# Patient Record
Sex: MALE | URBAN - METROPOLITAN AREA
[De-identification: names, ages, dates, MRNs, and addresses within clinical notes are randomized per-mention and may not be internally consistent; named-entity substitution may affect disease eponyms.]

---

## 2020-01-01 ENCOUNTER — CONFERENCE (OUTPATIENT)
Dept: TRANSPLANT | Facility: CLINIC | Age: 70
End: 2020-01-01

## 2020-01-01 ENCOUNTER — DOCUMENTATION ONLY (OUTPATIENT)
Dept: PHARMACY | Facility: HOSPITAL | Age: 70
End: 2020-01-01

## 2020-01-01 ENCOUNTER — HOSPITAL ENCOUNTER (INPATIENT)
Facility: HOSPITAL | Age: 70
LOS: 4 days | DRG: 871 | End: 2020-03-02
Attending: HOSPITALIST | Admitting: HOSPITALIST
Payer: MEDICARE

## 2020-01-01 ENCOUNTER — TELEPHONE (OUTPATIENT)
Dept: TRANSPLANT | Facility: CLINIC | Age: 70
End: 2020-01-01

## 2020-01-01 VITALS
DIASTOLIC BLOOD PRESSURE: 14 MMHG | WEIGHT: 201.81 LBS | OXYGEN SATURATION: 70 % | HEART RATE: 35 BPM | BODY MASS INDEX: 29.89 KG/M2 | RESPIRATION RATE: 7 BRPM | TEMPERATURE: 100 F | HEIGHT: 69 IN | SYSTOLIC BLOOD PRESSURE: 39 MMHG

## 2020-01-01 DIAGNOSIS — J96.01 ACUTE RESPIRATORY FAILURE WITH HYPOXIA: ICD-10-CM

## 2020-01-01 DIAGNOSIS — K74.60 CIRRHOSIS: ICD-10-CM

## 2020-01-01 DIAGNOSIS — K74.69 DECOMPENSATED HCV CIRRHOSIS: ICD-10-CM

## 2020-01-01 DIAGNOSIS — R00.1 BRADYCARDIA: ICD-10-CM

## 2020-01-01 DIAGNOSIS — B19.20 DECOMPENSATED HCV CIRRHOSIS: ICD-10-CM

## 2020-01-01 DIAGNOSIS — N17.9 ACUTE RENAL FAILURE: ICD-10-CM

## 2020-01-01 LAB
ABO + RH BLD: NORMAL
ADENOVIRUS: NOT DETECTED
AFP SERPL-MCNC: 0.8 NG/ML (ref 0–8.4)
ALBUMIN SERPL BCP-MCNC: 2.8 G/DL (ref 3.5–5.2)
ALBUMIN SERPL BCP-MCNC: 3.1 G/DL (ref 3.5–5.2)
ALBUMIN SERPL BCP-MCNC: 3.1 G/DL (ref 3.5–5.2)
ALBUMIN SERPL BCP-MCNC: 3.2 G/DL (ref 3.5–5.2)
ALBUMIN SERPL BCP-MCNC: 3.3 G/DL (ref 3.5–5.2)
ALBUMIN SERPL BCP-MCNC: 3.3 G/DL (ref 3.5–5.2)
ALBUMIN SERPL BCP-MCNC: 3.5 G/DL (ref 3.5–5.2)
ALBUMIN SERPL BCP-MCNC: 3.8 G/DL (ref 3.5–5.2)
ALBUMIN SERPL BCP-MCNC: 3.8 G/DL (ref 3.5–5.2)
ALBUMIN SERPL BCP-MCNC: 4 G/DL (ref 3.5–5.2)
ALBUMIN SERPL BCP-MCNC: 4.1 G/DL (ref 3.5–5.2)
ALBUMIN SERPL BCP-MCNC: 4.1 G/DL (ref 3.5–5.2)
ALLENS TEST: ABNORMAL
ALP SERPL-CCNC: 202 U/L (ref 55–135)
ALP SERPL-CCNC: 205 U/L (ref 55–135)
ALP SERPL-CCNC: 231 U/L (ref 55–135)
ALP SERPL-CCNC: 242 U/L (ref 55–135)
ALP SERPL-CCNC: 266 U/L (ref 55–135)
ALP SERPL-CCNC: 276 U/L (ref 55–135)
ALT SERPL W/O P-5'-P-CCNC: 114 U/L (ref 10–44)
ALT SERPL W/O P-5'-P-CCNC: 227 U/L (ref 10–44)
ALT SERPL W/O P-5'-P-CCNC: 26 U/L (ref 10–44)
ALT SERPL W/O P-5'-P-CCNC: 269 U/L (ref 10–44)
ALT SERPL W/O P-5'-P-CCNC: 28 U/L (ref 10–44)
ALT SERPL W/O P-5'-P-CCNC: 329 U/L (ref 10–44)
AMMONIA PLAS-SCNC: 38 UMOL/L (ref 10–50)
AMMONIA PLAS-SCNC: 57 UMOL/L (ref 10–50)
ANION GAP SERPL CALC-SCNC: 14 MMOL/L (ref 8–16)
ANION GAP SERPL CALC-SCNC: 14 MMOL/L (ref 8–16)
ANION GAP SERPL CALC-SCNC: 15 MMOL/L (ref 8–16)
ANION GAP SERPL CALC-SCNC: 16 MMOL/L (ref 8–16)
ANION GAP SERPL CALC-SCNC: 16 MMOL/L (ref 8–16)
ANION GAP SERPL CALC-SCNC: 17 MMOL/L (ref 8–16)
ANION GAP SERPL CALC-SCNC: 18 MMOL/L (ref 8–16)
ANISOCYTOSIS BLD QL SMEAR: ABNORMAL
ANISOCYTOSIS BLD QL SMEAR: ABNORMAL
ANISOCYTOSIS BLD QL SMEAR: SLIGHT
ANISOCYTOSIS BLD QL SMEAR: SLIGHT
ASCENDING AORTA: 3.13 CM
AST SERPL-CCNC: 417 U/L (ref 10–40)
AST SERPL-CCNC: 66 U/L (ref 10–40)
AST SERPL-CCNC: 73 U/L (ref 10–40)
AST SERPL-CCNC: 774 U/L (ref 10–40)
AST SERPL-CCNC: 786 U/L (ref 10–40)
AST SERPL-CCNC: 970 U/L (ref 10–40)
AV INDEX (PROSTH): 0.93
AV MEAN GRADIENT: 8 MMHG
AV PEAK GRADIENT: 17 MMHG
AV VALVE AREA: 3.27 CM2
AV VELOCITY RATIO: 0.84
BASO STIPL BLD QL SMEAR: ABNORMAL
BASOPHILS # BLD AUTO: 0.01 K/UL (ref 0–0.2)
BASOPHILS # BLD AUTO: 0.02 K/UL (ref 0–0.2)
BASOPHILS NFR BLD: 0.1 % (ref 0–1.9)
BILIRUB SERPL-MCNC: 13.5 MG/DL (ref 0.1–1)
BILIRUB SERPL-MCNC: 14.2 MG/DL (ref 0.1–1)
BILIRUB SERPL-MCNC: 17.9 MG/DL (ref 0.1–1)
BILIRUB SERPL-MCNC: 18.4 MG/DL (ref 0.1–1)
BILIRUB SERPL-MCNC: 20.4 MG/DL (ref 0.1–1)
BILIRUB SERPL-MCNC: 22.9 MG/DL (ref 0.1–1)
BLD GP AB SCN CELLS X3 SERPL QL: NORMAL
BNP SERPL-MCNC: 190 PG/ML (ref 0–99)
BORDETELLA PARAPERTUSSIS (IS1001): NOT DETECTED
BORDETELLA PERTUSSIS (PTXP): NOT DETECTED
BSA FOR ECHO PROCEDURE: 2.11 M2
BUN SERPL-MCNC: 162 MG/DL (ref 8–23)
BUN SERPL-MCNC: 17 MG/DL (ref 8–23)
BUN SERPL-MCNC: 172 MG/DL (ref 8–23)
BUN SERPL-MCNC: 173 MG/DL (ref 8–23)
BUN SERPL-MCNC: 21 MG/DL (ref 8–23)
BUN SERPL-MCNC: 21 MG/DL (ref 8–23)
BUN SERPL-MCNC: 4 MG/DL (ref 8–23)
BUN SERPL-MCNC: 5 MG/DL (ref 8–23)
BUN SERPL-MCNC: 6 MG/DL (ref 8–23)
BUN SERPL-MCNC: 77 MG/DL (ref 8–23)
BUN SERPL-MCNC: 8 MG/DL (ref 8–23)
BUN SERPL-MCNC: 89 MG/DL (ref 8–23)
BUN SERPL-MCNC: 89 MG/DL (ref 8–23)
BURR CELLS BLD QL SMEAR: ABNORMAL
BURR CELLS BLD QL SMEAR: ABNORMAL
CALCIUM SERPL-MCNC: 10 MG/DL (ref 8.7–10.5)
CALCIUM SERPL-MCNC: 10.6 MG/DL (ref 8.7–10.5)
CALCIUM SERPL-MCNC: 8.4 MG/DL (ref 8.7–10.5)
CALCIUM SERPL-MCNC: 8.5 MG/DL (ref 8.7–10.5)
CALCIUM SERPL-MCNC: 8.5 MG/DL (ref 8.7–10.5)
CALCIUM SERPL-MCNC: 8.6 MG/DL (ref 8.7–10.5)
CALCIUM SERPL-MCNC: 8.7 MG/DL (ref 8.7–10.5)
CALCIUM SERPL-MCNC: 9.1 MG/DL (ref 8.7–10.5)
CALCIUM SERPL-MCNC: 9.5 MG/DL (ref 8.7–10.5)
CALCIUM SERPL-MCNC: 9.5 MG/DL (ref 8.7–10.5)
CALCIUM SERPL-MCNC: 9.8 MG/DL (ref 8.7–10.5)
CHLAMYDIA PNEUMONIAE: NOT DETECTED
CHLORIDE SERPL-SCNC: 100 MMOL/L (ref 95–110)
CHLORIDE SERPL-SCNC: 100 MMOL/L (ref 95–110)
CHLORIDE SERPL-SCNC: 101 MMOL/L (ref 95–110)
CHLORIDE SERPL-SCNC: 104 MMOL/L (ref 95–110)
CHLORIDE SERPL-SCNC: 105 MMOL/L (ref 95–110)
CHLORIDE SERPL-SCNC: 105 MMOL/L (ref 95–110)
CHLORIDE SERPL-SCNC: 106 MMOL/L (ref 95–110)
CHLORIDE SERPL-SCNC: 107 MMOL/L (ref 95–110)
CHLORIDE SERPL-SCNC: 98 MMOL/L (ref 95–110)
CHLORIDE SERPL-SCNC: 98 MMOL/L (ref 95–110)
CHLORIDE SERPL-SCNC: 99 MMOL/L (ref 95–110)
CMV IGG SERPL QL IA: REACTIVE
CO2 SERPL-SCNC: 16 MMOL/L (ref 23–29)
CO2 SERPL-SCNC: 16 MMOL/L (ref 23–29)
CO2 SERPL-SCNC: 17 MMOL/L (ref 23–29)
CO2 SERPL-SCNC: 17 MMOL/L (ref 23–29)
CO2 SERPL-SCNC: 18 MMOL/L (ref 23–29)
CO2 SERPL-SCNC: 19 MMOL/L (ref 23–29)
CO2 SERPL-SCNC: 20 MMOL/L (ref 23–29)
CO2 SERPL-SCNC: 20 MMOL/L (ref 23–29)
CO2 SERPL-SCNC: 21 MMOL/L (ref 23–29)
CO2 SERPL-SCNC: 22 MMOL/L (ref 23–29)
COMPLEXED PSA SERPL-MCNC: 0.15 NG/ML (ref 0–4)
CORONAVIRUS 229E, COMMON COLD VIRUS: NOT DETECTED
CORONAVIRUS HKU1, COMMON COLD VIRUS: NOT DETECTED
CORONAVIRUS NL63, COMMON COLD VIRUS: NOT DETECTED
CORONAVIRUS OC43, COMMON COLD VIRUS: NOT DETECTED
CREAT SERPL-MCNC: 0.5 MG/DL (ref 0.5–1.4)
CREAT SERPL-MCNC: 0.5 MG/DL (ref 0.5–1.4)
CREAT SERPL-MCNC: 0.6 MG/DL (ref 0.5–1.4)
CREAT SERPL-MCNC: 0.7 MG/DL (ref 0.5–1.4)
CREAT SERPL-MCNC: 0.8 MG/DL (ref 0.5–1.4)
CREAT SERPL-MCNC: 0.8 MG/DL (ref 0.5–1.4)
CREAT SERPL-MCNC: 1.4 MG/DL (ref 0.5–1.4)
CREAT SERPL-MCNC: 1.5 MG/DL (ref 0.5–1.4)
CREAT SERPL-MCNC: 1.5 MG/DL (ref 0.5–1.4)
CREAT SERPL-MCNC: 3.3 MG/DL (ref 0.5–1.4)
CREAT SERPL-MCNC: 3.3 MG/DL (ref 0.5–1.4)
CREAT SERPL-MCNC: 3.5 MG/DL (ref 0.5–1.4)
CREAT SERPL-MCNC: 5.4 MG/DL (ref 0.5–1.4)
CREAT SERPL-MCNC: 5.7 MG/DL (ref 0.5–1.4)
CREAT SERPL-MCNC: 5.7 MG/DL (ref 0.5–1.4)
CV ECHO LV RWT: 0.36 CM
DELSYS: ABNORMAL
DIFFERENTIAL METHOD: ABNORMAL
DOP CALC AO PEAK VEL: 2.04 M/S
DOP CALC AO VTI: 44.06 CM
DOP CALC LVOT AREA: 3.5 CM2
DOP CALC LVOT DIAMETER: 2.12 CM
DOP CALC LVOT PEAK VEL: 1.71 M/S
DOP CALC LVOT STROKE VOLUME: 144.12 CM3
DOP CALCLVOT PEAK VEL VTI: 40.85 CM
E WAVE DECELERATION TIME: 207.1 MSEC
E/A RATIO: 1.38
E/E' RATIO: 13.83 M/S
EBV VCA IGG SER QL IA: POSITIVE
ECHO LV POSTERIOR WALL: 0.8 CM (ref 0.6–1.1)
EOSINOPHIL # BLD AUTO: 0.1 K/UL (ref 0–0.5)
EOSINOPHIL # BLD AUTO: 0.4 K/UL (ref 0–0.5)
EOSINOPHIL NFR BLD: 0.3 % (ref 0–8)
EOSINOPHIL NFR BLD: 0.3 % (ref 0–8)
EOSINOPHIL NFR BLD: 0.4 % (ref 0–8)
EOSINOPHIL NFR BLD: 0.5 % (ref 0–8)
EOSINOPHIL NFR BLD: 0.8 % (ref 0–8)
EOSINOPHIL NFR BLD: 1 % (ref 0–8)
EOSINOPHIL NFR BLD: 2.7 % (ref 0–8)
ERYTHROCYTE [DISTWIDTH] IN BLOOD BY AUTOMATED COUNT: 22.8 % (ref 11.5–14.5)
ERYTHROCYTE [DISTWIDTH] IN BLOOD BY AUTOMATED COUNT: 23.1 % (ref 11.5–14.5)
ERYTHROCYTE [DISTWIDTH] IN BLOOD BY AUTOMATED COUNT: 23.7 % (ref 11.5–14.5)
ERYTHROCYTE [DISTWIDTH] IN BLOOD BY AUTOMATED COUNT: 23.8 % (ref 11.5–14.5)
ERYTHROCYTE [DISTWIDTH] IN BLOOD BY AUTOMATED COUNT: 24.9 % (ref 11.5–14.5)
ERYTHROCYTE [DISTWIDTH] IN BLOOD BY AUTOMATED COUNT: 25.7 % (ref 11.5–14.5)
ERYTHROCYTE [DISTWIDTH] IN BLOOD BY AUTOMATED COUNT: 26.7 % (ref 11.5–14.5)
ERYTHROCYTE [SEDIMENTATION RATE] IN BLOOD BY WESTERGREN METHOD: 16 MM/H
EST. GFR  (AFRICAN AMERICAN): 10.9 ML/MIN/1.73 M^2
EST. GFR  (AFRICAN AMERICAN): 10.9 ML/MIN/1.73 M^2
EST. GFR  (AFRICAN AMERICAN): 11.6 ML/MIN/1.73 M^2
EST. GFR  (AFRICAN AMERICAN): 19.6 ML/MIN/1.73 M^2
EST. GFR  (AFRICAN AMERICAN): 21 ML/MIN/1.73 M^2
EST. GFR  (AFRICAN AMERICAN): 21 ML/MIN/1.73 M^2
EST. GFR  (AFRICAN AMERICAN): 54.5 ML/MIN/1.73 M^2
EST. GFR  (AFRICAN AMERICAN): 54.5 ML/MIN/1.73 M^2
EST. GFR  (AFRICAN AMERICAN): 59.3 ML/MIN/1.73 M^2
EST. GFR  (AFRICAN AMERICAN): >60 ML/MIN/1.73 M^2
EST. GFR  (NON AFRICAN AMERICAN): 10 ML/MIN/1.73 M^2
EST. GFR  (NON AFRICAN AMERICAN): 16.9 ML/MIN/1.73 M^2
EST. GFR  (NON AFRICAN AMERICAN): 18.2 ML/MIN/1.73 M^2
EST. GFR  (NON AFRICAN AMERICAN): 18.2 ML/MIN/1.73 M^2
EST. GFR  (NON AFRICAN AMERICAN): 47.2 ML/MIN/1.73 M^2
EST. GFR  (NON AFRICAN AMERICAN): 47.2 ML/MIN/1.73 M^2
EST. GFR  (NON AFRICAN AMERICAN): 51.3 ML/MIN/1.73 M^2
EST. GFR  (NON AFRICAN AMERICAN): 9.4 ML/MIN/1.73 M^2
EST. GFR  (NON AFRICAN AMERICAN): 9.4 ML/MIN/1.73 M^2
EST. GFR  (NON AFRICAN AMERICAN): >60 ML/MIN/1.73 M^2
ESTIMATED AVG GLUCOSE: 123 MG/DL (ref 68–131)
FERRITIN SERPL-MCNC: 4865 NG/ML (ref 20–300)
FIBRINOGEN PPP-MCNC: 230 MG/DL (ref 182–366)
FIO2: 98
FLOW: 12
FLOW: 15
FLOW: 4
FLUBV RNA NPH QL NAA+NON-PROBE: NOT DETECTED
FRACTIONAL SHORTENING: 38 % (ref 28–44)
GAMMA INTERFERON BACKGROUND BLD IA-ACNC: 0.01 IU/ML
GIANT PLATELETS BLD QL SMEAR: PRESENT
GLUCOSE SERPL-MCNC: 100 MG/DL (ref 70–110)
GLUCOSE SERPL-MCNC: 107 MG/DL (ref 70–110)
GLUCOSE SERPL-MCNC: 107 MG/DL (ref 70–110)
GLUCOSE SERPL-MCNC: 109 MG/DL (ref 70–110)
GLUCOSE SERPL-MCNC: 118 MG/DL (ref 70–110)
GLUCOSE SERPL-MCNC: 121 MG/DL (ref 70–110)
GLUCOSE SERPL-MCNC: 49 MG/DL (ref 70–110)
GLUCOSE SERPL-MCNC: 49 MG/DL (ref 70–110)
GLUCOSE SERPL-MCNC: 72 MG/DL (ref 70–110)
GLUCOSE SERPL-MCNC: 76 MG/DL (ref 70–110)
GLUCOSE SERPL-MCNC: 76 MG/DL (ref 70–110)
GLUCOSE SERPL-MCNC: 82 MG/DL (ref 70–110)
GLUCOSE SERPL-MCNC: 89 MG/DL (ref 70–110)
GLUCOSE SERPL-MCNC: 94 MG/DL (ref 70–110)
GLUCOSE SERPL-MCNC: 95 MG/DL (ref 70–110)
HBA1C MFR BLD HPLC: 5.9 % (ref 4–5.6)
HBV CORE AB SERPL QL IA: NEGATIVE
HBV SURFACE AB SER-ACNC: NEGATIVE M[IU]/ML
HBV SURFACE AG SERPL QL IA: NEGATIVE
HCO3 UR-SCNC: 19.5 MMOL/L (ref 24–28)
HCO3 UR-SCNC: 20.2 MMOL/L (ref 24–28)
HCO3 UR-SCNC: 21.4 MMOL/L (ref 24–28)
HCO3 UR-SCNC: 21.4 MMOL/L (ref 24–28)
HCO3 UR-SCNC: 23.3 MMOL/L (ref 24–28)
HCT VFR BLD AUTO: 25.2 % (ref 40–54)
HCT VFR BLD AUTO: 25.4 % (ref 40–54)
HCT VFR BLD AUTO: 25.8 % (ref 40–54)
HCT VFR BLD AUTO: 26.6 % (ref 40–54)
HCT VFR BLD AUTO: 26.7 % (ref 40–54)
HCT VFR BLD AUTO: 26.9 % (ref 40–54)
HCT VFR BLD AUTO: 28.2 % (ref 40–54)
HCT VFR BLD CALC: 26 %PCV (ref 36–54)
HCV AB SERPL QL IA: NEGATIVE
HEPATITIS A ANTIBODY, IGG: POSITIVE
HGB BLD-MCNC: 7.8 G/DL (ref 14–18)
HGB BLD-MCNC: 7.8 G/DL (ref 14–18)
HGB BLD-MCNC: 8 G/DL (ref 14–18)
HGB BLD-MCNC: 8.1 G/DL (ref 14–18)
HGB BLD-MCNC: 8.8 G/DL (ref 14–18)
HIV 1+2 AB+HIV1 P24 AG SERPL QL IA: NEGATIVE
HPIV1 RNA NPH QL NAA+NON-PROBE: NOT DETECTED
HPIV2 RNA NPH QL NAA+NON-PROBE: NOT DETECTED
HPIV3 RNA NPH QL NAA+NON-PROBE: NOT DETECTED
HPIV4 RNA NPH QL NAA+NON-PROBE: NOT DETECTED
HUMAN METAPNEUMOVIRUS: NOT DETECTED
HYPOCHROMIA BLD QL SMEAR: ABNORMAL
IMM GRANULOCYTES # BLD AUTO: 0.07 K/UL (ref 0–0.04)
IMM GRANULOCYTES # BLD AUTO: 0.09 K/UL (ref 0–0.04)
IMM GRANULOCYTES # BLD AUTO: 0.11 K/UL (ref 0–0.04)
IMM GRANULOCYTES # BLD AUTO: 0.13 K/UL (ref 0–0.04)
IMM GRANULOCYTES # BLD AUTO: 0.13 K/UL (ref 0–0.04)
IMM GRANULOCYTES # BLD AUTO: 0.16 K/UL (ref 0–0.04)
IMM GRANULOCYTES # BLD AUTO: 0.21 K/UL (ref 0–0.04)
IMM GRANULOCYTES NFR BLD AUTO: 0.5 % (ref 0–0.5)
IMM GRANULOCYTES NFR BLD AUTO: 0.6 % (ref 0–0.5)
IMM GRANULOCYTES NFR BLD AUTO: 0.7 % (ref 0–0.5)
IMM GRANULOCYTES NFR BLD AUTO: 0.7 % (ref 0–0.5)
IMM GRANULOCYTES NFR BLD AUTO: 0.8 % (ref 0–0.5)
IMM GRANULOCYTES NFR BLD AUTO: 1 % (ref 0–0.5)
IMM GRANULOCYTES NFR BLD AUTO: 1.4 % (ref 0–0.5)
INFLUENZA A (SUBTYPES H1,H1-2009,H3): NOT DETECTED
INFLUENZA A, MOLECULAR: NEGATIVE
INFLUENZA B, MOLECULAR: NEGATIVE
INR PPP: 1.8 (ref 0.8–1.2)
INR PPP: 1.9 (ref 0.8–1.2)
INR PPP: 2.3 (ref 0.8–1.2)
INR PPP: 2.9 (ref 0.8–1.2)
INR PPP: 3.8 (ref 0.8–1.2)
INR PPP: 4.7 (ref 0.8–1.2)
INTERVENTRICULAR SEPTUM: 0.8 CM (ref 0.6–1.1)
IRON SERPL-MCNC: 77 UG/DL (ref 45–160)
LA MAJOR: 5.67 CM
LA MINOR: 5.6 CM
LA WIDTH: 3.65 CM
LACTATE SERPL-SCNC: 2.5 MMOL/L (ref 0.5–2.2)
LACTATE SERPL-SCNC: 3.5 MMOL/L (ref 0.5–2.2)
LACTATE SERPL-SCNC: 3.6 MMOL/L (ref 0.5–2.2)
LACTATE SERPL-SCNC: 3.6 MMOL/L (ref 0.5–2.2)
LACTATE SERPL-SCNC: 4.7 MMOL/L (ref 0.5–2.2)
LACTATE SERPL-SCNC: 5.3 MMOL/L (ref 0.5–2.2)
LACTATE SERPL-SCNC: 6.2 MMOL/L (ref 0.5–2.2)
LACTATE SERPL-SCNC: 8.2 MMOL/L (ref 0.5–2.2)
LACTATE SERPL-SCNC: 8.5 MMOL/L (ref 0.5–2.2)
LDH SERPL L TO P-CCNC: 727 U/L (ref 110–260)
LDH SERPL L TO P-CCNC: 801 U/L (ref 110–260)
LDH SERPL L TO P-CCNC: 986 U/L (ref 110–260)
LEFT ATRIUM SIZE: 4.1 CM
LEFT ATRIUM VOLUME INDEX: 34.6 ML/M2
LEFT ATRIUM VOLUME: 71.68 CM3
LEFT INTERNAL DIMENSION IN SYSTOLE: 2.77 CM (ref 2.1–4)
LEFT VENTRICLE DIASTOLIC VOLUME INDEX: 38.03 ML/M2
LEFT VENTRICLE DIASTOLIC VOLUME: 78.88 ML
LEFT VENTRICLE MASS INDEX: 55 G/M2
LEFT VENTRICLE SYSTOLIC VOLUME INDEX: 13.8 ML/M2
LEFT VENTRICLE SYSTOLIC VOLUME: 28.69 ML
LEFT VENTRICULAR INTERNAL DIMENSION IN DIASTOLE: 4.5 CM (ref 3.5–6)
LEFT VENTRICULAR MASS: 113.63 G
LV LATERAL E/E' RATIO: 13.83 M/S
LV SEPTAL E/E' RATIO: 13.83 M/S
LYMPHOCYTES # BLD AUTO: 1 K/UL (ref 1–4.8)
LYMPHOCYTES # BLD AUTO: 1.1 K/UL (ref 1–4.8)
LYMPHOCYTES # BLD AUTO: 1.2 K/UL (ref 1–4.8)
LYMPHOCYTES # BLD AUTO: 1.3 K/UL (ref 1–4.8)
LYMPHOCYTES # BLD AUTO: 1.4 K/UL (ref 1–4.8)
LYMPHOCYTES # BLD AUTO: 1.6 K/UL (ref 1–4.8)
LYMPHOCYTES # BLD AUTO: 2.3 K/UL (ref 1–4.8)
LYMPHOCYTES NFR BLD: 10.2 % (ref 18–48)
LYMPHOCYTES NFR BLD: 15.3 % (ref 18–48)
LYMPHOCYTES NFR BLD: 6.3 % (ref 18–48)
LYMPHOCYTES NFR BLD: 6.8 % (ref 18–48)
LYMPHOCYTES NFR BLD: 8 % (ref 18–48)
LYMPHOCYTES NFR BLD: 9.2 % (ref 18–48)
LYMPHOCYTES NFR BLD: 9.3 % (ref 18–48)
M TB IFN-G CD4+ BCKGRND COR BLD-ACNC: 0 IU/ML
MAGNESIUM SERPL-MCNC: 1.9 MG/DL (ref 1.6–2.6)
MAGNESIUM SERPL-MCNC: 1.9 MG/DL (ref 1.6–2.6)
MAGNESIUM SERPL-MCNC: 2 MG/DL (ref 1.6–2.6)
MAGNESIUM SERPL-MCNC: 2.1 MG/DL (ref 1.6–2.6)
MAGNESIUM SERPL-MCNC: 2.3 MG/DL (ref 1.6–2.6)
MAGNESIUM SERPL-MCNC: 2.6 MG/DL (ref 1.6–2.6)
MCH RBC QN AUTO: 31.9 PG (ref 27–31)
MCH RBC QN AUTO: 32.2 PG (ref 27–31)
MCH RBC QN AUTO: 32.3 PG (ref 27–31)
MCH RBC QN AUTO: 32.4 PG (ref 27–31)
MCH RBC QN AUTO: 32.6 PG (ref 27–31)
MCH RBC QN AUTO: 32.7 PG (ref 27–31)
MCH RBC QN AUTO: 33.1 PG (ref 27–31)
MCHC RBC AUTO-ENTMCNC: 29.2 G/DL (ref 32–36)
MCHC RBC AUTO-ENTMCNC: 30.1 G/DL (ref 32–36)
MCHC RBC AUTO-ENTMCNC: 30.1 G/DL (ref 32–36)
MCHC RBC AUTO-ENTMCNC: 30.2 G/DL (ref 32–36)
MCHC RBC AUTO-ENTMCNC: 31.2 G/DL (ref 32–36)
MCHC RBC AUTO-ENTMCNC: 31.5 G/DL (ref 32–36)
MCHC RBC AUTO-ENTMCNC: 31.7 G/DL (ref 32–36)
MCV RBC AUTO: 102 FL (ref 82–98)
MCV RBC AUTO: 104 FL (ref 82–98)
MCV RBC AUTO: 104 FL (ref 82–98)
MCV RBC AUTO: 106 FL (ref 82–98)
MCV RBC AUTO: 107 FL (ref 82–98)
MCV RBC AUTO: 110 FL (ref 82–98)
MCV RBC AUTO: 110 FL (ref 82–98)
MITOGEN IGNF BCKGRD COR BLD-ACNC: >10 IU/ML
MODE: ABNORMAL
MONOCYTES # BLD AUTO: 1 K/UL (ref 0.3–1)
MONOCYTES # BLD AUTO: 1.1 K/UL (ref 0.3–1)
MONOCYTES # BLD AUTO: 1.1 K/UL (ref 0.3–1)
MONOCYTES # BLD AUTO: 1.5 K/UL (ref 0.3–1)
MONOCYTES # BLD AUTO: 1.7 K/UL (ref 0.3–1)
MONOCYTES # BLD AUTO: 2 K/UL (ref 0.3–1)
MONOCYTES # BLD AUTO: 2 K/UL (ref 0.3–1)
MONOCYTES NFR BLD: 12.8 % (ref 4–15)
MONOCYTES NFR BLD: 13.4 % (ref 4–15)
MONOCYTES NFR BLD: 6.7 % (ref 4–15)
MONOCYTES NFR BLD: 8 % (ref 4–15)
MONOCYTES NFR BLD: 8 % (ref 4–15)
MONOCYTES NFR BLD: 8.8 % (ref 4–15)
MONOCYTES NFR BLD: 9.5 % (ref 4–15)
MV PEAK A VEL: 0.6 M/S
MV PEAK E VEL: 0.83 M/S
MYCOPLASMA PNEUMONIAE: NOT DETECTED
NEUTROPHILS # BLD AUTO: 10.1 K/UL (ref 1.8–7.7)
NEUTROPHILS # BLD AUTO: 10.3 K/UL (ref 1.8–7.7)
NEUTROPHILS # BLD AUTO: 11.6 K/UL (ref 1.8–7.7)
NEUTROPHILS # BLD AUTO: 11.7 K/UL (ref 1.8–7.7)
NEUTROPHILS # BLD AUTO: 13.6 K/UL (ref 1.8–7.7)
NEUTROPHILS # BLD AUTO: 14.2 K/UL (ref 1.8–7.7)
NEUTROPHILS # BLD AUTO: 15 K/UL (ref 1.8–7.7)
NEUTROPHILS NFR BLD: 67.1 % (ref 38–73)
NEUTROPHILS NFR BLD: 76 % (ref 38–73)
NEUTROPHILS NFR BLD: 80.2 % (ref 38–73)
NEUTROPHILS NFR BLD: 80.8 % (ref 38–73)
NEUTROPHILS NFR BLD: 82.6 % (ref 38–73)
NEUTROPHILS NFR BLD: 82.8 % (ref 38–73)
NEUTROPHILS NFR BLD: 85.8 % (ref 38–73)
NRBC BLD-RTO: 0 /100 WBC
NRBC BLD-RTO: 1 /100 WBC
NRBC BLD-RTO: 2 /100 WBC
OVALOCYTES BLD QL SMEAR: ABNORMAL
PCO2 BLDA: 28.3 MMHG (ref 35–45)
PCO2 BLDA: 31.2 MMHG (ref 35–45)
PCO2 BLDA: 40.2 MMHG (ref 35–45)
PCO2 BLDA: 42.3 MMHG (ref 35–45)
PCO2 BLDA: 54.8 MMHG (ref 35–45)
PH SMN: 7.24 [PH] (ref 7.35–7.45)
PH SMN: 7.31 [PH] (ref 7.35–7.45)
PH SMN: 7.33 [PH] (ref 7.35–7.45)
PH SMN: 7.42 [PH] (ref 7.35–7.45)
PH SMN: 7.45 [PH] (ref 7.35–7.45)
PHOSPHATE SERPL-MCNC: 1.8 MG/DL (ref 2.7–4.5)
PHOSPHATE SERPL-MCNC: 1.9 MG/DL (ref 2.7–4.5)
PHOSPHATE SERPL-MCNC: 1.9 MG/DL (ref 2.7–4.5)
PHOSPHATE SERPL-MCNC: 2 MG/DL (ref 2.7–4.5)
PHOSPHATE SERPL-MCNC: 2.2 MG/DL (ref 2.7–4.5)
PHOSPHATE SERPL-MCNC: 2.5 MG/DL (ref 2.7–4.5)
PHOSPHATE SERPL-MCNC: 3.1 MG/DL (ref 2.7–4.5)
PHOSPHATE SERPL-MCNC: 4.2 MG/DL (ref 2.7–4.5)
PHOSPHATE SERPL-MCNC: 4.2 MG/DL (ref 2.7–4.5)
PHOSPHATE SERPL-MCNC: 4.3 MG/DL (ref 2.7–4.5)
PHOSPHATE SERPL-MCNC: 4.3 MG/DL (ref 2.7–4.5)
PHOSPHATE SERPL-MCNC: 5 MG/DL (ref 2.7–4.5)
PHOSPHATE SERPL-MCNC: 8 MG/DL (ref 2.7–4.5)
PHOSPHATIDYLETHANOL (PETH): NEGATIVE NG/ML
PISA TR MAX VEL: 2.45 M/S
PLATELET # BLD AUTO: 30 K/UL (ref 150–350)
PLATELET # BLD AUTO: 31 K/UL (ref 150–350)
PLATELET # BLD AUTO: 39 K/UL (ref 150–350)
PLATELET # BLD AUTO: 68 K/UL (ref 150–350)
PLATELET # BLD AUTO: 71 K/UL (ref 150–350)
PLATELET # BLD AUTO: 73 K/UL (ref 150–350)
PLATELET # BLD AUTO: 74 K/UL (ref 150–350)
PLATELET BLD QL SMEAR: ABNORMAL
PMV BLD AUTO: 11.8 FL (ref 9.2–12.9)
PMV BLD AUTO: 11.9 FL (ref 9.2–12.9)
PMV BLD AUTO: 12.3 FL (ref 9.2–12.9)
PMV BLD AUTO: 12.5 FL (ref 9.2–12.9)
PMV BLD AUTO: 12.6 FL (ref 9.2–12.9)
PMV BLD AUTO: 12.6 FL (ref 9.2–12.9)
PMV BLD AUTO: 13.8 FL (ref 9.2–12.9)
PO2 BLDA: 34 MMHG (ref 40–60)
PO2 BLDA: 47 MMHG (ref 40–60)
PO2 BLDA: 59 MMHG (ref 80–100)
PO2 BLDA: 64 MMHG (ref 80–100)
PO2 BLDA: 66 MMHG (ref 80–100)
POC BE: -4 MMOL/L
POC BE: -5 MMOL/L
POC BE: -5 MMOL/L
POC IONIZED CALCIUM: 1.26 MMOL/L (ref 1.06–1.42)
POC SATURATED O2: 69 % (ref 95–100)
POC SATURATED O2: 74 % (ref 95–100)
POC SATURATED O2: 88 % (ref 95–100)
POC SATURATED O2: 91 % (ref 95–100)
POC SATURATED O2: 93 % (ref 95–100)
POC TCO2: 20 MMOL/L (ref 24–29)
POC TCO2: 21 MMOL/L (ref 23–27)
POC TCO2: 23 MMOL/L (ref 23–27)
POC TCO2: 23 MMOL/L (ref 23–27)
POC TCO2: 25 MMOL/L (ref 24–29)
POCT GLUCOSE: 104 MG/DL (ref 70–110)
POCT GLUCOSE: 106 MG/DL (ref 70–110)
POCT GLUCOSE: 107 MG/DL (ref 70–110)
POCT GLUCOSE: 108 MG/DL (ref 70–110)
POCT GLUCOSE: 114 MG/DL (ref 70–110)
POCT GLUCOSE: 115 MG/DL (ref 70–110)
POCT GLUCOSE: 117 MG/DL (ref 70–110)
POCT GLUCOSE: 131 MG/DL (ref 70–110)
POCT GLUCOSE: 149 MG/DL (ref 70–110)
POCT GLUCOSE: 41 MG/DL (ref 70–110)
POCT GLUCOSE: 52 MG/DL (ref 70–110)
POCT GLUCOSE: 89 MG/DL (ref 70–110)
POCT GLUCOSE: 91 MG/DL (ref 70–110)
POCT GLUCOSE: 95 MG/DL (ref 70–110)
POCT GLUCOSE: 96 MG/DL (ref 70–110)
POIKILOCYTOSIS BLD QL SMEAR: SLIGHT
POLYCHROMASIA BLD QL SMEAR: ABNORMAL
POTASSIUM BLD-SCNC: 4.6 MMOL/L (ref 3.5–5.1)
POTASSIUM SERPL-SCNC: 4.4 MMOL/L (ref 3.5–5.1)
POTASSIUM SERPL-SCNC: 4.4 MMOL/L (ref 3.5–5.1)
POTASSIUM SERPL-SCNC: 4.5 MMOL/L (ref 3.5–5.1)
POTASSIUM SERPL-SCNC: 4.5 MMOL/L (ref 3.5–5.1)
POTASSIUM SERPL-SCNC: 4.6 MMOL/L (ref 3.5–5.1)
POTASSIUM SERPL-SCNC: 4.7 MMOL/L (ref 3.5–5.1)
POTASSIUM SERPL-SCNC: 4.8 MMOL/L (ref 3.5–5.1)
POTASSIUM SERPL-SCNC: 4.8 MMOL/L (ref 3.5–5.1)
POTASSIUM SERPL-SCNC: 4.9 MMOL/L (ref 3.5–5.1)
POTASSIUM SERPL-SCNC: 5 MMOL/L (ref 3.5–5.1)
PREALB SERPL-MCNC: 7 MG/DL (ref 20–43)
PROT SERPL-MCNC: 5.3 G/DL (ref 6–8.4)
PROT SERPL-MCNC: 5.5 G/DL (ref 6–8.4)
PROT SERPL-MCNC: 5.8 G/DL (ref 6–8.4)
PROT SERPL-MCNC: 6.2 G/DL (ref 6–8.4)
PROT SERPL-MCNC: 6.2 G/DL (ref 6–8.4)
PROT SERPL-MCNC: 6.8 G/DL (ref 6–8.4)
PROTHROMBIN TIME: 17 SEC (ref 9–12.5)
PROTHROMBIN TIME: 18 SEC (ref 9–12.5)
PROTHROMBIN TIME: 21.9 SEC (ref 9–12.5)
PROTHROMBIN TIME: 27.6 SEC (ref 9–12.5)
PROTHROMBIN TIME: 35.5 SEC (ref 9–12.5)
PROTHROMBIN TIME: 45.1 SEC (ref 9–12.5)
PROVIDER CREDENTIALS: ABNORMAL
PROVIDER NOTIFIED: ABNORMAL
PULM VEIN S/D RATIO: 1.27
PV PEAK D VEL: 0.63 M/S
PV PEAK S VEL: 0.8 M/S
RA MAJOR: 5.85 CM
RA WIDTH: 3.71 CM
RBC # BLD AUTO: 2.41 M/UL (ref 4.6–6.2)
RBC # BLD AUTO: 2.42 M/UL (ref 4.6–6.2)
RBC # BLD AUTO: 2.45 M/UL (ref 4.6–6.2)
RBC # BLD AUTO: 2.45 M/UL (ref 4.6–6.2)
RBC # BLD AUTO: 2.48 M/UL (ref 4.6–6.2)
RBC # BLD AUTO: 2.51 M/UL (ref 4.6–6.2)
RBC # BLD AUTO: 2.7 M/UL (ref 4.6–6.2)
RESPIRATORY INFECTION PANEL SOURCE: NORMAL
RIGHT VENTRICULAR END-DIASTOLIC DIMENSION: 4.04 CM
RPR SER QL: NORMAL
RSV RNA NPH QL NAA+NON-PROBE: NOT DETECTED
RV+EV RNA NPH QL NAA+NON-PROBE: NOT DETECTED
SAMPLE: ABNORMAL
SATURATED IRON: 52 % (ref 20–50)
SCHISTOCYTES BLD QL SMEAR: ABNORMAL
SINUS: 3.57 CM
SITE: ABNORMAL
SODIUM BLD-SCNC: 135 MMOL/L (ref 136–145)
SODIUM SERPL-SCNC: 135 MMOL/L (ref 136–145)
SODIUM SERPL-SCNC: 135 MMOL/L (ref 136–145)
SODIUM SERPL-SCNC: 136 MMOL/L (ref 136–145)
SODIUM SERPL-SCNC: 137 MMOL/L (ref 136–145)
SODIUM SERPL-SCNC: 138 MMOL/L (ref 136–145)
SODIUM SERPL-SCNC: 139 MMOL/L (ref 136–145)
SODIUM SERPL-SCNC: 140 MMOL/L (ref 136–145)
SP02: 95
SP02: 96
SPECIMEN SOURCE: NORMAL
STJ: 2.75 CM
STRONGYLOIDES ANTIBODY IGG: NEGATIVE
T4 FREE SERPL-MCNC: 0.95 NG/DL (ref 0.71–1.51)
TARGETS BLD QL SMEAR: ABNORMAL
TARGETS BLD QL SMEAR: ABNORMAL
TB GOLD PLUS: NEGATIVE
TB2 - NIL: 0 IU/ML
TDI LATERAL: 0.06 M/S
TDI SEPTAL: 0.06 M/S
TDI: 0.06 M/S
TIME NOTIFIED: 905
TOTAL IRON BINDING CAPACITY: 148 UG/DL (ref 250–450)
TOXIC GRANULES BLD QL SMEAR: PRESENT
TR MAX PG: 24 MMHG
TRANSFERRIN SERPL-MCNC: 100 MG/DL (ref 200–375)
TRICUSPID ANNULAR PLANE SYSTOLIC EXCURSION: 1.12 CM
TROPONIN I SERPL DL<=0.01 NG/ML-MCNC: 0.25 NG/ML (ref 0–0.03)
TROPONIN I SERPL DL<=0.01 NG/ML-MCNC: 0.27 NG/ML (ref 0–0.03)
TROPONIN I SERPL DL<=0.01 NG/ML-MCNC: 0.31 NG/ML (ref 0–0.03)
TSH SERPL DL<=0.005 MIU/L-ACNC: 0.03 UIU/ML (ref 0.4–4)
VANCOMYCIN SERPL-MCNC: 12.5 UG/ML
VANCOMYCIN SERPL-MCNC: 15.7 UG/ML
VANCOMYCIN SERPL-MCNC: 19.2 UG/ML
VARICELLA INTERPRETATION: POSITIVE
VARICELLA ZOSTER IGG: 3.43 ISR (ref 0–0.9)
VERBAL RESULT READBACK PERFORMED: YES
WBC # BLD AUTO: 12.8 K/UL (ref 3.9–12.7)
WBC # BLD AUTO: 13.99 K/UL (ref 3.9–12.7)
WBC # BLD AUTO: 15.13 K/UL (ref 3.9–12.7)
WBC # BLD AUTO: 15.43 K/UL (ref 3.9–12.7)
WBC # BLD AUTO: 16.5 K/UL (ref 3.9–12.7)
WBC # BLD AUTO: 16.86 K/UL (ref 3.9–12.7)
WBC # BLD AUTO: 18.2 K/UL (ref 3.9–12.7)

## 2020-01-01 PROCEDURE — 99900035 HC TECH TIME PER 15 MIN (STAT)

## 2020-01-01 PROCEDURE — 85610 PROTHROMBIN TIME: CPT

## 2020-01-01 PROCEDURE — 63600175 PHARM REV CODE 636 W HCPCS: Performed by: STUDENT IN AN ORGANIZED HEALTH CARE EDUCATION/TRAINING PROGRAM

## 2020-01-01 PROCEDURE — 99291 PR CRITICAL CARE, E/M 30-74 MINUTES: ICD-10-PCS | Mod: GC,,, | Performed by: INTERNAL MEDICINE

## 2020-01-01 PROCEDURE — 99223 1ST HOSP IP/OBS HIGH 75: CPT | Mod: GC,,, | Performed by: INTERNAL MEDICINE

## 2020-01-01 PROCEDURE — 87040 BLOOD CULTURE FOR BACTERIA: CPT | Mod: 59

## 2020-01-01 PROCEDURE — 25000003 PHARM REV CODE 250: Performed by: STUDENT IN AN ORGANIZED HEALTH CARE EDUCATION/TRAINING PROGRAM

## 2020-01-01 PROCEDURE — 27000221 HC OXYGEN, UP TO 24 HOURS

## 2020-01-01 PROCEDURE — 83036 HEMOGLOBIN GLYCOSYLATED A1C: CPT

## 2020-01-01 PROCEDURE — 25000242 PHARM REV CODE 250 ALT 637 W/ HCPCS: Performed by: HOSPITALIST

## 2020-01-01 PROCEDURE — 82140 ASSAY OF AMMONIA: CPT

## 2020-01-01 PROCEDURE — 84484 ASSAY OF TROPONIN QUANT: CPT | Mod: 91

## 2020-01-01 PROCEDURE — 25000003 PHARM REV CODE 250: Performed by: HOSPITALIST

## 2020-01-01 PROCEDURE — C1751 CATH, INF, PER/CENT/MIDLINE: HCPCS

## 2020-01-01 PROCEDURE — 83615 LACTATE (LD) (LDH) ENZYME: CPT

## 2020-01-01 PROCEDURE — 51701 INSERT BLADDER CATHETER: CPT

## 2020-01-01 PROCEDURE — C9113 INJ PANTOPRAZOLE SODIUM, VIA: HCPCS | Performed by: STUDENT IN AN ORGANIZED HEALTH CARE EDUCATION/TRAINING PROGRAM

## 2020-01-01 PROCEDURE — 99232 PR SUBSEQUENT HOSPITAL CARE,LEVL II: ICD-10-PCS | Mod: GC,,, | Performed by: INTERNAL MEDICINE

## 2020-01-01 PROCEDURE — 86682 HELMINTH ANTIBODY: CPT

## 2020-01-01 PROCEDURE — 83735 ASSAY OF MAGNESIUM: CPT | Mod: 91

## 2020-01-01 PROCEDURE — 80202 ASSAY OF VANCOMYCIN: CPT

## 2020-01-01 PROCEDURE — 51702 INSERT TEMP BLADDER CATH: CPT

## 2020-01-01 PROCEDURE — 85384 FIBRINOGEN ACTIVITY: CPT

## 2020-01-01 PROCEDURE — 82803 BLOOD GASES ANY COMBINATION: CPT

## 2020-01-01 PROCEDURE — 25000003 PHARM REV CODE 250: Performed by: GENERAL PRACTICE

## 2020-01-01 PROCEDURE — 25000003 PHARM REV CODE 250

## 2020-01-01 PROCEDURE — 25000242 PHARM REV CODE 250 ALT 637 W/ HCPCS: Performed by: STUDENT IN AN ORGANIZED HEALTH CARE EDUCATION/TRAINING PROGRAM

## 2020-01-01 PROCEDURE — 27100171 HC OXYGEN HIGH FLOW UP TO 24 HOURS

## 2020-01-01 PROCEDURE — 94761 N-INVAS EAR/PLS OXIMETRY MLT: CPT

## 2020-01-01 PROCEDURE — 94640 AIRWAY INHALATION TREATMENT: CPT

## 2020-01-01 PROCEDURE — 85025 COMPLETE CBC W/AUTO DIFF WBC: CPT

## 2020-01-01 PROCEDURE — 95720 EEG PHY/QHP EA INCR W/VEEG: CPT | Mod: ,,, | Performed by: PSYCHIATRY & NEUROLOGY

## 2020-01-01 PROCEDURE — 95714 VEEG EA 12-26 HR UNMNTR: CPT

## 2020-01-01 PROCEDURE — 86665 EPSTEIN-BARR CAPSID VCA: CPT

## 2020-01-01 PROCEDURE — 99223 PR INITIAL HOSPITAL CARE,LEVL III: ICD-10-PCS | Mod: GC,,, | Performed by: INTERNAL MEDICINE

## 2020-01-01 PROCEDURE — 99291 PR CRITICAL CARE, E/M 30-74 MINUTES: ICD-10-PCS | Mod: ,,, | Performed by: INTERNAL MEDICINE

## 2020-01-01 PROCEDURE — 63600175 PHARM REV CODE 636 W HCPCS: Performed by: INTERNAL MEDICINE

## 2020-01-01 PROCEDURE — 83605 ASSAY OF LACTIC ACID: CPT | Mod: 91

## 2020-01-01 PROCEDURE — 97163 PT EVAL HIGH COMPLEX 45 MIN: CPT

## 2020-01-01 PROCEDURE — 86704 HEP B CORE ANTIBODY TOTAL: CPT

## 2020-01-01 PROCEDURE — 80321 ALCOHOLS BIOMARKERS 1OR 2: CPT

## 2020-01-01 PROCEDURE — 97165 OT EVAL LOW COMPLEX 30 MIN: CPT

## 2020-01-01 PROCEDURE — 36556 INSERT NON-TUNNEL CV CATH: CPT

## 2020-01-01 PROCEDURE — 84439 ASSAY OF FREE THYROXINE: CPT

## 2020-01-01 PROCEDURE — 80069 RENAL FUNCTION PANEL: CPT

## 2020-01-01 PROCEDURE — 83605 ASSAY OF LACTIC ACID: CPT

## 2020-01-01 PROCEDURE — 99233 PR SUBSEQUENT HOSPITAL CARE,LEVL III: ICD-10-PCS | Mod: GC,,, | Performed by: INTERNAL MEDICINE

## 2020-01-01 PROCEDURE — 90945 DIALYSIS ONE EVALUATION: CPT

## 2020-01-01 PROCEDURE — 86787 VARICELLA-ZOSTER ANTIBODY: CPT

## 2020-01-01 PROCEDURE — 20000000 HC ICU ROOM

## 2020-01-01 PROCEDURE — 80053 COMPREHEN METABOLIC PANEL: CPT

## 2020-01-01 PROCEDURE — 93010 EKG 12-LEAD: ICD-10-PCS | Mod: ,,, | Performed by: INTERNAL MEDICINE

## 2020-01-01 PROCEDURE — 86706 HEP B SURFACE ANTIBODY: CPT

## 2020-01-01 PROCEDURE — 85025 COMPLETE CBC W/AUTO DIFF WBC: CPT | Mod: 91

## 2020-01-01 PROCEDURE — 84100 ASSAY OF PHOSPHORUS: CPT

## 2020-01-01 PROCEDURE — 99291 CRITICAL CARE FIRST HOUR: CPT | Mod: GC,,, | Performed by: INTERNAL MEDICINE

## 2020-01-01 PROCEDURE — 95700 EEG CONT REC W/VID EEG TECH: CPT

## 2020-01-01 PROCEDURE — 84443 ASSAY THYROID STIM HORMONE: CPT

## 2020-01-01 PROCEDURE — 27100092 HC HIGH FLOW DELIVERY CANNULA

## 2020-01-01 PROCEDURE — 87798 DETECT AGENT NOS DNA AMP: CPT

## 2020-01-01 PROCEDURE — 94660 CPAP INITIATION&MGMT: CPT

## 2020-01-01 PROCEDURE — 82105 ALPHA-FETOPROTEIN SERUM: CPT

## 2020-01-01 PROCEDURE — 36600 WITHDRAWAL OF ARTERIAL BLOOD: CPT

## 2020-01-01 PROCEDURE — 83735 ASSAY OF MAGNESIUM: CPT

## 2020-01-01 PROCEDURE — 84484 ASSAY OF TROPONIN QUANT: CPT

## 2020-01-01 PROCEDURE — 85014 HEMATOCRIT: CPT

## 2020-01-01 PROCEDURE — 99232 SBSQ HOSP IP/OBS MODERATE 35: CPT | Mod: GC,,, | Performed by: INTERNAL MEDICINE

## 2020-01-01 PROCEDURE — 36415 COLL VENOUS BLD VENIPUNCTURE: CPT

## 2020-01-01 PROCEDURE — 84132 ASSAY OF SERUM POTASSIUM: CPT

## 2020-01-01 PROCEDURE — 93010 ELECTROCARDIOGRAM REPORT: CPT | Mod: ,,, | Performed by: INTERNAL MEDICINE

## 2020-01-01 PROCEDURE — 99291 CRITICAL CARE FIRST HOUR: CPT | Mod: ,,, | Performed by: INTERNAL MEDICINE

## 2020-01-01 PROCEDURE — 86803 HEPATITIS C AB TEST: CPT

## 2020-01-01 PROCEDURE — 63600175 PHARM REV CODE 636 W HCPCS: Performed by: HOSPITALIST

## 2020-01-01 PROCEDURE — 80053 COMPREHEN METABOLIC PANEL: CPT | Mod: 91

## 2020-01-01 PROCEDURE — 84134 ASSAY OF PREALBUMIN: CPT

## 2020-01-01 PROCEDURE — 86592 SYPHILIS TEST NON-TREP QUAL: CPT

## 2020-01-01 PROCEDURE — 82330 ASSAY OF CALCIUM: CPT

## 2020-01-01 PROCEDURE — 80069 RENAL FUNCTION PANEL: CPT | Mod: 91

## 2020-01-01 PROCEDURE — 63600175 PHARM REV CODE 636 W HCPCS

## 2020-01-01 PROCEDURE — 86644 CMV ANTIBODY: CPT

## 2020-01-01 PROCEDURE — 86850 RBC ANTIBODY SCREEN: CPT

## 2020-01-01 PROCEDURE — 80100008 HC CRRT DAILY MAINTENANCE

## 2020-01-01 PROCEDURE — 82728 ASSAY OF FERRITIN: CPT

## 2020-01-01 PROCEDURE — 86790 VIRUS ANTIBODY NOS: CPT

## 2020-01-01 PROCEDURE — 87340 HEPATITIS B SURFACE AG IA: CPT

## 2020-01-01 PROCEDURE — 85610 PROTHROMBIN TIME: CPT | Mod: 91

## 2020-01-01 PROCEDURE — 25000003 PHARM REV CODE 250: Performed by: INTERNAL MEDICINE

## 2020-01-01 PROCEDURE — 83540 ASSAY OF IRON: CPT

## 2020-01-01 PROCEDURE — 95720 PR EEG, W/VIDEO, CONT RECORD, I&R, >12<26 HRS: ICD-10-PCS | Mod: ,,, | Performed by: PSYCHIATRY & NEUROLOGY

## 2020-01-01 PROCEDURE — C1752 CATH,HEMODIALYSIS,SHORT-TERM: HCPCS

## 2020-01-01 PROCEDURE — 84295 ASSAY OF SERUM SODIUM: CPT

## 2020-01-01 PROCEDURE — 97530 THERAPEUTIC ACTIVITIES: CPT

## 2020-01-01 PROCEDURE — 83880 ASSAY OF NATRIURETIC PEPTIDE: CPT

## 2020-01-01 PROCEDURE — 93005 ELECTROCARDIOGRAM TRACING: CPT

## 2020-01-01 PROCEDURE — 84153 ASSAY OF PSA TOTAL: CPT

## 2020-01-01 PROCEDURE — 99233 SBSQ HOSP IP/OBS HIGH 50: CPT | Mod: GC,,, | Performed by: INTERNAL MEDICINE

## 2020-01-01 PROCEDURE — 86703 HIV-1/HIV-2 1 RESULT ANTBDY: CPT

## 2020-01-01 PROCEDURE — 87502 INFLUENZA DNA AMP PROBE: CPT

## 2020-01-01 PROCEDURE — 86480 TB TEST CELL IMMUN MEASURE: CPT

## 2020-01-01 RX ORDER — IPRATROPIUM BROMIDE AND ALBUTEROL SULFATE 2.5; .5 MG/3ML; MG/3ML
3 SOLUTION RESPIRATORY (INHALATION) EVERY 6 HOURS PRN
Status: DISCONTINUED | OUTPATIENT
Start: 2020-01-01 | End: 2020-01-01 | Stop reason: HOSPADM

## 2020-01-01 RX ORDER — PANTOPRAZOLE SODIUM 40 MG/10ML
40 INJECTION, POWDER, LYOPHILIZED, FOR SOLUTION INTRAVENOUS DAILY
Status: DISCONTINUED | OUTPATIENT
Start: 2020-01-01 | End: 2020-01-01 | Stop reason: HOSPADM

## 2020-01-01 RX ORDER — NALOXONE HCL 0.4 MG/ML
0.4 VIAL (ML) INJECTION ONCE
Status: COMPLETED | OUTPATIENT
Start: 2020-01-01 | End: 2020-01-01

## 2020-01-01 RX ORDER — HYDROMORPHONE HYDROCHLORIDE 1 MG/ML
0.5 INJECTION, SOLUTION INTRAMUSCULAR; INTRAVENOUS; SUBCUTANEOUS EVERY 6 HOURS PRN
Status: DISCONTINUED | OUTPATIENT
Start: 2020-01-01 | End: 2020-01-01

## 2020-01-01 RX ORDER — IBUPROFEN 200 MG
24 TABLET ORAL
Status: DISCONTINUED | OUTPATIENT
Start: 2020-01-01 | End: 2020-01-01 | Stop reason: HOSPADM

## 2020-01-01 RX ORDER — LACTULOSE 10 G/15ML
200 SOLUTION ORAL; RECTAL 3 TIMES DAILY
Status: DISCONTINUED | OUTPATIENT
Start: 2020-01-01 | End: 2020-01-01 | Stop reason: HOSPADM

## 2020-01-01 RX ORDER — HYDROCODONE BITARTRATE AND ACETAMINOPHEN 500; 5 MG/1; MG/1
TABLET ORAL CONTINUOUS
Status: ACTIVE | OUTPATIENT
Start: 2020-01-01 | End: 2020-01-01

## 2020-01-01 RX ORDER — OCTREOTIDE ACETATE 100 UG/ML
100 INJECTION, SOLUTION INTRAVENOUS; SUBCUTANEOUS EVERY 8 HOURS
Status: DISCONTINUED | OUTPATIENT
Start: 2020-01-01 | End: 2020-01-01

## 2020-01-01 RX ORDER — HYDROMORPHONE HYDROCHLORIDE 1 MG/ML
0.5 INJECTION, SOLUTION INTRAMUSCULAR; INTRAVENOUS; SUBCUTANEOUS ONCE
Status: DISCONTINUED | OUTPATIENT
Start: 2020-01-01 | End: 2020-01-01

## 2020-01-01 RX ORDER — METOPROLOL TARTRATE 1 MG/ML
5 INJECTION, SOLUTION INTRAVENOUS EVERY 5 MIN PRN
Status: COMPLETED | OUTPATIENT
Start: 2020-01-01 | End: 2020-01-01

## 2020-01-01 RX ORDER — PHENYLEPHRINE HYDROCHLORIDE 10 MG/ML
INJECTION INTRAVENOUS
Status: COMPLETED
Start: 2020-01-01 | End: 2020-01-01

## 2020-01-01 RX ORDER — SODIUM CHLORIDE 0.9 % (FLUSH) 0.9 %
10 SYRINGE (ML) INJECTION
Status: DISCONTINUED | OUTPATIENT
Start: 2020-01-01 | End: 2020-01-01 | Stop reason: HOSPADM

## 2020-01-01 RX ORDER — NALOXONE HCL 0.4 MG/ML
VIAL (ML) INJECTION
Status: COMPLETED
Start: 2020-01-01 | End: 2020-01-01

## 2020-01-01 RX ORDER — ATROPINE SULFATE 0.1 MG/ML
INJECTION INTRAVENOUS
Status: DISPENSED
Start: 2020-01-01 | End: 2020-01-01

## 2020-01-01 RX ORDER — ATROPINE SULFATE 0.1 MG/ML
INJECTION INTRAVENOUS
Status: COMPLETED
Start: 2020-01-01 | End: 2020-01-01

## 2020-01-01 RX ORDER — DILTIAZEM HCL 1 MG/ML
5 INJECTION, SOLUTION INTRAVENOUS CONTINUOUS
Status: DISCONTINUED | OUTPATIENT
Start: 2020-01-01 | End: 2020-01-01

## 2020-01-01 RX ORDER — HYDROCODONE BITARTRATE AND ACETAMINOPHEN 500; 5 MG/1; MG/1
TABLET ORAL CONTINUOUS
Status: DISCONTINUED | OUTPATIENT
Start: 2020-01-01 | End: 2020-01-01

## 2020-01-01 RX ORDER — IBUPROFEN 200 MG
16 TABLET ORAL
Status: DISCONTINUED | OUTPATIENT
Start: 2020-01-01 | End: 2020-01-01 | Stop reason: HOSPADM

## 2020-01-01 RX ORDER — MAGNESIUM SULFATE HEPTAHYDRATE 40 MG/ML
2 INJECTION, SOLUTION INTRAVENOUS
Status: DISCONTINUED | OUTPATIENT
Start: 2020-01-01 | End: 2020-01-01

## 2020-01-01 RX ORDER — HYDROMORPHONE HYDROCHLORIDE 1 MG/ML
0.5 INJECTION, SOLUTION INTRAMUSCULAR; INTRAVENOUS; SUBCUTANEOUS ONCE
Status: CANCELLED | OUTPATIENT
Start: 2020-01-01

## 2020-01-01 RX ORDER — NOREPINEPHRINE BITARTRATE/D5W 4MG/250ML
0.02 PLASTIC BAG, INJECTION (ML) INTRAVENOUS CONTINUOUS
Status: DISCONTINUED | OUTPATIENT
Start: 2020-01-01 | End: 2020-01-01

## 2020-01-01 RX ORDER — HEPARIN SODIUM 5000 [USP'U]/ML
5000 INJECTION, SOLUTION INTRAVENOUS; SUBCUTANEOUS EVERY 8 HOURS
Status: DISCONTINUED | OUTPATIENT
Start: 2020-01-01 | End: 2020-01-01

## 2020-01-01 RX ORDER — IPRATROPIUM BROMIDE AND ALBUTEROL SULFATE 2.5; .5 MG/3ML; MG/3ML
3 SOLUTION RESPIRATORY (INHALATION)
Status: DISCONTINUED | OUTPATIENT
Start: 2020-01-01 | End: 2020-01-01

## 2020-01-01 RX ORDER — ACETAMINOPHEN 325 MG/1
650 TABLET ORAL EVERY 4 HOURS PRN
Status: DISCONTINUED | OUTPATIENT
Start: 2020-01-01 | End: 2020-01-01

## 2020-01-01 RX ORDER — ACETAMINOPHEN 325 MG/1
650 TABLET ORAL EVERY 6 HOURS PRN
Status: DISCONTINUED | OUTPATIENT
Start: 2020-01-01 | End: 2020-01-01 | Stop reason: HOSPADM

## 2020-01-01 RX ORDER — ONDANSETRON 2 MG/ML
4 INJECTION INTRAMUSCULAR; INTRAVENOUS EVERY 8 HOURS PRN
Status: DISCONTINUED | OUTPATIENT
Start: 2020-01-01 | End: 2020-01-01 | Stop reason: HOSPADM

## 2020-01-01 RX ORDER — NOREPINEPHRINE BITARTRATE/D5W 4MG/250ML
PLASTIC BAG, INJECTION (ML) INTRAVENOUS
Status: COMPLETED
Start: 2020-01-01 | End: 2020-01-01

## 2020-01-01 RX ORDER — HYOSCYAMINE SULFATE 0.12 MG/1
0.12 TABLET SUBLINGUAL 3 TIMES DAILY
Status: DISCONTINUED | OUTPATIENT
Start: 2020-01-01 | End: 2020-01-01

## 2020-01-01 RX ORDER — HYDROMORPHONE HYDROCHLORIDE 2 MG/ML
INJECTION, SOLUTION INTRAMUSCULAR; INTRAVENOUS; SUBCUTANEOUS
Status: COMPLETED
Start: 2020-01-01 | End: 2020-01-01

## 2020-01-01 RX ORDER — HYDROMORPHONE HYDROCHLORIDE 1 MG/ML
0.2 INJECTION, SOLUTION INTRAMUSCULAR; INTRAVENOUS; SUBCUTANEOUS EVERY 4 HOURS PRN
Status: DISCONTINUED | OUTPATIENT
Start: 2020-01-01 | End: 2020-01-01

## 2020-01-01 RX ORDER — IPRATROPIUM BROMIDE AND ALBUTEROL SULFATE 2.5; .5 MG/3ML; MG/3ML
3 SOLUTION RESPIRATORY (INHALATION)
Status: DISCONTINUED | OUTPATIENT
Start: 2020-01-01 | End: 2020-01-01 | Stop reason: HOSPADM

## 2020-01-01 RX ORDER — HYDROMORPHONE HYDROCHLORIDE 1 MG/ML
0.5 INJECTION, SOLUTION INTRAMUSCULAR; INTRAVENOUS; SUBCUTANEOUS ONCE
Status: COMPLETED | OUTPATIENT
Start: 2020-01-01 | End: 2020-01-01

## 2020-01-01 RX ORDER — FUROSEMIDE 10 MG/ML
120 INJECTION INTRAMUSCULAR; INTRAVENOUS ONCE
Status: DISCONTINUED | OUTPATIENT
Start: 2020-01-01 | End: 2020-01-01

## 2020-01-01 RX ORDER — MIDODRINE HYDROCHLORIDE 5 MG/1
10 TABLET ORAL 3 TIMES DAILY
Status: DISCONTINUED | OUTPATIENT
Start: 2020-01-01 | End: 2020-01-01

## 2020-01-01 RX ORDER — GLUCAGON 1 MG
1 KIT INJECTION
Status: DISCONTINUED | OUTPATIENT
Start: 2020-01-01 | End: 2020-01-01 | Stop reason: HOSPADM

## 2020-01-01 RX ORDER — LACTULOSE 10 G/15ML
30 SOLUTION ORAL 3 TIMES DAILY
Status: DISCONTINUED | OUTPATIENT
Start: 2020-01-01 | End: 2020-01-01 | Stop reason: HOSPADM

## 2020-01-01 RX ORDER — DEXMEDETOMIDINE HYDROCHLORIDE 4 UG/ML
0.2 INJECTION, SOLUTION INTRAVENOUS CONTINUOUS
Status: DISCONTINUED | OUTPATIENT
Start: 2020-01-01 | End: 2020-01-01 | Stop reason: HOSPADM

## 2020-01-01 RX ORDER — LIDOCAINE HYDROCHLORIDE 20 MG/ML
JELLY TOPICAL 2 TIMES DAILY
Status: DISCONTINUED | OUTPATIENT
Start: 2020-01-01 | End: 2020-01-01 | Stop reason: HOSPADM

## 2020-01-01 RX ORDER — TRAMADOL HYDROCHLORIDE 50 MG/1
50 TABLET ORAL EVERY 6 HOURS PRN
Status: DISCONTINUED | OUTPATIENT
Start: 2020-01-01 | End: 2020-01-01

## 2020-01-01 RX ORDER — PHENYLEPHRINE HCL IN 0.9% NACL 20MG/250ML
0.5 PLASTIC BAG, INJECTION (ML) INTRAVENOUS CONTINUOUS
Status: DISCONTINUED | OUTPATIENT
Start: 2020-01-01 | End: 2020-01-01 | Stop reason: HOSPADM

## 2020-01-01 RX ORDER — MIDODRINE HYDROCHLORIDE 5 MG/1
15 TABLET ORAL 3 TIMES DAILY
Status: DISCONTINUED | OUTPATIENT
Start: 2020-01-01 | End: 2020-01-01 | Stop reason: HOSPADM

## 2020-01-01 RX ORDER — LACTULOSE 10 G/15ML
30 SOLUTION ORAL 3 TIMES DAILY
Status: DISCONTINUED | OUTPATIENT
Start: 2020-01-01 | End: 2020-01-01

## 2020-01-01 RX ORDER — HYOSCYAMINE SULFATE 0.12 MG/ML
0.12 LIQUID ORAL 3 TIMES DAILY
Status: DISCONTINUED | OUTPATIENT
Start: 2020-01-01 | End: 2020-01-01 | Stop reason: HOSPADM

## 2020-01-01 RX ORDER — HYDROMORPHONE HYDROCHLORIDE 1 MG/ML
0.5 INJECTION, SOLUTION INTRAMUSCULAR; INTRAVENOUS; SUBCUTANEOUS
Status: DISCONTINUED | OUTPATIENT
Start: 2020-01-01 | End: 2020-01-01 | Stop reason: HOSPADM

## 2020-01-01 RX ORDER — BALSAM PERU/CASTOR OIL
OINTMENT (GRAM) TOPICAL 2 TIMES DAILY
Status: DISCONTINUED | OUTPATIENT
Start: 2020-01-01 | End: 2020-01-01 | Stop reason: HOSPADM

## 2020-01-01 RX ORDER — ATROPINE SULFATE 0.4 MG/ML
0.5 INJECTION, SOLUTION ENDOTRACHEAL; INTRAMEDULLARY; INTRAMUSCULAR; INTRAVENOUS; SUBCUTANEOUS
Status: DISCONTINUED | OUTPATIENT
Start: 2020-01-01 | End: 2020-01-01 | Stop reason: HOSPADM

## 2020-01-01 RX ORDER — METOPROLOL TARTRATE 1 MG/ML
INJECTION, SOLUTION INTRAVENOUS
Status: COMPLETED
Start: 2020-01-01 | End: 2020-01-01

## 2020-01-01 RX ORDER — ZINC SULFATE 50(220)MG
220 CAPSULE ORAL DAILY
Status: DISCONTINUED | OUTPATIENT
Start: 2020-01-01 | End: 2020-01-01 | Stop reason: HOSPADM

## 2020-01-01 RX ORDER — MAGNESIUM SULFATE HEPTAHYDRATE 40 MG/ML
2 INJECTION, SOLUTION INTRAVENOUS
Status: ACTIVE | OUTPATIENT
Start: 2020-01-01 | End: 2020-01-01

## 2020-01-01 RX ADMIN — IPRATROPIUM BROMIDE AND ALBUTEROL SULFATE 3 ML: .5; 3 SOLUTION RESPIRATORY (INHALATION) at 07:02

## 2020-01-01 RX ADMIN — NOREPINEPHRINE BITARTRATE 0.3 MCG/KG/MIN: 1 INJECTION, SOLUTION, CONCENTRATE INTRAVENOUS at 01:03

## 2020-01-01 RX ADMIN — LIDOCAINE HYDROCHLORIDE: 20 JELLY TOPICAL at 08:02

## 2020-01-01 RX ADMIN — RIFAXIMIN 550 MG: 550 TABLET ORAL at 09:02

## 2020-01-01 RX ADMIN — OCTREOTIDE ACETATE 100 MCG: 100 INJECTION, SOLUTION INTRAVENOUS; SUBCUTANEOUS at 09:02

## 2020-01-01 RX ADMIN — IPRATROPIUM BROMIDE AND ALBUTEROL SULFATE 3 ML: .5; 3 SOLUTION RESPIRATORY (INHALATION) at 08:03

## 2020-01-01 RX ADMIN — VASOPRESSIN 0.04 UNITS/MIN: 20 INJECTION INTRAVENOUS at 09:03

## 2020-01-01 RX ADMIN — RIFAXIMIN 550 MG: 550 TABLET ORAL at 08:03

## 2020-01-01 RX ADMIN — VASOPRESSIN 0.04 UNITS/MIN: 20 INJECTION INTRAVENOUS at 12:03

## 2020-01-01 RX ADMIN — VASOPRESSIN 0.04 UNITS/MIN: 20 INJECTION INTRAVENOUS at 02:03

## 2020-01-01 RX ADMIN — LIDOCAINE HYDROCHLORIDE: 20 JELLY TOPICAL at 08:03

## 2020-01-01 RX ADMIN — SODIUM CHLORIDE: 0.9 INJECTION, SOLUTION INTRAVENOUS at 11:02

## 2020-01-01 RX ADMIN — Medication 0.4 MG: at 02:02

## 2020-01-01 RX ADMIN — NOREPINEPHRINE BITARTRATE 0.3 MCG/KG/MIN: 1 INJECTION, SOLUTION, CONCENTRATE INTRAVENOUS at 04:03

## 2020-01-01 RX ADMIN — MIDODRINE HYDROCHLORIDE 15 MG: 5 TABLET ORAL at 08:03

## 2020-01-01 RX ADMIN — ZINC SULFATE 220 MG (50 MG) CAPSULE 220 MG: CAPSULE at 08:02

## 2020-01-01 RX ADMIN — MIDODRINE HYDROCHLORIDE 15 MG: 5 TABLET ORAL at 09:02

## 2020-01-01 RX ADMIN — MIDODRINE HYDROCHLORIDE 15 MG: 5 TABLET ORAL at 08:02

## 2020-01-01 RX ADMIN — HYDROMORPHONE HYDROCHLORIDE 0.5 MG: 1 INJECTION, SOLUTION INTRAMUSCULAR; INTRAVENOUS; SUBCUTANEOUS at 03:02

## 2020-01-01 RX ADMIN — HYDROMORPHONE HYDROCHLORIDE 0.5 MG: 1 INJECTION, SOLUTION INTRAMUSCULAR; INTRAVENOUS; SUBCUTANEOUS at 11:03

## 2020-01-01 RX ADMIN — HEPARIN SODIUM 5000 UNITS: 5000 INJECTION, SOLUTION INTRAVENOUS; SUBCUTANEOUS at 01:02

## 2020-01-01 RX ADMIN — DEXMEDETOMIDINE HYDROCHLORIDE 1 MCG/KG/HR: 100 INJECTION, SOLUTION, CONCENTRATE INTRAVENOUS at 09:03

## 2020-01-01 RX ADMIN — RIFAXIMIN 550 MG: 550 TABLET ORAL at 08:02

## 2020-01-01 RX ADMIN — LACTULOSE 200 G: 10 SOLUTION ORAL at 02:02

## 2020-01-01 RX ADMIN — Medication 0.14 MCG/KG/MIN: at 02:02

## 2020-01-01 RX ADMIN — SODIUM CHLORIDE: 0.9 INJECTION, SOLUTION INTRAVENOUS at 08:02

## 2020-01-01 RX ADMIN — OCTREOTIDE ACETATE 100 MCG: 100 INJECTION, SOLUTION INTRAVENOUS; SUBCUTANEOUS at 01:02

## 2020-01-01 RX ADMIN — HEPARIN SODIUM 5000 UNITS: 5000 INJECTION, SOLUTION INTRAVENOUS; SUBCUTANEOUS at 06:02

## 2020-01-01 RX ADMIN — HYOSCYAMINE SULFATE 0.12 MG: 0.12 SOLUTION/ DROPS ORAL at 08:03

## 2020-01-01 RX ADMIN — OCTREOTIDE ACETATE 100 MCG: 100 INJECTION, SOLUTION INTRAVENOUS; SUBCUTANEOUS at 06:02

## 2020-01-01 RX ADMIN — MIDODRINE HYDROCHLORIDE 15 MG: 5 TABLET ORAL at 02:02

## 2020-01-01 RX ADMIN — CEFTRIAXONE 2 G: 2 INJECTION, SOLUTION INTRAVENOUS at 09:02

## 2020-01-01 RX ADMIN — PIPERACILLIN AND TAZOBACTAM 4.5 G: 4; .5 INJECTION, POWDER, LYOPHILIZED, FOR SOLUTION INTRAVENOUS; PARENTERAL at 09:02

## 2020-01-01 RX ADMIN — PHENYLEPHRINE HYDROCHLORIDE 5 MCG/KG/MIN: 10 INJECTION INTRAVENOUS at 02:03

## 2020-01-01 RX ADMIN — AMIODARONE HYDROCHLORIDE 1 MG/MIN: 1.8 INJECTION, SOLUTION INTRAVENOUS at 08:03

## 2020-01-01 RX ADMIN — Medication 0.14 MCG/KG/MIN: at 09:02

## 2020-01-01 RX ADMIN — DEXMEDETOMIDINE HYDROCHLORIDE 0.3 MCG/KG/HR: 100 INJECTION, SOLUTION, CONCENTRATE INTRAVENOUS at 05:02

## 2020-01-01 RX ADMIN — PIPERACILLIN AND TAZOBACTAM 4.5 G: 4; .5 INJECTION, POWDER, LYOPHILIZED, FOR SOLUTION INTRAVENOUS; PARENTERAL at 12:03

## 2020-01-01 RX ADMIN — CASTOR OIL AND BALSAM, PERU: 788; 87 OINTMENT TOPICAL at 08:02

## 2020-01-01 RX ADMIN — HYOSCYAMINE SULFATE 0.12 MG: 0.12 TABLET ORAL; SUBLINGUAL at 02:02

## 2020-01-01 RX ADMIN — HYOSCYAMINE SULFATE 0.12 MG: 0.12 SOLUTION/ DROPS ORAL at 02:02

## 2020-01-01 RX ADMIN — CASTOR OIL AND BALSAM, PERU: 788; 87 OINTMENT TOPICAL at 08:03

## 2020-01-01 RX ADMIN — NOREPINEPHRINE BITARTRATE 0.2 MCG/KG/MIN: 1 INJECTION, SOLUTION, CONCENTRATE INTRAVENOUS at 12:03

## 2020-01-01 RX ADMIN — DEXMEDETOMIDINE HYDROCHLORIDE 1 MCG/KG/HR: 100 INJECTION, SOLUTION, CONCENTRATE INTRAVENOUS at 04:03

## 2020-01-01 RX ADMIN — HYOSCYAMINE SULFATE 0.12 MG: 0.12 SOLUTION/ DROPS ORAL at 09:02

## 2020-01-01 RX ADMIN — LACTULOSE 200 G: 10 SOLUTION ORAL at 08:03

## 2020-01-01 RX ADMIN — IPRATROPIUM BROMIDE AND ALBUTEROL SULFATE 3 ML: .5; 3 SOLUTION RESPIRATORY (INHALATION) at 09:02

## 2020-01-01 RX ADMIN — AMIODARONE HYDROCHLORIDE 1 MG/MIN: 1.8 INJECTION, SOLUTION INTRAVENOUS at 03:03

## 2020-01-01 RX ADMIN — CASTOR OIL AND BALSAM, PERU: 788; 87 OINTMENT TOPICAL at 09:03

## 2020-01-01 RX ADMIN — IPRATROPIUM BROMIDE AND ALBUTEROL SULFATE 3 ML: .5; 3 SOLUTION RESPIRATORY (INHALATION) at 05:02

## 2020-01-01 RX ADMIN — HYDROMORPHONE HYDROCHLORIDE 0.5 MG: 1 INJECTION, SOLUTION INTRAMUSCULAR; INTRAVENOUS; SUBCUTANEOUS at 09:02

## 2020-01-01 RX ADMIN — LACTULOSE 30 G: 20 SOLUTION ORAL at 09:02

## 2020-01-01 RX ADMIN — IPRATROPIUM BROMIDE AND ALBUTEROL SULFATE 3 ML: .5; 3 SOLUTION RESPIRATORY (INHALATION) at 12:02

## 2020-01-01 RX ADMIN — VANCOMYCIN HYDROCHLORIDE 1500 MG: 1.5 INJECTION, POWDER, LYOPHILIZED, FOR SOLUTION INTRAVENOUS at 08:02

## 2020-01-01 RX ADMIN — VANCOMYCIN HYDROCHLORIDE 1500 MG: 1.5 INJECTION, POWDER, LYOPHILIZED, FOR SOLUTION INTRAVENOUS at 09:02

## 2020-01-01 RX ADMIN — AMIODARONE HYDROCHLORIDE 1 MG/MIN: 1.8 INJECTION, SOLUTION INTRAVENOUS at 07:03

## 2020-01-01 RX ADMIN — DEXMEDETOMIDINE HYDROCHLORIDE 1 MCG/KG/HR: 100 INJECTION, SOLUTION, CONCENTRATE INTRAVENOUS at 12:03

## 2020-01-01 RX ADMIN — VASOPRESSIN 0.04 UNITS/MIN: 20 INJECTION INTRAVENOUS at 05:03

## 2020-01-01 RX ADMIN — PHENYLEPHRINE HYDROCHLORIDE 0.7 MCG/KG/MIN: 10 INJECTION INTRAVENOUS at 07:03

## 2020-01-01 RX ADMIN — DEXMEDETOMIDINE HYDROCHLORIDE 1 MCG/KG/HR: 100 INJECTION, SOLUTION, CONCENTRATE INTRAVENOUS at 11:02

## 2020-01-01 RX ADMIN — NALOXONE HYDROCHLORIDE 0.4 MG: 0.4 INJECTION, SOLUTION INTRAMUSCULAR; INTRAVENOUS; SUBCUTANEOUS at 02:02

## 2020-01-01 RX ADMIN — PIPERACILLIN AND TAZOBACTAM 4.5 G: 4; .5 INJECTION, POWDER, LYOPHILIZED, FOR SOLUTION INTRAVENOUS; PARENTERAL at 08:03

## 2020-01-01 RX ADMIN — PHENYLEPHRINE HYDROCHLORIDE 5 MCG/KG/MIN: 10 INJECTION INTRAVENOUS at 03:03

## 2020-01-01 RX ADMIN — LACTULOSE 30 G: 20 SOLUTION ORAL at 03:03

## 2020-01-01 RX ADMIN — HYOSCYAMINE SULFATE 0.12 MG: 0.12 SOLUTION/ DROPS ORAL at 08:02

## 2020-01-01 RX ADMIN — AMIODARONE HYDROCHLORIDE 1 MG/MIN: 1.8 INJECTION, SOLUTION INTRAVENOUS at 12:03

## 2020-01-01 RX ADMIN — MIDODRINE HYDROCHLORIDE 15 MG: 5 TABLET ORAL at 03:03

## 2020-01-01 RX ADMIN — PHENYLEPHRINE HYDROCHLORIDE 0.5 MCG/KG/MIN: 10 INJECTION INTRAVENOUS at 06:03

## 2020-01-01 RX ADMIN — PHENYLEPHRINE HYDROCHLORIDE 2 MCG/KG/MIN: 10 INJECTION INTRAVENOUS at 10:03

## 2020-01-01 RX ADMIN — VASOPRESSIN 0.04 UNITS/MIN: 20 INJECTION INTRAVENOUS at 07:02

## 2020-01-01 RX ADMIN — PHENYLEPHRINE HYDROCHLORIDE 5 MCG/KG/MIN: 10 INJECTION INTRAVENOUS at 01:03

## 2020-01-01 RX ADMIN — IPRATROPIUM BROMIDE AND ALBUTEROL SULFATE 3 ML: .5; 3 SOLUTION RESPIRATORY (INHALATION) at 03:02

## 2020-01-01 RX ADMIN — LACTULOSE 200 G: 10 SOLUTION ORAL at 09:02

## 2020-01-01 RX ADMIN — HYDROMORPHONE HYDROCHLORIDE 0.5 MG: 2 INJECTION INTRAMUSCULAR; INTRAVENOUS; SUBCUTANEOUS at 12:02

## 2020-01-01 RX ADMIN — ZINC SULFATE 220 MG (50 MG) CAPSULE 220 MG: CAPSULE at 10:02

## 2020-01-01 RX ADMIN — METOPROLOL TARTRATE 5 MG: 5 INJECTION INTRAVENOUS at 01:03

## 2020-01-01 RX ADMIN — PHENYLEPHRINE HYDROCHLORIDE 5 MCG/KG/MIN: 10 INJECTION INTRAVENOUS at 10:03

## 2020-01-01 RX ADMIN — PHYTONADIONE 10 MG: 10 INJECTION, EMULSION INTRAMUSCULAR; INTRAVENOUS; SUBCUTANEOUS at 08:03

## 2020-01-01 RX ADMIN — SODIUM CHLORIDE: 0.9 INJECTION, SOLUTION INTRAVENOUS at 08:03

## 2020-01-01 RX ADMIN — LIDOCAINE HYDROCHLORIDE 10 ML: 20 JELLY TOPICAL at 09:02

## 2020-01-01 RX ADMIN — DEXTROSE 125 ML: 10 SOLUTION INTRAVENOUS at 06:02

## 2020-01-01 RX ADMIN — PIPERACILLIN AND TAZOBACTAM 4.5 G: 4; .5 INJECTION, POWDER, LYOPHILIZED, FOR SOLUTION INTRAVENOUS; PARENTERAL at 01:02

## 2020-01-01 RX ADMIN — PANTOPRAZOLE SODIUM 40 MG: 40 INJECTION, POWDER, FOR SOLUTION INTRAVENOUS at 08:03

## 2020-01-01 RX ADMIN — DEXMEDETOMIDINE HYDROCHLORIDE 1 MCG/KG/HR: 100 INJECTION, SOLUTION, CONCENTRATE INTRAVENOUS at 08:03

## 2020-01-01 RX ADMIN — NOREPINEPHRINE BITARTRATE 3 MCG/KG/MIN: 1 INJECTION, SOLUTION, CONCENTRATE INTRAVENOUS at 12:03

## 2020-01-01 RX ADMIN — AZITHROMYCIN MONOHYDRATE 500 MG: 500 INJECTION, POWDER, LYOPHILIZED, FOR SOLUTION INTRAVENOUS at 08:02

## 2020-01-01 RX ADMIN — LACTULOSE 200 G: 10 SOLUTION ORAL at 03:03

## 2020-01-01 RX ADMIN — HYOSCYAMINE SULFATE 0.12 MG: 0.12 SOLUTION/ DROPS ORAL at 03:03

## 2020-01-01 RX ADMIN — DEXMEDETOMIDINE HYDROCHLORIDE 0.7 MCG/KG/HR: 100 INJECTION, SOLUTION, CONCENTRATE INTRAVENOUS at 10:02

## 2020-01-01 RX ADMIN — VASOPRESSIN 0.04 UNITS/MIN: 20 INJECTION INTRAVENOUS at 04:03

## 2020-01-01 RX ADMIN — DEXMEDETOMIDINE HYDROCHLORIDE 0.2 MCG/KG/HR: 100 INJECTION, SOLUTION, CONCENTRATE INTRAVENOUS at 04:02

## 2020-01-01 RX ADMIN — PHENYLEPHRINE HYDROCHLORIDE: 10 INJECTION INTRAVENOUS at 03:03

## 2020-01-01 RX ADMIN — PANTOPRAZOLE SODIUM 40 MG: 40 INJECTION, POWDER, FOR SOLUTION INTRAVENOUS at 08:02

## 2020-01-01 RX ADMIN — HYDROMORPHONE HYDROCHLORIDE 0.5 MG: 1 INJECTION, SOLUTION INTRAMUSCULAR; INTRAVENOUS; SUBCUTANEOUS at 03:03

## 2020-01-01 RX ADMIN — AMIODARONE HYDROCHLORIDE 150 MG: 1.5 INJECTION, SOLUTION INTRAVENOUS at 03:03

## 2020-01-01 RX ADMIN — DEXMEDETOMIDINE HYDROCHLORIDE 1 MCG/KG/HR: 100 INJECTION, SOLUTION, CONCENTRATE INTRAVENOUS at 10:03

## 2020-01-01 RX ADMIN — LACTULOSE 30 G: 20 SOLUTION ORAL at 08:02

## 2020-01-01 RX ADMIN — HYDROMORPHONE HYDROCHLORIDE 0.2 MG: 1 INJECTION, SOLUTION INTRAMUSCULAR; INTRAVENOUS; SUBCUTANEOUS at 02:02

## 2020-01-01 RX ADMIN — METOPROLOL TARTRATE 5 MG: 5 INJECTION INTRAVENOUS at 02:03

## 2020-01-01 RX ADMIN — PHENYLEPHRINE HYDROCHLORIDE 5 MCG/KG/MIN: 10 INJECTION INTRAVENOUS at 12:03

## 2020-01-01 RX ADMIN — LACTULOSE 30 G: 20 SOLUTION ORAL at 08:03

## 2020-01-01 RX ADMIN — PIPERACILLIN AND TAZOBACTAM 4.5 G: 4; .5 INJECTION, POWDER, LYOPHILIZED, FOR SOLUTION INTRAVENOUS; PARENTERAL at 05:03

## 2020-01-01 RX ADMIN — MIDODRINE HYDROCHLORIDE 10 MG: 5 TABLET ORAL at 09:02

## 2020-01-01 RX ADMIN — VANCOMYCIN HYDROCHLORIDE 1500 MG: 1.5 INJECTION, POWDER, LYOPHILIZED, FOR SOLUTION INTRAVENOUS at 08:03

## 2020-01-01 RX ADMIN — IPRATROPIUM BROMIDE AND ALBUTEROL SULFATE 3 ML: .5; 3 SOLUTION RESPIRATORY (INHALATION) at 02:02

## 2020-01-01 RX ADMIN — IPRATROPIUM BROMIDE AND ALBUTEROL SULFATE 3 ML: .5; 3 SOLUTION RESPIRATORY (INHALATION) at 09:03

## 2020-01-01 RX ADMIN — SODIUM CHLORIDE: 0.9 INJECTION, SOLUTION INTRAVENOUS at 12:02

## 2020-01-01 RX ADMIN — HEPARIN SODIUM 5000 UNITS: 5000 INJECTION, SOLUTION INTRAVENOUS; SUBCUTANEOUS at 09:02

## 2020-01-01 RX ADMIN — DEXMEDETOMIDINE HYDROCHLORIDE 1 MCG/KG/HR: 100 INJECTION, SOLUTION, CONCENTRATE INTRAVENOUS at 06:03

## 2020-01-01 RX ADMIN — Medication 0.02 MCG/KG/MIN: at 05:02

## 2020-01-01 RX ADMIN — ZINC SULFATE 220 MG (50 MG) CAPSULE 220 MG: CAPSULE at 08:03

## 2020-01-01 RX ADMIN — PIPERACILLIN AND TAZOBACTAM 4.5 G: 4; .5 INJECTION, POWDER, LYOPHILIZED, FOR SOLUTION INTRAVENOUS; PARENTERAL at 12:02

## 2020-01-01 RX ADMIN — HYDROMORPHONE HYDROCHLORIDE 0.5 MG: 1 INJECTION, SOLUTION INTRAMUSCULAR; INTRAVENOUS; SUBCUTANEOUS at 08:03

## 2020-01-01 RX ADMIN — HYDROMORPHONE HYDROCHLORIDE 0.5 MG: 1 INJECTION, SOLUTION INTRAMUSCULAR; INTRAVENOUS; SUBCUTANEOUS at 11:02

## 2020-01-01 RX ADMIN — CASTOR OIL AND BALSAM, PERU: 788; 87 OINTMENT TOPICAL at 09:02

## 2020-01-01 RX ADMIN — TRAMADOL HYDROCHLORIDE 50 MG: 50 TABLET, FILM COATED ORAL at 11:02

## 2020-01-01 RX ADMIN — LACTULOSE 200 G: 10 SOLUTION ORAL at 08:02

## 2020-01-01 RX ADMIN — HYDROMORPHONE HYDROCHLORIDE 0.5 MG: 1 INJECTION, SOLUTION INTRAMUSCULAR; INTRAVENOUS; SUBCUTANEOUS at 12:03

## 2020-01-01 RX ADMIN — IPRATROPIUM BROMIDE AND ALBUTEROL SULFATE 3 ML: .5; 3 SOLUTION RESPIRATORY (INHALATION) at 07:03

## 2020-01-01 RX ADMIN — Medication 0.08 MCG/KG/MIN: at 09:02

## 2020-01-01 RX ADMIN — LACTULOSE 30 G: 20 SOLUTION ORAL at 02:02

## 2020-01-01 RX ADMIN — SODIUM CHLORIDE: 0.9 INJECTION, SOLUTION INTRAVENOUS at 05:02

## 2020-01-01 RX ADMIN — AMIODARONE HYDROCHLORIDE 0.5 MG/MIN: 1.8 INJECTION, SOLUTION INTRAVENOUS at 09:03

## 2020-01-01 RX ADMIN — NOREPINEPHRINE BITARTRATE 3 MCG/KG/MIN: 1 INJECTION, SOLUTION, CONCENTRATE INTRAVENOUS at 11:03

## 2020-01-01 RX ADMIN — HYDROMORPHONE HYDROCHLORIDE 0.5 MG: 1 INJECTION, SOLUTION INTRAMUSCULAR; INTRAVENOUS; SUBCUTANEOUS at 12:02

## 2020-01-01 RX ADMIN — IPRATROPIUM BROMIDE AND ALBUTEROL SULFATE 3 ML: .5; 3 SOLUTION RESPIRATORY (INHALATION) at 08:02

## 2020-01-01 RX ADMIN — Medication 0.14 MCG/KG/MIN: at 04:02

## 2020-01-01 RX ADMIN — DEXMEDETOMIDINE HYDROCHLORIDE 1 MCG/KG/HR: 100 INJECTION, SOLUTION, CONCENTRATE INTRAVENOUS at 03:03

## 2020-01-01 RX ADMIN — PHYTONADIONE 10 MG: 10 INJECTION, EMULSION INTRAMUSCULAR; INTRAVENOUS; SUBCUTANEOUS at 12:02

## 2020-01-01 RX ADMIN — PIPERACILLIN AND TAZOBACTAM 4.5 G: 4; .5 INJECTION, POWDER, LYOPHILIZED, FOR SOLUTION INTRAVENOUS; PARENTERAL at 04:02

## 2020-01-01 RX ADMIN — NOREPINEPHRINE BITARTRATE 3 MCG/KG/MIN: 1 INJECTION, SOLUTION, CONCENTRATE INTRAVENOUS at 02:03

## 2020-01-01 RX ADMIN — NOREPINEPHRINE BITARTRATE 0.05 MCG/KG/MIN: 1 INJECTION, SOLUTION, CONCENTRATE INTRAVENOUS at 11:02

## 2020-01-01 RX ADMIN — VASOPRESSIN 0.04 UNITS/MIN: 20 INJECTION INTRAVENOUS at 01:02

## 2020-01-01 RX ADMIN — PHENYLEPHRINE HYDROCHLORIDE 0.5 MCG/KG/MIN: 10 INJECTION INTRAVENOUS at 12:03

## 2020-01-01 RX ADMIN — IPRATROPIUM BROMIDE AND ALBUTEROL SULFATE 3 ML: .5; 3 SOLUTION RESPIRATORY (INHALATION) at 11:02

## 2020-01-01 RX ADMIN — Medication 0.12 MCG/KG/MIN: at 01:02

## 2020-01-01 RX ADMIN — LIDOCAINE HYDROCHLORIDE: 20 JELLY TOPICAL at 02:02

## 2020-02-26 NOTE — TELEPHONE ENCOUNTER
Discussed in liver transplant committee.  History of decompensated THOMAS cirrhosis.   Patient was assessed at other transplant centers but was declined due to high risk for cardiac history.   Patient had recent CABG surgery. Patient would like to be evaluated at [facility]. Per selection committee, patient's care can be transferred here to be managed medically.   If patient's condition improves, patient may not need transplant.

## 2020-02-26 NOTE — PLAN OF CARE
(Physician in Lead of Transfers)   Outside Transfer Acceptance Note / Regional Referral Center    Upon patient arrival to floor, please call extension [phone] (if no answer, this will flip to a beeper, so enter your call back number) for Hospital Medicine admit team assignment and for additional admit orders for the patient.  Do not page the attending physician associated with the patient on arrival (this physician may not be on duty at the time of arrival).  Rather, always call [phone] to reach the triage physician for orders and team assignment.    Transferring Physician:     Accepting Physician: Yanet Loredo MD    Date of Acceptance: 02/26/2020    Transferring Facility: [facility]     Reason for Transfer: transplant work up    Report from Transferring Physician/Hospital course:  The patient is a 69-year-old white male with past medical history of cirrhosis/liver failure due to THOMAS, history of hepatic encephalopathy, variceal bleeding, diabetes, hypothyroidism, anxiety/depression, hypotension, A-fib, who presented to [facility] in [place] as a transfer from [place] for post CABG surgery complications. Regarding patients THOMAS cirrhosis, he was previously worked up with liver transplant and was not a candidate due to multiple comorbidities including multi vessel coronary artery disease which required CABG. Cardiovascular surgeons thought the patient was high risk and did not want to perform the procedure. The patient went to [facility] for a second opinion in which they also said the patient was too high risk. The patient went to [facility] in [place] and eventually got Dr. Biggs who did perform a 4-vessel CABG on the patient. This was done 2/11/2020. After the procedure the patient had multiple complications including pneumothorax which seems to have resolved with drains now removed (just some residual subcutaneous air on the left), SALOMON, and  decompensated liver failure (MELD 30). He was then transferred to [facility] for transplant hepatology evaluation. He has now stabilized and doing well working with physical therapy. Complications from his surgery has resolved and ongoing issues that remain are his SALOMON (combination of dehydration and HRS) and his decompensated cirrhosis. Patient is not encephalopathic. Now that the patient is revascularized transplant hepatology is willing to evaluate him and accepted transfer.      Labs & Radiographs: see EPIC    Significant Labs:     Na 137  K 3.9  BUN/Cr 146/2.6    AST 62  ALT 26  T.bili 12.7    WBC 7  H/H 8.1/24  plt 68    INR 1.9        To Do List:   1. Admit to   2. Consult hepatology  3. Consult nephrology for SALOMON  4. Management of A-fib, Anemia, DM, hypothyroidism      Upon patient arrival to floor, please call extension [phone] (if no answer, this will flip to a beeper, so enter your call back number) for Hospital Medicine admit team assignment and for additional admit orders for the patient.  Do not page the attending physician associated with the patient on arrival (this physician may not be on duty at the time of arrival).  Rather, always call [phone] to reach the triage physician for orders and team assignment.      Yanet Loredo MD  Hospital Medicine Staff

## 2020-02-27 PROBLEM — D69.6 THROMBOCYTOPENIA: Status: ACTIVE | Noted: 2020-01-01

## 2020-02-27 PROBLEM — K74.60 CIRRHOSIS: Status: ACTIVE | Noted: 2020-01-01

## 2020-02-27 PROBLEM — R10.9 ABDOMINAL PAIN: Status: ACTIVE | Noted: 2020-01-01

## 2020-02-27 PROBLEM — K74.69 DECOMPENSATED HCV CIRRHOSIS: Status: ACTIVE | Noted: 2020-01-01

## 2020-02-27 PROBLEM — J96.01 ACUTE RESPIRATORY FAILURE WITH HYPOXIA: Status: ACTIVE | Noted: 2020-01-01

## 2020-02-27 PROBLEM — B19.20 DECOMPENSATED HCV CIRRHOSIS: Status: ACTIVE | Noted: 2020-01-01

## 2020-02-27 PROBLEM — N48.89 PENILE PAIN: Status: ACTIVE | Noted: 2020-01-01

## 2020-02-27 PROBLEM — I25.709 CORONARY ARTERY DISEASE INVOLVING CORONARY BYPASS GRAFT OF NATIVE HEART WITH ANGINA PECTORIS: Status: ACTIVE | Noted: 2020-01-01

## 2020-02-27 PROBLEM — N17.9 AKI (ACUTE KIDNEY INJURY): Status: ACTIVE | Noted: 2020-01-01

## 2020-02-27 NOTE — NURSING
PHARM.D. PRE-TRANSPLANT NOTE:    This patient's medication therapy was evaluated as part of his pre-transplant evaluation.      The following general pharmacologic concerns were noted: None    The following pharmacologic concerns related to HCV therapy were noted: None      This patient's medication profile was reviewed for considerations for DAA Hepatitis C therapy:    [X]  No current inducers of CYP 3A4 or PGP  [X]  No amiodarone on this patient's EMR profile in the last 24 months  [YES]  No past or current atrial fibrillation on this patient's EMR profile       No current facility-administered medications for this visit.      No current outpatient medications on file.     Facility-Administered Medications Ordered in Other Visits   Medication Dose Route Frequency Provider Last Rate Last Dose    acetaminophen tablet 650 mg  650 mg Oral Q4H PRN Keaton Richter MD        albuterol-ipratropium 2.5 mg-0.5 mg/3 mL nebulizer solution 3 mL  3 mL Nebulization Q4H WAKE Keaton Richter MD        dextrose 10% (D10W) Bolus  12.5 g Intravenous PRN Keaton Richter MD        dextrose 10% (D10W) Bolus  25 g Intravenous PRN Keaton Richter MD        glucagon (human recombinant) injection 1 mg  1 mg Intramuscular PRN Keaton Richter MD        glucose chewable tablet 16 g  16 g Oral PRN Keaton Richter MD        glucose chewable tablet 24 g  24 g Oral PRN Keaton Richter MD        HYDROmorphone injection 0.2 mg  0.2 mg Intravenous Q4H PRN Keaton Richter MD        HYDROmorphone injection 0.5 mg  0.5 mg Intravenous Once Keaton Richter MD        hyoscyamine ODT 0.125 mg  0.125 mg Sublingual TID Keaton Richter MD   0.125 mg at 02/27/20 1422    lactulose 20 gram/30 mL solution Soln 30 g  30 g Per NG tube TID Keaton Richter MD        lidocaine HCl 2% urojet   Mucous Membrane BID Keaton Richter MD        midodrine tablet 10 mg  10 mg Per NG tube TID Keaton Richter MD        octreotide injection 100 mcg  100 mcg Subcutaneous Q8H  Keaton Richter MD        ondansetron injection 4 mg  4 mg Intravenous Q8H PRN Keaton Richter MD        sodium chloride 0.9% flush 10 mL  10 mL Intravenous PRN Keaton Richter MD        traMADol tablet 50 mg  50 mg Oral Q6H PRN Keaton Richter MD   50 mg at 02/27/20 1128         Currently the patient is responsible for preparing / administering this patient's medications on a daily basis.  I am available for consultation and can be contacted, as needed by the other members of the Liver Transplant team.

## 2020-02-27 NOTE — ASSESSMENT & PLAN NOTE
Severe thrombocytopenia 69. Likely secondary to cirrhosis, 120 in Jan, 2020  Hold DVT PPX for now. Unclear if HIT  Daily CBC

## 2020-02-27 NOTE — NURSING
Patient returned from US via stretcher, accompanied by transporter. Patient returned to bed, respirations shallow. Only responding to painful stimuli. O2 sats 90. Heart rate 49. Charge nurse and Dr Richter notified, now at bedside.

## 2020-02-27 NOTE — ASSESSMENT & PLAN NOTE
69 y.o. 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 Urology was consulted due to penile pain.    - Recommend felix removal and voiding trial ASAP. If patient fails, bladder scan is unreliable due to ascites. Recommend clean intermittent catheterization if so.  - Difficult to fully assess penile pain as patient is encephalopathic.   - IPP components do not appear to be eroding or infected based on clinical exam and CT.  - Recommend PRN Urojet applied at the penile meatus.  - Trend Cr and UOP.  - Urology will sign off at this time.

## 2020-02-27 NOTE — SUBJECTIVE & OBJECTIVE
No past medical history on file.    No past surgical history on file.    Review of patient's allergies indicates:   Allergen Reactions    Lisinopril        Family History     None          Tobacco Use    Smoking status: Not on file   Substance and Sexual Activity    Alcohol use: Not on file    Drug use: Not on file    Sexual activity: Not on file       Review of Systems   Unable to perform ROS: Mental status change       Objective:     Temp:  [96.5 °F (35.8 °C)-98.3 °F (36.8 °C)] 96.5 °F (35.8 °C)  Pulse:  [46-65] 46  Resp:  [11-28] 11  SpO2:  [86 %-99 %] 99 %  BP: (100-121)/(49-79) 100/49     Body mass index is 29.89 kg/m².           Drains     None                 Physical Exam   Nursing note and vitals reviewed.  Constitutional: He appears well-developed and well-nourished. He appears distressed.   HENT:   Head: Normocephalic and atraumatic.   Eyes: Pupils are equal, round, and reactive to light. Right eye exhibits no discharge. Left eye exhibits no discharge.   Pulmonary/Chest: Effort normal.   Abdominal: Soft. He exhibits no distension. There is no tenderness.   Genitourinary:   Genitourinary Comments: The penis is circumcised with a small eschar on the left dorsal glans. The urethral meatus is normal. Bilateral testes are atrophic. The scrotum is normal in contour. IPP components are palpable in penis and scrotum in their expected position with no overlying fluctuance, induration, or erythema. Cylinders are situated away from eschar. No underlying crepitus. No guarding with exam.   Skin: Skin is warm and dry.     Jaundiced       Significant Labs:    BMP:  Recent Labs   Lab 02/27/20  1013      K 4.5   CL 99   CO2 21*   *   CREATININE 5.4*   CALCIUM 10.0       CBC:  Recent Labs   Lab 02/27/20  1013   WBC 12.80*   HGB 8.0*   HCT 25.2*   PLT 68*       All pertinent labs results from the past 24 hours have been reviewed.    Significant Imaging:  Renal/Bladder US (2/27/20):  - No stones, masses, or  hydronephrosis bilaterally.   - [place] balloon is in bladderbutbladder does not appear completely decompressed.

## 2020-02-27 NOTE — CARE UPDATE
Pt received from rapid response team on a 15 lpm Hi Flow cannula.  Weaned to 8 lpm.  Tolerated well.

## 2020-02-27 NOTE — H&P
[facility]  Critical Care Medicine  History & Physical    Patient Name: Sushil Mcmahon  MRN: [id number]  Admission Date: 2/27/2020  Hospital Length of Stay: 0 days  Code Status: Full Code  Attending Physician: Surinder Mahan MD   Primary Care Provider: Primary Doctor No   Principal Problem: Decompensated HCV cirrhosis    Subjective:     HPI:  Mr. Mcmahon is a 68yo male with cirrhosis/liver failure due to THOMAS, history of hepatic encephalopathy, variceal bleeding, diabetes, hypothyroidism, anxiety/depression, hypotension, A-fib, recent CABG 2/11/2020 who presents as a transfer from [facility] in [place] for liver transplant evaluation. Per notes, patient was previously worked up with liver transplant but was not a candidate due to multiple comorbidities including multi vessel coronary artery disease which required CABG. He was deemed high risk due to multiple comorbidities, also turned down at [facility] where he presented for a second opinion. He eventually went to  were he got a 4- vessel CABG on 2/11. The procedure was complicated by pneumothorax, SALOMON and decompensated liver failure with (MELD 30). He was stabilized and transferred to [facility] for transplant hepatology evaluation, however he was denied candidacy. Patient was then transferred to [facility] 2/27 for higher level of care and liver transplant evaluation. Critical care consulted for hypoxia, encephalopathy and acute renal failure.    On evaluation, patient was encephalopathic and  appeared in distress 2/2 to pain, saturating 86% in 15L n/c. Per wife, patient has been experiencing worse pain in the past few days. The pain is nonspecifically located in his groin/pelvic region. He was also reported to be anuric since arrival at with [facility]. Labs significant for WBC 12.80, HGB 8.0, PLT 6.8, BUN/Cr 162/5.4, lactic acid of 2.5. Patient was given a dose of dilaudid on the  floor, after which he became difficult to arouse, this was subsequently treated with Narcan and patient responded appropriately.     Hospital/ICU Course:  No notes on file     No past medical history on file.    No past surgical history on file.    Review of patient's allergies indicates:   Allergen Reactions    Lisinopril        Family History     None        Tobacco Use    Smoking status: Not on file   Substance and Sexual Activity    Alcohol use: Not on file    Drug use: Not on file    Sexual activity: Not on file      Review of Systems   Unable to perform ROS: Acuity of condition     Objective:     Vital Signs (Most Recent):  Temp: 96.5 °F (35.8 °C) (02/27/20 1543)  Pulse: (!) 47 (02/27/20 1543)  Resp: 16 (02/27/20 1543)  BP: (!) 100/49 (02/27/20 1543)  SpO2: 97 % (02/27/20 1543) Vital Signs (24h Range):  Temp:  [96.5 °F (35.8 °C)-98.3 °F (36.8 °C)] 96.5 °F (35.8 °C)  Pulse:  [47-65] 47  Resp:  [16-28] 16  SpO2:  [86 %-97 %] 97 %  BP: (100-121)/(49-79) 100/49   Weight: 91.8 kg (202 lb 6.1 oz)  Body mass index is 29.89 kg/m².    No intake or output data in the 24 hours ending 02/27/20 1553    Physical Exam   Constitutional: He appears well-developed. He appears distressed.   HENT:   Head: Normocephalic and atraumatic.   Eyes: Scleral icterus is present.   Cardiovascular: Bradycardia present.   Clean dressing located across central chest from recent CABG. No redness or drainage around the area   Pulmonary/Chest: He is in respiratory distress. He has wheezes. He has rales.   Abdominal: Soft. He exhibits no mass. There is tenderness (lower quadrant and groin).   Genitourinary:   Genitourinary Comments: Spain catheter in place   Musculoskeletal: He exhibits no tenderness.   Neurological: He is alert. He is disoriented.   Skin: Skin is warm. No erythema.   Psychiatric: He is agitated.       Vents:     Lines/Drains/Airways     None               Significant Labs:    CBC/Anemia Profile:  Recent Labs   Lab  02/27/20  1013   WBC 12.80*   HGB 8.0*   HCT 25.2*   PLT 68*   *   RDW 22.8*        Chemistries:  Recent Labs   Lab 02/27/20  1013      K 4.5   CL 99   CO2 21*   *   CREATININE 5.4*   CALCIUM 10.0   ALBUMIN 4.0   PROT 6.2   BILITOT 13.5*   ALKPHOS 202*   ALT 26   AST 66*       Bilirubin:   Recent Labs   Lab 02/27/20  1013   BILITOT 13.5*     Blood Culture: No results for input(s): LABBLOO in the last 48 hours.  Coagulation:   Recent Labs   Lab 02/27/20  1013   INR 1.8*     Lactic Acid:   Recent Labs   Lab 02/27/20  1013   LACTATE 2.5*     Urine Studies: No results for input(s): COLORU, APPEARANCEUA, PHUR, SPECGRAV, PROTEINUA, GLUCUA, KETONESU, BILIRUBINUA, OCCULTUA, NITRITE, UROBILINOGEN, LEUKOCYTESUR, RBCUA, WBCUA, BACTERIA, SQUAMEPITHEL, HYALINECASTS in the last 48 hours.    Invalid input(s): WRIGHTSUR    Significant Imaging: Renal Ultrasound: Elevated renal arterial resistive indices which are nonspecific, however can be seen in the setting of medical renal disease.  Partially distended bladder containing a Spain catheter. Abdominopelvic ascites is present.    Ultrasound Liver:  Cirrhotic morphology liver with few indeterminate subcentimeter lesions in the right hepatic lobe.  Hepatocellular carcinoma not excluded.  Recommend further evaluation with contrast-enhanced MRI or CT if patient compatible.  Satisfactory Doppler evaluation of the liver. Sequela of portal hypertension such as ascites and recanalized umbilical vein.  Right-sided pleural effusion.    Assessment/Plan:     Pulmonary  Acute respiratory failure with hypoxia  70 yo male with s/p recent CABAG and decompensated cirrhosis with history of ascites and esophageal varices here for liver transplant evaluation now with acute hypoxia.     Post CABAG complication with pneumothorax and pleural effusion from OSH. Pneumothorax resolved, chest tube removed at OSH.   Thoracentesis at OSH with 600 cc removed    Repeat chest xray with evidence  of pulmonary edema with possible loculation on left and opacity on right. Possible ADHF vs aspiration pneumonia/pneumontitis vs hepatic hydrothorax vs volume over load from ARF    Plan:  2D Echo, BMP  Trial Diuresis with Lasix however has been anuric.   BiPAP for now  Caution with pain meds       Cardiac/Vascular  Coronary artery disease involving coronary bypass graft of native heart with angina pectoris  CABAG in Feb, 2020 with post-op complication of pneumothorax and pleural effusion   Bradycardia on the floor, will get EKG, troponin, 2D echo   Chest xray with pulmonary edema - Lasix trial      Renal/  SALOMON (acute kidney injury)  Decompensated cirrhosis with anuric SALOMON, sCr 5.4, unclear baseline however sCr was 1.0 in January, 2020. Possible HRS  Diuresing for now  Midodrine, Octreotide. Re-assess for Albumin   Renal US with out acute abnormalities   [place], [facility]  Nephrology Consult for possible HD needs     Hematology  Thrombocytopenia  Severe thrombocytopenia 69. Likely secondary to cirrhosis, 120 in Jan, 2020  Hold DVT PPX for now. Unclear if HIT  Daily CBC     GI  * decompensated cirrhosis   68 yo male with decompensated THOMAS cirrhosis here for transplant evaluation     Ascites and esophageal varices - paracentesis and banding recently   Liver US with cirrhosis and hepatic lesions (possible HCC?  Elevated PT/INR with thrombocytopenia, mild elevation of AST/ALT     MELD-Na score: 37 at 2/27/2020  3:55 PM  MELD score: 37 at 2/27/2020  3:55 PM  Calculated from:  Serum Creatinine: 5.4 mg/dL (Rounded to 4 mg/dL) at 2/27/2020 10:13 AM  Serum Sodium: 137 mmol/L at 2/27/2020 10:13 AM  Total Bilirubin: 13.5 mg/dL at 2/27/2020 10:13 AM  INR(ratio): 1.9 at 2/27/2020  3:55 PM  Age: 69 years    Plan:  Lactulose/rifaxamine   Assess for SBP  CT abdomen - pt with severe pain in the suprapubic and penile area - bladder scan unremarkable       Abdominal pain  Severe abdominal pain in the suprapubic and penile region. Unclear why.  Abdo compartment syndrome? Bladder unremarkable on scan  CT abdo WO contrast   Urology consult for penile pain       Critical Care Daily Checklist:    A: Awake: RASS Goal/Actual Goal:    Actual:     B: Spontaneous Breathing Trial Performed?     C: SAT & SBT Coordinated?  n/a                 D: Delirium: CAM-ICU     E: Early Mobility Performed? Yes   F: Feeding Goal:    Status:     Current Diet Order   No orders of the defined types were placed in this encounter.      AS: Analgesia/Sedation n/a   T: Thromboembolic Prophylaxis Heparin subq 5K units TID tomorrow   H: HOB > 300 Yes   U: Stress Ulcer Prophylaxis (if needed) Pantoprazole 40mg IV   G: Glucose Control    B: Bowel Function Stool Occurrence: 0   I: Indwelling Catheter (Lines & Spain) Necessity Spain in place   D: De-escalation of Antimicrobials/Pharmacotherapies N/A    Plan for the day/ETD Admit to MICU    Code Status:  Family/Goals of Care: Full Code         Critical secondary to Patient has a condition that poses threat to life and bodily function: Severe Respiratory Distress and Acute Renal Failure     Critical care was time spent personally by me on the following activities: development of treatment plan with patient or surrogate and bedside caregivers, discussions with consultants, evaluation of patient's response to treatment, examination of patient, ordering and performing treatments and interventions, ordering and review of laboratory studies, ordering and review of radiographic studies, pulse oximetry, re-evaluation of patient's condition. This critical care time did not overlap with that of any other provider or involve time for any procedures.     Nighat Benitez, DO  Critical Care Medicine  [facility]

## 2020-02-27 NOTE — ASSESSMENT & PLAN NOTE
69 y.o. 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 Urology was consulted due to penile pain.    - Recommend felix removal and voiding trial as soon as patient's mental status improves as it is unclear why it was placed in the first place. If patient starts pulling at felix, recommend restraints.  - Difficult to fully assess penile pain as patient is encephalopathic.  - IPP components due not appear to be eroding or infected.  - Felix balloon taken down and readvanced into bladder with return of urine. Reinflated with 10 cc of sterile water.  - Recommend PRN Urojet applied at the penile meatus.  - Follow-up CT abdomen/pelvis  - Trend Cr

## 2020-02-27 NOTE — CONSULTS
Consult received. Patient will be admitted to MICU. Thank you!    Carlos Faye MD  Critical Care Medicine

## 2020-02-27 NOTE — NURSING
Received via stretcher, accompanied by ambulance personnel. Gown saturated with blood from chest incision. Patient moaning, c/o pain to bladder, states he feels like he needs to urinate. [place] catheter in place with 50 cc of tea colored urine noted. NG tube to R nare. O2 in use per nasal cannula at 4 lpm.

## 2020-02-27 NOTE — NURSING
Present felix catheter removed, new 16 Fr catheter inserted. Return of 25cc tea colored urine noted. Patient tolerated procedure without difficulty.

## 2020-02-27 NOTE — ASSESSMENT & PLAN NOTE
Severe abdominal pain in the suprapubic and penile region. Unclear why. Abdo compartment syndrome? Bladder unremarkable on scan  CT abdo WO contrast   Urology consult for penile pain

## 2020-02-27 NOTE — SUBJECTIVE & OBJECTIVE
No past medical history on file.    No past surgical history on file.    Review of patient's allergies indicates:   Allergen Reactions    Lisinopril      Current Facility-Administered Medications   Medication Frequency    0.9%  NaCl infusion (CRRT USE ONLY) Continuous    acetaminophen tablet 650 mg Q6H PRN    albuterol-ipratropium 2.5 mg-0.5 mg/3 mL nebulizer solution 3 mL Q4H WAKE    albuterol-ipratropium 2.5 mg-0.5 mg/3 mL nebulizer solution 3 mL Q6H PRN    atropine 0.1 mg/mL injection     atropine injection 0.5 mg PRN    dextrose 10% (D10W) Bolus PRN    dextrose 10% (D10W) Bolus PRN    furosemide injection 120 mg Once    glucagon (human recombinant) injection 1 mg PRN    glucose chewable tablet 16 g PRN    glucose chewable tablet 24 g PRN    HYDROmorphone injection 0.2 mg Q4H PRN    hyoscyamine 0.125 mg/mL solution 0.125 mg TID    lactulose 20 gram/30 mL solution Soln 30 g TID    lidocaine HCl 2% urojet BID    magnesium sulfate 2g in water 50mL IVPB (premix) PRN    midodrine tablet 10 mg TID    norepinephrine 4 mg in dextrose 5% 250 mL infusion (premix) (titrating) Continuous    norepinephrine bitartrate-D5W 4 mg/250 mL (16 mcg/mL) infusion Soln     octreotide injection 100 mcg Q8H    ondansetron injection 4 mg Q8H PRN    rifAXIMin tablet 550 mg BID    sodium chloride 0.9% flush 10 mL PRN    sodium phosphate 20.01 mmol in dextrose 5 % 250 mL IVPB PRN    sodium phosphate 30 mmol in dextrose 5 % 250 mL IVPB PRN    sodium phosphate 39.99 mmol in dextrose 5 % 250 mL IVPB PRN    traMADol tablet 50 mg Q6H PRN     Family History     None        Tobacco Use    Smoking status: Not on file   Substance and Sexual Activity    Alcohol use: Not on file    Drug use: Not on file    Sexual activity: Not on file     Review of Systems   Unable to perform ROS: Mental status change     Objective:     Vital Signs (Most Recent):  Temp: 96.5 °F (35.8 °C) (02/27/20 1543)  Pulse: (!) 44 (02/27/20  1652)  Resp: 14 (02/27/20 1652)  BP: (!) 91/50 (02/27/20 1652)  SpO2: 97 % (02/27/20 1652)  O2 Device (Oxygen Therapy): High Flow nasal Cannula (02/27/20 1555) Vital Signs (24h Range):  Temp:  [96.5 °F (35.8 °C)-98.3 °F (36.8 °C)] 96.5 °F (35.8 °C)  Pulse:  [44-65] 44  Resp:  [11-28] 14  SpO2:  [86 %-99 %] 97 %  BP: ()/(49-79) 91/50     Weight: 91.8 kg (202 lb 6.1 oz) (02/27/20 1543)  Body mass index is 29.89 kg/m².  Body surface area is 2.11 meters squared.    No intake/output data recorded.    Physical Exam   Constitutional: He appears well-developed and well-nourished. No distress.   HENT:   Head: Normocephalic and atraumatic.   NGT in place   Eyes: Scleral icterus is present.   Cardiovascular: Regular rhythm. Exam reveals no gallop and no friction rub.   No murmur heard.  Bradycardia - 40s  L arm PICC in place   Pulmonary/Chest: Effort normal. No stridor. No respiratory distress. He has no wheezes. He has rales.   On 8 L HF-NC   Abdominal: Soft. He exhibits distension. There is tenderness.   Genitourinary:   Genitourinary Comments: [place] with minimal dark urine in bag - Spain exchanged on arrival to [facility] and only 25 cc UOP since that time   Musculoskeletal: He exhibits edema (BLE - pitting).   Neurological:   Somnolent, arouses to voice   Skin: He is not diaphoretic.   Multiple areas of ecchymosis, most notably to R arm  Grossly jaundiced     Significant Labs:  CBC:   Recent Labs   Lab 02/27/20  1013   WBC 13.99*  12.80*   RBC 2.45*  2.48*   HGB 8.0*  8.0*   HCT 25.4*  25.2*   PLT 71*  68*   *  102*   MCH 32.7*  32.3*   MCHC 31.5*  31.7*     CMP:   Recent Labs   Lab 02/27/20  1013   GLU 94  118*   CALCIUM 10.6*  10.0   ALBUMIN 3.8  4.0   PROT 6.2  6.2     137   K 4.8  4.5   CO2 22*  21*   CL 98  99   *   CREATININE 5.7*  5.4*   ALKPHOS 205*  202*   ALT 28  26   AST 73*  66*   BILITOT 14.2*  13.5*     Significant Imaging:  X-Ray: Reviewed  US: Reviewed

## 2020-02-27 NOTE — SUBJECTIVE & OBJECTIVE
No past medical history on file.    No past surgical history on file.    Review of patient's allergies indicates:   Allergen Reactions    Lisinopril        Family History     None        Tobacco Use    Smoking status: Not on file   Substance and Sexual Activity    Alcohol use: Not on file    Drug use: Not on file    Sexual activity: Not on file      Review of Systems   Unable to perform ROS: Acuity of condition     Objective:     Vital Signs (Most Recent):  Temp: 96.5 °F (35.8 °C) (02/27/20 1543)  Pulse: (!) 47 (02/27/20 1543)  Resp: 16 (02/27/20 1543)  BP: (!) 100/49 (02/27/20 1543)  SpO2: 97 % (02/27/20 1543) Vital Signs (24h Range):  Temp:  [96.5 °F (35.8 °C)-98.3 °F (36.8 °C)] 96.5 °F (35.8 °C)  Pulse:  [47-65] 47  Resp:  [16-28] 16  SpO2:  [86 %-97 %] 97 %  BP: (100-121)/(49-79) 100/49   Weight: 91.8 kg (202 lb 6.1 oz)  Body mass index is 29.89 kg/m².    No intake or output data in the 24 hours ending 02/27/20 1553    Physical Exam   Constitutional: He appears well-developed. He appears distressed.   HENT:   Head: Normocephalic and atraumatic.   Eyes: Scleral icterus is present.   Cardiovascular: Bradycardia present.   Clean dressing located across central chest from  Recent cabbage. No redness or drainage around the area   Pulmonary/Chest: He is in respiratory distress. He has wheezes. He has rales.   Abdominal: Soft. He exhibits no mass. There is tenderness (lower quadrant and groin).   Genitourinary:   Genitourinary Comments: [place] catheter in place   Musculoskeletal: He exhibits no tenderness.   Neurological: He is alert. He is disoriented.   Skin: Skin is warm. No erythema.   Psychiatric: He is agitated.       Vents:     Lines/Drains/Airways     None               Significant Labs:    CBC/Anemia Profile:  Recent Labs   Lab 02/27/20  1013   WBC 12.80*   HGB 8.0*   HCT 25.2*   PLT 68*   *   RDW 22.8*        Chemistries:  Recent Labs   Lab 02/27/20  1013      K 4.5   CL 99   CO2 21*   BUN  162*   CREATININE 5.4*   CALCIUM 10.0   ALBUMIN 4.0   PROT 6.2   BILITOT 13.5*   ALKPHOS 202*   ALT 26   AST 66*       Bilirubin:   Recent Labs   Lab 02/27/20  1013   BILITOT 13.5*     Blood Culture: No results for input(s): LABBLOO in the last 48 hours.  Coagulation:   Recent Labs   Lab 02/27/20  1013   INR 1.8*     Lactic Acid:   Recent Labs   Lab 02/27/20  1013   LACTATE 2.5*     Urine Studies: No results for input(s): COLORU, APPEARANCEUA, PHUR, SPECGRAV, PROTEINUA, GLUCUA, KETONESU, BILIRUBINUA, OCCULTUA, NITRITE, UROBILINOGEN, LEUKOCYTESUR, RBCUA, WBCUA, BACTERIA, SQUAMEPITHEL, HYALINECASTS in the last 48 hours.    Invalid input(s): WRIGHTSUR    Significant Imaging: Renal Ultrasound: Elevated renal arterial resistive indices which are nonspecific, however can be seen in the setting of medical renal disease.  Partially distended bladder containing a [place] catheter. Abdominopelvic ascites is present.    Ultrasound Liver:  Cirrhotic morphology liver with few indeterminate subcentimeter lesions in the right hepatic lobe.  Hepatocellular carcinoma not excluded.  Recommend further evaluation with contrast-enhanced MRI or CT if patient compatible.  Satisfactory Doppler evaluation of the liver. Sequela of portal hypertension such as ascites and recanalized umbilical vein.  Right-sided pleural effusion.

## 2020-02-27 NOTE — TELEPHONE ENCOUNTER
"Patient Call back:     SW returned pt's wife call, BRADFORD informed pt's wife that SW needs to meet with his caregiver tomorrow for an inpatient evaluation and informed caregiver social work evaluations are part of the transplant process. Pt's caregiver stated will be with the patient in pt's room tomorrow for the inpatient eval. Pt's wife informed BRADFORD "he is in room 6078" and stated "I am available" to meet with SW. SW is following and available.     Pt's Wife(Ellie Mcmahon) : [phone]  "

## 2020-02-27 NOTE — ASSESSMENT & PLAN NOTE
Decompensated cirrhosis with anuric SALOMON, sCr 5.4, unclear baseline however sCr was 1.0 in January, 2020. Possible HRS  Diuresing for now  Midodrine, Octreotide. Re-assess for Albumin   Renal US with out acute abnormalities   [place] [facility]  [place]rology Consult for possible HD needs

## 2020-02-27 NOTE — ASSESSMENT & PLAN NOTE
ALMA in Feb, 2020 with post-op complication of pneumothorax and pleural effusion   Bradycardia on the floor, will get EKG, troponin, 2D echo   Chest xray with pulmonary edema - Lasix trial

## 2020-02-27 NOTE — HPI
Mr. Mcmahon is a 70yo male with cirrhosis/liver failure due to THOMAS, history of hepatic encephalopathy, variceal bleeding, diabetes, hypothyroidism, anxiety/depression, hypotension, A-fib, recent CABG 2/11/2020 who presents as a transfer from [facility] in [place] for liver transplant evaluation. Per notes, patient was previously worked up with liver transplant but was not a candidate due to multiple comorbidities including multi vessel coronary artery disease which required CABG. He was deemed high risk due to multiple comorbidities, also turned down at [facility] where he presented for a second opinion. He eventually went to [facility] were he got a 4- vessel CABG on 2/11. The procedure was complicated by pneumothorax, SALOMON and decompensated liver failure with (MELD 30). He was stabilized and transferred to [facility] for transplant hepatology evaluation, however he was denied candidacy. Patient was then transferred to [facility] 2/27 for higher level of care and liver transplant evaluation. Critical care consulted for hypoxia, encephalopathy and acute renal failure.    On evaluation, patient was encephalopathic and  appeared in distress 2/2 to pain, saturating 86% in 15L n/c. Per wife, patient has been experiencing worse pain in the past few days. The pain is nonspecifically located in his groin/pelvic region. He was also reported to be anuric since arrival at with [facility]. Labs significant for WBC 12.80, HGB 8.0, PLT 6.8, BUN/Cr 162/5.4, lactic acid of 2.5. Patient was given a dose of dilaudid on the floor, after which he became difficult to arouse, this was subsequently treated with Narcan and patient responded appropriately.

## 2020-02-27 NOTE — CONSULTS
[facility]  Urology  Consult Note    Patient Name: Sushil Mcmahon  MRN: [id number]  Admission Date: 2/27/2020  Hospital Length of Stay: 0   Code Status: Full Code   Attending Provider: Surinder Mahan MD   Consulting Provider: Richie Butt MD  Primary Care Physician: Primary Doctor No  Principal Problem:Decompensated HCV cirrhosis    Inpatient consult to Urology  Consult performed by: Richie Butt MD  Consult ordered by: Carlos Faye MD          Subjective:     HPI:  69 y.o. 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 History obtained from chart review and wife at the bedside as patient is encephalopathic. Patient was previously worked up with liver transplant and was not a candidate due to multiple comorbidities including multi vessel coronary artery disease which required CABG. The patient eventually underwent CABGx4 at [facility] in [place] on 2/11 which was complicated by pneumothorax, SALOMON (thought to be due to HRS and dehydration), and decompensated liver failure. Urology was consulted due to severe penile pain which has steadily worsened over the past few days. Patient has an indwelling felix which was replaced at [facility] this AM. Per wife, she is not sure why he has a felix and it has been in place since his CABG. He had a three-piece IPP placed about three years ago. No other urologic surgeries. No gross hematuria. Renal US from 2/27 showed no stones, masses, or hydronephrosis bilaterally. Felix balloon is in bladder but bladder does not appear completely decompressed. WBC is 13.99 and Cr is 5.4.    No past medical history on file.    No past surgical history on file.    Review of patient's allergies indicates:   Allergen Reactions    Lisinopril        Family History     None          Tobacco Use    Smoking status: Not on file    Substance and Sexual Activity    Alcohol use: Not on file    Drug use: Not on file    Sexual activity: Not on file       Review of Systems   Unable to perform ROS: Mental status change       Objective:     Temp:  [96.5 °F (35.8 °C)-98.3 °F (36.8 °C)] 96.5 °F (35.8 °C)  Pulse:  [46-65] 46  Resp:  [11-28] 11  SpO2:  [86 %-99 %] 99 %  BP: (100-121)/(49-79) 100/49     Body mass index is 29.89 kg/m².           Drains     None                 Physical Exam   Nursing note and vitals reviewed.  Constitutional: He appears well-developed and well-nourished. He appears distressed.   HENT:   Head: Normocephalic and atraumatic.   Eyes: Pupils are equal, round, and reactive to light. Right eye exhibits no discharge. Left eye exhibits no discharge.   Pulmonary/Chest: Effort normal.   Abdominal: Soft. He exhibits no distension. There is no tenderness.   Genitourinary:   Genitourinary Comments: The penis is circumcised with a small eschar on the left dorsal glans. The urethral meatus is normal. Bilateral testes are atrophic. The scrotum is normal in contour. IPP components are palpable in penis and scrotum in their expected position with no overlying fluctuance, induration, or erythema. Cylinders are situated away from eschar. No underlying crepitus. No guarding with exam.   Skin: Skin is warm and dry.     Jaundiced       Significant Labs:    BMP:  Recent Labs   Lab 02/27/20  1013      K 4.5   CL 99   CO2 21*   *   CREATININE 5.4*   CALCIUM 10.0       CBC:  Recent Labs   Lab 02/27/20  1013   WBC 12.80*   HGB 8.0*   HCT 25.2*   PLT 68*       All pertinent labs results from the past 24 hours have been reviewed.    Significant Imaging:  Renal/Bladder US (2/27/20):  - No stones, masses, or hydronephrosis bilaterally.   - [place] balloon is in bladder but bladder does not appear completely decompressed.      Assessment and Plan:     Penile pain  69 y.o. 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 Urology was consulted due to penile pain.    - Recommend felix removal and voiding trial as soon as patient's mental status improves as it is unclear why it was placed in the first place. If patient starts pulling at felix, recommend restraints.  - Difficult to fully assess penile pain as patient is encephalopathic.  - IPP components do not appear to be eroding or infected.  - Felix balloon taken down and readvanced into bladder with return of urine. Reinflated with 10 cc of sterile water.  - Recommend PRN Urojet applied at the penile meatus.  - Follow-up CT abdomen/pelvis  - Trend Cr        VTE Risk Mitigation (From admission, onward)         Ordered     IP VTE LOW RISK PATIENT  Once      02/27/20 0927                Thank you for your consult. I will follow-up with patient. Please contact us if you have any additional questions.    Richie Butt MD  Urology  [facility]

## 2020-02-27 NOTE — HPI
This is a 70 y/o male with THOMAS cirrhosis (complicated by varices, ascites requiring weekly paracentesis, hepatic encephalopathy), a-fib, DM, hypothyroidism, CAD s/p recent CABG 2/11/20 who was transferred from [place], [place]) to [facility] for liver TXP evaluation. He had been evaluated for liver TXP in [place] and sought second opinion at [facility], but found to have multivessel CAD in his work-up and initially deemed not amenable for PTCA and too high risk for CABG. Eventually saw Dr. Biggs (CT SURGERY) in [place] and underwent CABG 2/11/20 complicated post-operatively by PTX, decompensated liver failure, SALOMON - he has never left the hospital since that surgery.    Per his wife present at bedside, his mental status has been waxing and waning and he has been very weak since surgery. Currently he is very somnolent and arouses to questions briefly. Given Narcan on the floor after dose of Dilaudid and transferred to ICU this afternoon for worsening respiratory status.    NEPHROLOGY is consulted for SALOMON in setting of decompensated cirrhosis.

## 2020-02-27 NOTE — CONSULTS
[facility]  Nephrology  Consult Note    Patient Name: Sushil Mcmahon  MRN: [id number]  Admission Date: 2/27/2020  Hospital Length of Stay: 0 days  Attending Provider: Surinder Mahan MD   Primary Care Physician: Primary Doctor No  Principal Problem:Decompensated HCV cirrhosis    Inpatient consult to Nephrology  Consult performed by: Tramaine Mills MD  Consult ordered by: Keaton Richter MD        Subjective:     HPI: This is a 70 y/o male with THOMAS cirrhosis (complicated by varices, ascites requiring weekly paracentesis, hepatic encephalopathy), a-fib, DM, hypothyroidism, CAD s/p recent CABG 2/11/20 who was transferred from [facility] to [facility] for liver TXP evaluation. He had been evaluated for liver TXP in [place] and sought second opinion at [facility], but found to have multivessel CAD in his work-up and initially deemed not amenable for PTCA and too high risk for CABG. Eventually saw Dr. Biggs (CT SURGERY) in [place] and underwent CABG 2/11/20 complicated post-operatively by PTX, decompensated liver failure, SALOMON - he has never left the hospital since that surgery.    Per his wife present at bedside, his mental status has been waxing and waning and he has been very weak since surgery. Currently he is very somnolent and arouses to questions briefly. Given Narcan on the floor after dose of Dilaudid and transferred to ICU this afternoon for worsening respiratory status.    NEPHROLOGY is consulted for SALOMON in setting of decompensated cirrhosis.    No past medical history on file.    No past surgical history on file.    Review of patient's allergies indicates:   Allergen Reactions    Lisinopril      Current Facility-Administered Medications   Medication Frequency    0.9%  NaCl infusion (CRRT USE ONLY) Continuous    acetaminophen tablet 650 mg Q6H PRN    albuterol-ipratropium 2.5 mg-0.5 mg/3 mL nebulizer solution 3 mL Q4H WAKE    albuterol-ipratropium 2.5 mg-0.5  mg/3 mL nebulizer solution 3 mL Q6H PRN    atropine 0.1 mg/mL injection     atropine injection 0.5 mg PRN    dextrose 10% (D10W) Bolus PRN    dextrose 10% (D10W) Bolus PRN    furosemide injection 120 mg Once    glucagon (human recombinant) injection 1 mg PRN    glucose chewable tablet 16 g PRN    glucose chewable tablet 24 g PRN    HYDROmorphone injection 0.2 mg Q4H PRN    hyoscyamine 0.125 mg/mL solution 0.125 mg TID    lactulose 20 gram/30 mL solution Soln 30 g TID    lidocaine HCl 2% urojet BID    magnesium sulfate 2g in water 50mL IVPB (premix) PRN    midodrine tablet 10 mg TID    norepinephrine 4 mg in dextrose 5% 250 mL infusion (premix) (titrating) Continuous    norepinephrine bitartrate-D5W 4 mg/250 mL (16 mcg/mL) infusion Soln     octreotide injection 100 mcg Q8H    ondansetron injection 4 mg Q8H PRN    rifAXIMin tablet 550 mg BID    sodium chloride 0.9% flush 10 mL PRN    sodium phosphate 20.01 mmol in dextrose 5 % 250 mL IVPB PRN    sodium phosphate 30 mmol in dextrose 5 % 250 mL IVPB PRN    sodium phosphate 39.99 mmol in dextrose 5 % 250 mL IVPB PRN    traMADol tablet 50 mg Q6H PRN     Family History     None        Tobacco Use    Smoking status: Not on file   Substance and Sexual Activity    Alcohol use: Not on file    Drug use: Not on file    Sexual activity: Not on file     Review of Systems   Unable to perform ROS: Mental status change     Objective:     Vital Signs (Most Recent):  Temp: 96.5 °F (35.8 °C) (02/27/20 1543)  Pulse: (!) 44 (02/27/20 1652)  Resp: 14 (02/27/20 1652)  BP: (!) 91/50 (02/27/20 1652)  SpO2: 97 % (02/27/20 1652)  O2 Device (Oxygen Therapy): High Flow nasal Cannula (02/27/20 1555) Vital Signs (24h Range):  Temp:  [96.5 °F (35.8 °C)-98.3 °F (36.8 °C)] 96.5 °F (35.8 °C)  Pulse:  [44-65] 44  Resp:  [11-28] 14  SpO2:  [86 %-99 %] 97 %  BP: ()/(49-79) 91/50     Weight: 91.8 kg (202 lb 6.1 oz) (02/27/20 1543)  Body mass index is 29.89 kg/m².  Body  surface area is 2.11 meters squared.    No intake/output data recorded.    Physical Exam   Constitutional: He appears well-developed and well-nourished. No distress.   HENT:   Head: Normocephalic and atraumatic.   NGT in place   Eyes: Scleral icterus is present.   Cardiovascular: Regular rhythm. Exam reveals no gallop and no friction rub.   No murmur heard.  Bradycardia - 40s  L arm PICC in place   Pulmonary/Chest: Effort normal. No stridor. No respiratory distress. He has no wheezes. He has rales.   On 8 L HF-NC   Abdominal: Soft. He exhibits distension. There is tenderness.   Genitourinary:   Genitourinary Comments: [place] with minimal dark urine in bag - [place] exchanged on arrival to [facility] and only 25 cc UOP since that time   Musculoskeletal: He exhibits edema (BLE - pitting).   Neurological:   Somnolent, arouses to voice   Skin: He is not diaphoretic.   Multiple areas of ecchymosis, most notably to R arm  Grossly jaundiced     Significant Labs:  CBC:   Recent Labs   Lab 02/27/20  1013   WBC 13.99*  12.80*   RBC 2.45*  2.48*   HGB 8.0*  8.0*   HCT 25.4*  25.2*   PLT 71*  68*   *  102*   MCH 32.7*  32.3*   MCHC 31.5*  31.7*     CMP:   Recent Labs   Lab 02/27/20  1013   GLU 94  118*   CALCIUM 10.6*  10.0   ALBUMIN 3.8  4.0   PROT 6.2  6.2     137   K 4.8  4.5   CO2 22*  21*   CL 98  99   *   CREATININE 5.7*  5.4*   ALKPHOS 205*  202*   ALT 28  26   AST 73*  66*   BILITOT 14.2*  13.5*     Significant Imaging:  X-Ray: Reviewed  US: Reviewed    Assessment/Plan:     SALOMON (acute kidney injury)  This is a 68 y/o male with THOMAS cirrhosis (complicated by varices, ascites requiring weekly paracentesis, hepatic encephalopathy), a-fib, DM, hypothyroidism, CAD s/p recent CABG 2/11/20 who was transferred from [facility] to [facility] for liver TXP evaluation. He had been evaluated for liver TXP in [place] and sought second opinion at [facility], but found to have  multivessel CAD in his work-up and initially deemed not amenable for PTCA and too high risk for CABG. Eventually saw Dr. Biggs (CT SURGERY) in [place] and underwent CABG 2/11/20 complicated post-operatively by PTX, decompensated liver failure, SALOMON - he has never left the hospital since that surgery. Baseline kidney function appears normal based on [facility] labs from Jan 2020 (BUN 16/1.1). On exam, he is bradycardic and somnolent. B rales throughout both lungs and LE edema. /Cr 5.4. Severely oliguric.    MELD-Na score: 37 at 2/27/2020  3:55 PM  MELD score: 37 at 2/27/2020  3:55 PM  Calculated from:  Serum Creatinine: 5.4 mg/dL (Rounded to 4 mg/dL) at 2/27/2020 10:13 AM  Serum Sodium: 137 mmol/L at 2/27/2020 10:13 AM  Total Bilirubin: 13.5 mg/dL at 2/27/2020 10:13 AM  INR(ratio): 1.9 at 2/27/2020  3:55 PM  Age: 69 years    Overall, appears to be in renal failure with oliguria. Unclear etiology - he is s/p CABG 2/11/20 but paperwork from outside hospital not available to us at this time. Possible HRS given decompensated cirrhosis. Also with uremia vs. hepatic encephalopathy (ammonia 57).    RECOMMENDATIONS:  - needs SLED - plan for initiation tonight - discussed with CRITICAL CARE team who will place access  - avoid nephrotoxins  - renally dose medications  - renal diet when eating  - monitor renal function      Thank you for your consult. I will follow-up with patient. Please contact us if you have any additional questions.    Tramaine Mills MD  Nephrology  [facility]

## 2020-02-27 NOTE — PROGRESS NOTES
[facility]  Nephrology  Progress Note    Patient Name: Sushil Mcmahon  MRN: [id number]  Admission Date: 2/27/2020  Hospital Length of Stay: 0 days  Attending Provider: Surinder Mahan MD   Primary Care Physician: Primary Doctor No  Principal Problem:Decompensated HCV cirrhosis    Subjective:     HPI: This is a 68 y/o male with THOMAS cirrhosis (complicated by varices, ascites requiring weekly paracentesis, hepatic encephalopathy), a-fib, DM, hypothyroidism, CAD s/p recent CABG 2/11/20 who was transferred from [facility] to [facility] for liver TXP evaluation. He had been evaluated for liver TXP in [place] and sought second opinion at [facility], but found to have multivessel CAD in his work-up and initially deemed not amenable for PTCA and too high risk for CABG. Eventually saw Dr. Biggs (CT SURGERY) in [place] and underwent CABG 2/11/20 complicated post-operatively by PTX, decompensated liver failure, SALOMON - he has never left the hospital since that surgery.    Per his wife present at bedside, his mental status has been waxing and waning and he has been very weak since surgery. Currently he is very somnolent and arouses to questions briefly. Given Narcan on the floor after dose of Dilaudid and transferred to ICU this afternoon for worsening respiratory status.    NEPHROLOGY is consulted for SALOMON in setting of decompensated cirrhosis.    No past medical history on file.    No past surgical history on file.    Review of patient's allergies indicates:   Allergen Reactions    Lisinopril      Current Facility-Administered Medications   Medication Frequency    0.9%  NaCl infusion (CRRT USE ONLY) Continuous    acetaminophen tablet 650 mg Q6H PRN    albuterol-ipratropium 2.5 mg-0.5 mg/3 mL nebulizer solution 3 mL Q4H WAKE    albuterol-ipratropium 2.5 mg-0.5 mg/3 mL nebulizer solution 3 mL Q6H PRN    atropine 0.1 mg/mL injection     atropine injection 0.5 mg PRN     dextrose 10% (D10W) Bolus PRN    dextrose 10% (D10W) Bolus PRN    furosemide injection 120 mg Once    glucagon (human recombinant) injection 1 mg PRN    glucose chewable tablet 16 g PRN    glucose chewable tablet 24 g PRN    HYDROmorphone injection 0.2 mg Q4H PRN    hyoscyamine 0.125 mg/mL solution 0.125 mg TID    lactulose 20 gram/30 mL solution Soln 30 g TID    lidocaine HCl 2% urojet BID    magnesium sulfate 2g in water 50mL IVPB (premix) PRN    midodrine tablet 10 mg TID    norepinephrine 4 mg in dextrose 5% 250 mL infusion (premix) (titrating) Continuous    norepinephrine bitartrate-D5W 4 mg/250 mL (16 mcg/mL) infusion Soln     octreotide injection 100 mcg Q8H    ondansetron injection 4 mg Q8H PRN    rifAXIMin tablet 550 mg BID    sodium chloride 0.9% flush 10 mL PRN    sodium phosphate 20.01 mmol in dextrose 5 % 250 mL IVPB PRN    sodium phosphate 30 mmol in dextrose 5 % 250 mL IVPB PRN    sodium phosphate 39.99 mmol in dextrose 5 % 250 mL IVPB PRN    traMADol tablet 50 mg Q6H PRN     Family History     None        Tobacco Use    Smoking status: Not on file   Substance and Sexual Activity    Alcohol use: Not on file    Drug use: Not on file    Sexual activity: Not on file     Review of Systems   Unable to perform ROS: Mental status change     Objective:     Vital Signs (Most Recent):  Temp: 96.5 °F (35.8 °C) (02/27/20 1543)  Pulse: (!) 44 (02/27/20 1652)  Resp: 14 (02/27/20 1652)  BP: (!) 91/50 (02/27/20 1652)  SpO2: 97 % (02/27/20 1652)  O2 Device (Oxygen Therapy): High Flow nasal Cannula (02/27/20 1555) Vital Signs (24h Range):  Temp:  [96.5 °F (35.8 °C)-98.3 °F (36.8 °C)] 96.5 °F (35.8 °C)  Pulse:  [44-65] 44  Resp:  [11-28] 14  SpO2:  [86 %-99 %] 97 %  BP: ()/(49-79) 91/50     Weight: 91.8 kg (202 lb 6.1 oz) (02/27/20 1543)  Body mass index is 29.89 kg/m².  Body surface area is 2.11 meters squared.    No intake/output data recorded.    Physical Exam   Constitutional: He  appears well-developed and well-nourished. No distress.   HENT:   Head: Normocephalic and atraumatic.   NGT in place   Eyes: Scleral icterus is present.   Cardiovascular: Regular rhythm. Exam reveals no gallop and no friction rub.   No murmur heard.  Bradycardia - 40s  L arm PICC in place   Pulmonary/Chest: Effort normal. No stridor. No respiratory distress. He has no wheezes. He has rales.   On 8 L HF-NC   Abdominal: Soft. He exhibits distension. There is tenderness.   Genitourinary:   Genitourinary Comments: [place] with minimal dark urine in bag - [place] exchanged on arrival to [facility] and only 25 cc UOP since that time   Musculoskeletal: He exhibits edema (BLE - pitting).   Neurological:   Somnolent, arouses to voice   Skin: He is not diaphoretic.   Multiple areas of ecchymosis, most notably to R arm  Grossly jaundiced     Significant Labs:  CBC:   Recent Labs   Lab 02/27/20  1013   WBC 13.99*  12.80*   RBC 2.45*  2.48*   HGB 8.0*  8.0*   HCT 25.4*  25.2*   PLT 71*  68*   *  102*   MCH 32.7*  32.3*   MCHC 31.5*  31.7*     CMP:   Recent Labs   Lab 02/27/20  1013   GLU 94  118*   CALCIUM 10.6*  10.0   ALBUMIN 3.8  4.0   PROT 6.2  6.2     137   K 4.8  4.5   CO2 22*  21*   CL 98  99   *   CREATININE 5.7*  5.4*   ALKPHOS 205*  202*   ALT 28  26   AST 73*  66*   BILITOT 14.2*  13.5*     Significant Imaging:  X-Ray: Reviewed  US: Reviewed    Assessment/Plan:     SALOMON (acute kidney injury)  This is a 70 y/o male with THOMAS cirrhosis (complicated by varices, ascites requiring weekly paracentesis, hepatic encephalopathy), a-fib, DM, hypothyroidism, CAD s/p recent CABG 2/11/20 who was transferred from [facility] to [facility] for liver TXP evaluation. He had been evaluated for liver TXP in [place] and sought second opinion at [facility], but found to have multivessel CAD in his work-up and initially deemed not amenable for PTCA and too high risk for CABG.  Eventually saw Dr. Biggs (CT SURGERY) in [place] and underwent CABG 2/11/20 complicated post-operatively by PTX, decompensated liver failure, SALOMON - he has never left the hospital since that surgery. Baseline kidney function appears normal based on [facility] labs from Jan 2020 (BUN 16/1.1). On exam, he is bradycardic and somnolent. B rales throughout both lungs and LE edema. /Cr 5.4. Severely oliguric.    MELD-Na score: 37 at 2/27/2020  3:55 PM  MELD score: 37 at 2/27/2020  3:55 PM  Calculated from:  Serum Creatinine: 5.4 mg/dL (Rounded to 4 mg/dL) at 2/27/2020 10:13 AM  Serum Sodium: 137 mmol/L at 2/27/2020 10:13 AM  Total Bilirubin: 13.5 mg/dL at 2/27/2020 10:13 AM  INR(ratio): 1.9 at 2/27/2020  3:55 PM  Age: 69 years    Overall, appears to be in renal failure with oliguria. Unclear etiology - he is s/p CABG 2/11/20 but paperwork from outside hospital not available to us at this time. Possible HRS given decompensated cirrhosis. Also with uremia vs. hepatic encephalopathy (ammonia 57).    RECOMMENDATIONS:  - needs SLED - plan for initiation tonight - discussed with CRITICAL CARE team who will place access  - avoid nephrotoxins  - renally dose medications  - renal diet when eating  - monitor renal function      Thank you for your consult. I will follow-up with patient. Please contact us if you have any additional questions.    Tramaine Mills MD  Nephrology  [facility]

## 2020-02-27 NOTE — PROGRESS NOTES
Liver Transplant Social Work Consult:   [facility] notified that patient in evaluation for liver transplant.  SW unable to reach pt at bedside and unable to reach pts wife Ellie Mcmahon, phone [phone], to make arrangements to meet tomorrow to complete psychosocial assessment as part of transplant evaluation.    SW left voice mail for return phone call.  [facility] remains available for transplant resources, education and psychosocial support.

## 2020-02-27 NOTE — ASSESSMENT & PLAN NOTE
68 yo male with decompensated THOMAS cirrhosis here for transplant evaluation     Ascites and esophageal varices - paracentesis and banding recently   Liver US with cirrhosis and hepatic lesions (possible HCC?  Elevated PT/INR with thrombocytopenia, mild elevation of AST/ALT     MELD-Na score: 37 at 2/27/2020  3:55 PM  MELD score: 37 at 2/27/2020  3:55 PM  Calculated from:  Serum Creatinine: 5.4 mg/dL (Rounded to 4 mg/dL) at 2/27/2020 10:13 AM  Serum Sodium: 137 mmol/L at 2/27/2020 10:13 AM  Total Bilirubin: 13.5 mg/dL at 2/27/2020 10:13 AM  INR(ratio): 1.9 at 2/27/2020  3:55 PM  Age: 69 years    Plan:  Lactulose/rifaxamine   Assess for SBP  CT abdomen - pt with severe pain in the suprapubic and penile area - bladder scan unremarkable

## 2020-02-27 NOTE — NURSING
To US via stretcher, accompanied by transporter. Patient appears asleep, arouses to voice and moans. O2 sat 92%, heart rate 64.

## 2020-02-27 NOTE — HPI
69 y.o. 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 History obtained from chart review and wife at the bedside as patient is encephalopathic. Patient was previously worked up with liver transplant and was not a candidate due to multiple comorbidities including multi vessel coronary artery disease which required CABG. The patient eventually underwent CABGx4 at [facility] in [place] on 2/11 which was complicated by pneumothorax, SALOMON (thought to be due to HRS and dehydration), and decompensated liver failure. Urology was consulted due to severe penile pain which has steadily worsened over the past few days. Patient has an indwelling felix which was replaced at [facility] this AM. Per wife, she is not sure why he has a felix and it has been in place since his CABG. He had a three-piece IPP placed about three years ago. No other urologic surgeries. No gross hematuria. Renal US from 2/27 showed no stones, masses, or hydronephrosis bilaterally. Felix balloon is in bladder but bladder does not appear completely decompressed. WBC is 13.99 and Cr is 5.4.

## 2020-02-27 NOTE — ASSESSMENT & PLAN NOTE
70 yo male with s/p recent CABAG and decompensated cirrhosis with history of ascites and esophageal varices here for liver transplant evaluation now with acute hypoxia.     Post CABAG complication with pneumothorax and pleural effusion from OSH. Pneumothorax resolved, chest tube removed at OSH.   Thoracentesis at OSH with 600 cc removed    Repeat chest xray with evidence of pulmonary edema with possible loculation on left and opacity on right. Possible ADHF vs aspiration pneumonia/pneumontitis vs hepatic hydrothorax vs volume over load from ARF    Plan:  2D Echo, BMP  Trial Diuresis with Lasix however has been anuric.   BiPAP for now  Caution with pain meds

## 2020-02-27 NOTE — NURSING
Bladder scan performed for patient's continued c/o bladder pain, no urine output since arrival. Bladder scan yielded 0 ml.

## 2020-02-27 NOTE — ASSESSMENT & PLAN NOTE
This is a 68 y/o male with THOMAS cirrhosis (complicated by varices, ascites requiring weekly paracentesis, hepatic encephalopathy), a-fib, DM, hypothyroidism, CAD s/p recent CABG 2/11/20 who was [place] from [place] [facility] [place]. He had been evaluated for liver TXP in [place] and sought second opinion at [facility], but found to have multivessel CAD in his work-up and initially deemed not amenable for PTCA and too high risk for CABG. Eventually saw Dr. Biggs (CT SURGERY) in [place] and underwent CABG 2/11/20 complicated post-operatively by PTX, decompensated liver failure, SALOMON - he has never left the hospital since that surgery. Baseline kidney function appears normal based on [facility] labs from Jan 2020 (BUN 16/1.1). On exam, he is bradycardic and somnolent. B rales throughout both lungs and LE edema. /Cr 5.4. Severely oliguric.    MELD-Na score: 37 at 2/27/2020  3:55 PM  MELD score: 37 at 2/27/2020  3:55 PM  Calculated from:  Serum Creatinine: 5.4 mg/dL (Rounded to 4 mg/dL) at 2/27/2020 10:13 AM  Serum Sodium: 137 mmol/L at 2/27/2020 10:13 AM  Total Bilirubin: 13.5 mg/dL at 2/27/2020 10:13 AM  INR(ratio): 1.9 at 2/27/2020  3:55 PM  Age: 69 years    Overall, appears to be in renal failure with oliguria. Unclear etiology - he is s/p CABG 2/11/20 but paperwork from outside hospital not available to us at this time. Possible HRS given decompensated cirrhosis. Also with uremia vs. hepatic encephalopathy (ammonia 57).    RECOMMENDATIONS:  - needs SLED - plan for initiation tonight - discussed with CRITICAL CARE team who will place access  - avoid nephrotoxins  - renally dose medications  - renal diet when eating  - monitor renal function

## 2020-02-27 NOTE — RESPIRATORY THERAPY
Rapid Response Respiratory Therapy Proactive Rounding Note      Time of visit: 1435    Code Status: Full Code   : 1950  Bed: 8089/8089 A:   MRN: [id number]    SITUATION     Evaluated patient for: called to bedside by rapid RN [place]ton stating the patient needed a stat ABG for increase WOB and desaturation.    BACKGROUND     Patient has no past medical history on file.    ASSESSMENT/INTERVENTIONS     Upon arrival in room patient was on 4L nasal cannula sating 86%. Placed patient on NRB as patient stated he felt like he couldn't breathe. Patient has fluid overload it seems like. ABG obatined with results below. Placed patient on high flow nasal cannula of 12L to help with desaturations and SOB.     Pulse: 51 Respiratory rate: 28 Temperature: Temp: 96.6 °F (35.9 °C) BP: BP: 121/79 SpO2: 86%  Level of Consciousness: Level of Consciousness (AVPU): alert  Respiratory Effort: Respiratory Effort: Moderate, Labored Expansion/Accessory Muscle Usage: Expansion/Accessory Muscles/Retractions: no retractions  All Lung Field Breath Sounds: All Lung Fields Breath Sounds: Anterior:, Lateral:, wheezes, expiratory  MUSA Breath Sounds: Anterior:, Lateral:, diminished  LLL Breath Sounds: Anterior:, Lateral:, diminished  RUL Breath Sounds: Anterior:, Lateral:, clear  RML Breath Sounds: Anterior:, Lateral:, wheezes, expiratory  RLL Breath Sounds: Anterior:, Lateral:, wheezes, expiratory  O2 Device/Concentration: Nasal cannula/ 4L  Most recent blood gas:   Recent Labs     20  1443   PH 7.313*   PCO2 42.3   PO2 59*   HCO3 21.4*   POCSATURATED 88*   BE -5     NIPPV: No Surgical airway: No  ETCO2 monitored:    Ambu at bedside: Ambu bag with the patient?: Yes, Adult Ambu    Current Respiratory Care Orders:   20 1600  Inhalation Treatment Q4H WAKE Every 4 hours while awake (2 of 14 released)    Release    20 1217   20 1511  Oxygen Continuous Continuous     References: Oxygen Titration Protocol   Question  Answer Comment   Device type: High flow    Device: High Flow Nasal Cannula (6 -15 Liters)    LPM: 6-15    Titrate O2 per Oxygen Titration Protocol: Yes    To maintain SpO2 goal of: >= 90%    Notify MD of: Inability to achieve desired SpO2; Sudden change in patient status and requires 20% increase in FiO2; Patient requires >60% FiO2        02/27/20 02/27/20 1230  albuterol-ipratropium 2.5 mg-0.5 mg/3 mL nebulizer solution 3 mL (albuterol-ipratropium (DUO-NEB) 0.5 - 3 mg (2.5 mg base)/ 3 mL nebulizer panel)    Discontinue    -- Dispensed NEBULIZATION Every 4 hours while awake 02/27/20 1217         RECOMMENDATIONS     We recommend: patient is being transferred to CMICU and to be placed on bipap in ICU.    ESCALATION      Physician Escalation (Yes/No)YES  Care discussed with: Dr. Mahan  Discussed plan of care primary RT, Rosemarie and Shon SANDRA RRT    FOLLOW-UP     Please call back the Rapid Response RT, Amisha Quigley, RRT at x [phone] for any questions or concerns.

## 2020-02-28 PROBLEM — E43 SEVERE MALNUTRITION: Status: ACTIVE | Noted: 2020-01-01

## 2020-02-28 PROBLEM — R23.9 ALTERATION IN SKIN INTEGRITY IN ADULT: Status: ACTIVE | Noted: 2020-01-01

## 2020-02-28 PROBLEM — R65.21 SEPTIC SHOCK: Status: ACTIVE | Noted: 2020-01-01

## 2020-02-28 PROBLEM — A41.9 SEPTIC SHOCK: Status: ACTIVE | Noted: 2020-01-01

## 2020-02-28 NOTE — ASSESSMENT & PLAN NOTE
Unknown etiology at this time, suspect SBP vs PNA. Currently requiring vasopressor support.  Lactic acid 3.5. WBC 16.5  Blood cultures NGTD  Started on Vancomycin and Zosyn

## 2020-02-28 NOTE — PLAN OF CARE
Recommendations     1. If/when medically feasible, initiate enteral nutrition via NGT. Rec'd Novasource @ 45 mL/hr to provide 2160 kcals, 98 g of protein, 774 mL fluid.  2. If able to advance diet, recommend Renal diet (texture per SLP).   3. RD to monitor & follow-up.     Goals: Meet % EEN, EPN by RD f/u date  Nutrition Goal Status: new  Communication of RD Recs: reviewed with RN

## 2020-02-28 NOTE — PLAN OF CARE
CMICU DAILY GOALS       A: Awake    RASS: Goal - RASS Goal: 0-->alert and calm  Actual - RASS (Salugero Agitation-Sedation Scale): -2-->light sedation   Restraint necessity:    B: Breath   SBT: NA   C: Coordinate A & B, analgesics/sedatives   Pain: managed    SAT: NA  D: Delirium   CAM-ICU: Overall CAM-ICU: Positive  E: Early Mobility   MOVE Screen: Pass   Activity: Activity Management: activity adjusted per tolerance, activity clustered for rest period  FAS: Feeding/Nutrition   Diet order:  ,   Fluid restriction:    T: Thrombus   DVT prophylaxis: VTE Required Core Measure: Pharmacological prophylaxis initiated/maintained  H: HOB Elevation   Head of Bed (HOB): HOB at 30-45 degrees  U: Ulcer Prophylaxis   GI: yes  G: Glucose control   managed    S: Skin   Bundle compliance: yes   Bathing/Skin Care: bath, partial, incontinence care, linen changed Date: 2/27/2020  B: Bowel Function   no issues   I: Indwelling Catheters   [place] necessity:      Urethral Catheter 02/27/20 1300-Reason for Continuing Urinary Catheterization: Critically ill in ICU requiring intensive monitoring   CVC necessity: Yes   IPAD offered: No  D: De-escalation Antibx   No  Plan for the day   Plan line for SLED. Monitor neuro status.   Family/Goals of care/Code Status   Code Status: Full Code     No acute events throughout day, VS and assessment per flow sheet, patient progressing towards goals as tolerated, plan of care reviewed with Sushil Mcmahon and family, all concerns addressed, will continue to monitor.

## 2020-02-28 NOTE — ASSESSMENT & PLAN NOTE
Decompensated cirrhosis with anuric SALOMON, sCr 5.4, unclear baseline however sCr was 1.0 in January, 2020. Possible HRS vs ATN  Renal US without acute abnormalities   Nephrology Consult for possible HD needs   Continue SLED  BUN improved from 173 to 89

## 2020-02-28 NOTE — PT/OT/SLP EVAL
Occupational Therapy  Co- Evaluation    Name: Sushil Mcmahon  MRN: [id number]  Admitting Diagnosis:  Decompensated HCV cirrhosis     Pt with THOMAS cirrhosis with hepatic encephalopathy. Pt underwent CABG 2/11/2020 which was complicated by pneumothorax, SALOMON and decompensated liver failure. Pt was t/f to [facility] from [place] for liver w/u. Pt was then t/f to ICU 2/27/20 with increased lethargy and temp.       Recommendations:     Discharge Recommendations:    Potentially rehab pending pt's progress and tolerance for therapy     Assessment:     Sushil Mcmahon is a 69 y.o. male with a medical diagnosis of Decompensated HCV cirrhosis.  . Performance deficits affecting function: weakness, gait instability, impaired balance, impaired endurance, impaired self care skills, impaired functional mobilty, impaired cognition, pain, decreased upper extremity function, decreased lower extremity function.    Pt tolerated session fair. Pt groaning and moaning during session which did decrease with EOB sitting. Pt is not safe for d/c home and may benefit from post acute therapy services.     Rehab Prognosis: Fair; patient would benefit from acute skilled OT services to address these deficits and reach maximum level of function.       Plan:     Patient to be seen 3 x/week to address the above listed problems via self-care/home management, therapeutic activities, therapeutic exercises  · Plan of Care Expires:    · Plan of Care Reviewed with: patient, spouse    Subjective     Pt able to state name and birthday.  Pt reports pain but unable to rate pain.     Occupational Profile:  Pt resides with spouse in one story home with 2 ALO. Pt was independent PTA but did use rollator for long community distance. Pt has rollator, shower chair and w/c.  Spouse reports pt has physically disabled son at home.     Pain/Comfort:  · Pain Rating 1: (pain in penis and abdomen. Pt unable to rate pain. )  · Pain Addressed 1: Reposition, Distraction, Nurse notified,  Pre-medicate for activity    Patients cultural, spiritual, Adventism conflicts given the current situation: no    Objective:     Communicated with: nsg prior to session. Pt found in bed with spouse at bedside. HR 54 BPM which remained stable during session.     General Precautions: Standard,   fall, Sternal    Occupational Performance:    Bed Mobility:    Supine<>sit with TOTAL A x 2      Activities of Daily Living:  · Pt requiring TOTAL A for all ADL skills.     Cognitive/Visual Perceptual  Pt awake but lethargic and restless. Pt moaning and groaning during session. Pt not following commands and eyes mostly closed during session.     Physical Exam:  Pt with sternal incision and UE strength testing within sternal precautions; however, pt demo no AROM this date with MMT . Pt seen spontaneously moving B UEs demo grossly 2+/5 strength.     AMPAC 6 Click ADL:  AMPAC Total Score: 6    Treatment & Education:  Pt tolerated sitting EOB approx 10 min with TOTAL A for balance. Education provided re: current sternal precautions but pt without evidence of understanding. Pt did have decrease in restlessness and moaning while sitting EOB. Unable to engage with self care skills EOB due to impaired balance and attention to task.   Education provided re: OT POC and safety with functional mobility/ADl skills.   Education:    Patient left supine with all lines intact, call button in reach, nsg  notified and spouse present    GOALS:   Multidisciplinary Problems     Occupational Therapy Goals        Problem: Occupational Therapy Goal    Goal Priority Disciplines Outcome Interventions   Occupational Therapy Goal     OT, PT/OT Ongoing, Progressing    Description:  Goals to be met by: 7 days 3/6/2020     Patient will increase functional independence with ADLs by performing:    Pt to complete UE dressing with MIN A   Pt to complete g/h skills seated with set-up  Pt to t/f to commode with MIN A   Pt to complete toileting with MOD A   Pt to  tolerated OOB to chair x 2-3 hours daily to increase endurance for activity.                         History:     No past medical history on file.    No past surgical history on file.    Time Tracking:     OT Date of Treatment: 02/28/20  OT Start Time: 0852  OT Stop Time: 0915  OT Total Time (min): 23 min    Billable Minutes:Evaluation 8  Therapeutic Activity 15    LOY Wood  2/28/2020

## 2020-02-28 NOTE — PLAN OF CARE
CMICU DAILY GOALS       A: Awake    RASS: Goal - RASS Goal: 0-->alert and calm  Actual - RASS (Salguero Agitation-Sedation Scale): -2-->light sedation   Restraint necessity:  no  B: Breath   SBT: Not intubated   C: Coordinate A & B, analgesics/sedatives   Pain: managed    SAT: Not intubated  D: Delirium   CAM-ICU: Overall CAM-ICU: Positive  E: Early Mobility   MOVE Screen: Fail   Activity: Activity Management: activity adjusted per tolerance  FAS: Feeding/Nutrition   Diet order: Diet/Nutrition Received: NPO,   Fluid restriction:    T: Thrombus   DVT prophylaxis: VTE Required Core Measure: Pharmacological prophylaxis initiated/maintained  H: HOB Elevation   Head of Bed (HOB): HOB at 30-45 degrees  U: Ulcer Prophylaxis   GI: yes  G: Glucose control   managed    S: Skin   Bundle compliance: yes   Bathing/Skin Care: bath, partial, incontinence care, linen changed Date: day bath   B: Bowel Function   constipation  I: Indwelling Catheters   [place] necessity:      Urethral Catheter 02/27/20 1300-Reason for Continuing Urinary Catheterization: Critically ill in ICU requiring intensive monitoring   CVC necessity: Yes   IPAD offered: No  D: De-escalation Antibx   no  Plan for the day   Continue with plan of care.   Family/Goals of care/Code Status   Code Status: Full Code     Pt lethargic and has had a decline in status. Team notified. See attached note.  VS and assessment per flow sheet, patient progressing towards goals as tolerated, plan of care reviewed with Sushil Mcmahon and family, all concerns addressed, will continue to monitor.

## 2020-02-28 NOTE — SUBJECTIVE & OBJECTIVE
Interval History:  Critically ill - on Levophed @ 0.08 mcg/hr. CTH overnight for worsened mental status - no acute findings. CT AP for abd pain - no acute findings. Groaning/complaining of abdominal pain. Remains disoriented.    Review of patient's allergies indicates:   Allergen Reactions    Lisinopril      Current Facility-Administered Medications   Medication Frequency    0.9%  NaCl infusion (CRRT USE ONLY) Continuous    acetaminophen tablet 650 mg Q6H PRN    albuterol-ipratropium 2.5 mg-0.5 mg/3 mL nebulizer solution 3 mL Q4H WAKE    albuterol-ipratropium 2.5 mg-0.5 mg/3 mL nebulizer solution 3 mL Q6H PRN    atropine injection 0.5 mg PRN    azithromycin 500 mg in dextrose 5 % 250 mL IVPB (ready to mix system) Q24H    cefTRIAXone (ROCEPHIN) 2 g in dextrose 5 % 50 mL IVPB Q24H    dextrose 10% (D10W) Bolus PRN    dextrose 10% (D10W) Bolus PRN    glucagon (human recombinant) injection 1 mg PRN    glucose chewable tablet 16 g PRN    glucose chewable tablet 24 g PRN    heparin (porcine) injection 5,000 Units Q8H    hyoscyamine 0.125 mg/mL solution 0.125 mg TID    lactulose 20 gram/30 mL solution Soln 30 g TID    lidocaine HCl 2% urojet BID    magnesium sulfate 2g in water 50mL IVPB (premix) PRN    midodrine tablet 15 mg TID    norepinephrine 4 mg in dextrose 5% 250 mL infusion (premix) (titrating) Continuous    octreotide injection 100 mcg Q8H    ondansetron injection 4 mg Q8H PRN    pantoprazole injection 40 mg Daily    rifAXIMin tablet 550 mg BID    sodium chloride 0.9% flush 10 mL PRN    sodium phosphate 20.01 mmol in dextrose 5 % 250 mL IVPB PRN    sodium phosphate 30 mmol in dextrose 5 % 250 mL IVPB PRN    sodium phosphate 39.99 mmol in dextrose 5 % 250 mL IVPB PRN    vancomycin - pharmacy to dose pharmacy to manage frequency    vancomycin 1.5 g in dextrose 5 % 250 mL IVPB (ready to mix) Once    zinc sulfate capsule 220 mg Daily       Objective:     Vital Signs (Most Recent):  Temp:  98.2 °F (36.8 °C) (02/28/20 0700)  Pulse: (!) 54 (02/28/20 0900)  Resp: 16 (02/28/20 0900)  BP: (!) 102/54 (02/28/20 0900)  SpO2: (!) 90 % (02/28/20 0900)  O2 Device (Oxygen Therapy): High Flow nasal Cannula (02/28/20 0900) Vital Signs (24h Range):  Temp:  [95.8 °F (35.4 °C)-98.3 °F (36.8 °C)] 98.2 °F (36.8 °C)  Pulse:  [44-71] 54  Resp:  [9-28] 16  SpO2:  [86 %-99 %] 90 %  BP: ()/(42-79) 102/54     Weight: 91.8 kg (202 lb 6.1 oz) (02/27/20 1543)  Body mass index is 29.89 kg/m².  Body surface area is 2.11 meters squared.    I/O last 3 completed shifts:  In: 1697.7 [I.V.:1697.7]  Out: 1944 [Urine:2; Other:1942]    Physical Exam   Constitutional: He appears well-developed and well-nourished. No distress.   HENT:   Head: Normocephalic and atraumatic.   NGT in place   Eyes: Scleral icterus is present.   Cardiovascular: Regular rhythm. Exam reveals no gallop and no friction rub.   No murmur heard.  Bradycardia  L arm PICC in place   Pulmonary/Chest: Effort normal. No stridor. No respiratory distress. He has no wheezes. He has rales.   On 12 L HF-NC   Abdominal: Soft. He exhibits distension. There is tenderness.   Genitourinary:   Genitourinary Comments: [place] with minimal urine in bag   Musculoskeletal: He exhibits edema (BLE - pitting).   Neurological:   Somnolent, arouses to voice   Skin: He is not diaphoretic.   Multiple areas of ecchymosis, most notably to R arm  Grossly jaundiced     Significant Labs:  CBC:   Recent Labs   Lab 02/28/20  0315   WBC 16.50*   RBC 2.70*   HGB 8.8*   HCT 28.2*   PLT 73*   *   MCH 32.6*   MCHC 31.2*     CMP:   Recent Labs   Lab 02/28/20  0315   GLU 49*  49*   CALCIUM 9.5  9.5   ALBUMIN 4.1  4.1   PROT 6.8     136   K 4.7  4.7   CO2 18*  18*     100   BUN 89*  89*   CREATININE 3.3*  3.3*   ALKPHOS 242*   *   *   BILITOT 17.9*     Coagulation:   Recent Labs   Lab 02/28/20  0741   INR 2.3*      Significant Imaging:  X-Ray: Reviewed  CT:  Reviewed

## 2020-02-28 NOTE — SUBJECTIVE & OBJECTIVE
Interval History/Significant Events: Patient with worsening encephalopathy overnight, CT head ordered was unremarkable. He remains altered & moaning in pain. Patient started on broad spectrum antibiotics.    Review of Systems   Unable to perform ROS: Mental status change     Objective:     Vital Signs (Most Recent):  Temp: 97.9 °F (36.6 °C) (02/28/20 1100)  Pulse: (!) 56 (02/28/20 1300)  Resp: 15 (02/28/20 1300)  BP: (!) 98/46 (02/28/20 1300)  SpO2: (!) 93 % (02/28/20 1300) Vital Signs (24h Range):  Temp:  [95.8 °F (35.4 °C)-98.3 °F (36.8 °C)] 97.9 °F (36.6 °C)  Pulse:  [44-71] 56  Resp:  [9-28] 15  SpO2:  [86 %-99 %] 93 %  BP: ()/(42-55) 98/46   Weight: 91.6 kg (202 lb)  Body mass index is 29.83 kg/m².      Intake/Output Summary (Last 24 hours) at 2/28/2020 1351  Last data filed at 2/28/2020 1305  Gross per 24 hour   Intake 2628.76 ml   Output 2137 ml   Net 491.76 ml       Physical Exam   Constitutional: He appears well-developed. He appears distressed.   HENT:   Head: Normocephalic and atraumatic.   Eyes: Scleral icterus is present.   Cardiovascular: Intact distal pulses. Bradycardia present.   Clean dressing located across central chest from recent CABG . No redness or drainage around the area   Pulmonary/Chest: He is in respiratory distress. He has wheezes. He has rales.   R. IJ  in place   Abdominal: Soft. He exhibits no mass. There is tenderness (lower quadrant and groin).   Musculoskeletal: He exhibits no tenderness.   Neurological: He is alert. He is disoriented.   Skin: Skin is warm. There is erythema (Ecchymosis in R. lower extremity).   Psychiatric: He is agitated.       Vents:     Lines/Drains/Airways     Peripherally Inserted Central Catheter Line            PICC Double Lumen left brachial -- days          Central Venous Catheter Line            Trialysis (Dialysis) Catheter 02/27/20 1820 right internal jugular less than 1 day          Drain                 NG/OG Tube 02/28/20 0700 Right nostril  less than 1 day              Significant Labs:    CBC/Anemia Profile:  Recent Labs   Lab 02/27/20  1013 02/27/20  1652 02/27/20 2232 02/28/20 0315   WBC 13.99*  12.80*  --  16.86* 16.50*   HGB 8.0*  8.0*  --  8.0* 8.8*   HCT 25.4*  25.2* 26* 26.6* 28.2*   PLT 71*  68*  --  74* 73*   *  102*  --  106* 104*   RDW 23.1*  22.8*  --  23.7* 23.8*   IRON  --   --   --  77   FERRITIN  --   --   --  4,865*        Chemistries:  Recent Labs   Lab 02/27/20  1013 02/27/20 2027 02/28/20 0315     137 135* 136  136   K 4.8  4.5 5.0 4.7  4.7   CL 98  99 98 100  100   CO2 22*  21* 19* 18*  18*   *  162* 173* 89*  89*   CREATININE 5.7*  5.4* 5.7* 3.3*  3.3*   CALCIUM 10.6*  10.0 9.8 9.5  9.5   ALBUMIN 3.8  4.0 3.8 4.1  4.1   PROT 6.2  6.2  --  6.8   BILITOT 14.2*  13.5*  --  17.9*   ALKPHOS 205*  202*  --  242*   ALT 28  26  --  114*   AST 73*  66*  --  417*   MG  --  2.6 2.3   PHOS  --  8.0* 4.3  4.3       Blood Culture:   Recent Labs   Lab 02/27/20  1609 02/27/20  1616   LABBLOO No Growth to date No Growth to date     Lactic Acid:   Recent Labs   Lab 02/27/20  1013 02/27/20 2225 02/28/20 0315   LACTATE 2.5* 3.6* 3.5*       Significant Imaging:  CXR: I have reviewed all pertinent results/findings within the past 24 hours and my personal findings are:  CXR with b/l oulmonary infiltrates, left sided pleural effusion

## 2020-02-28 NOTE — PROGRESS NOTES
6-hr CRRT SLED initiated via RIJ trialysis. Good flows noted. UF rate 200-350ml/hr. Alarms set and lines secured. See flowsheet.

## 2020-02-28 NOTE — PLAN OF CARE
CMICU DAILY GOALS       A: Awake    RASS: Goal - RASS Goal: 0-->alert and calm  Actual - RASS (Salguero Agitation-Sedation Scale): 1-->restless   Restraint necessity:    B: Breath   SBT: Not intubated   C: Coordinate A & B, analgesics/sedatives   Pain: managed    SAT: Not intubated  D: Delirium   CAM-ICU: Overall CAM-ICU: Positive  E: Early Mobility   MOVE Screen: Fail   Activity: Activity Management: bedrest maintained per order  FAS: Feeding/Nutrition   Diet order: Diet/Nutrition Received: NPO,   Fluid restriction:    T: Thrombus   DVT prophylaxis: VTE Required Core Measure: Pharmacological prophylaxis initiated/maintained  H: HOB Elevation   Head of Bed (HOB): HOB at 30-45 degrees  U: Ulcer Prophylaxis   GI: yes  G: Glucose control   managed    S: Skin   Bundle compliance: yes   Bathing/Skin Care: bath, chlorhexidine, incontinence care, linen changed Date: <No height available>-1  B: Bowel Function   constipation   I: Indwelling Catheters   [place] necessity: [REMOVED]      Urethral Catheter 02/27/20 1300-Reason for Continuing Urinary Catheterization: Critically ill in ICU requiring intensive monitoring   CVC necessity: Yes   IPAD offered: Not appropriate  D: De-escalation Antibx   No  Plan for the day   CRRT, felix removed, lactulose enema administered  Family/Goals of care/Code Status   Code Status: Full Code     No acute events throughout day, VS and assessment per flow sheet, patient progressing towards goals as tolerated, plan of care reviewed with Sushil Mcmahon and family, all concerns addressed, will continue to monitor.

## 2020-02-28 NOTE — PROGRESS NOTES
Crrt restarted, good blood flow noted. /. uf goal set at 350 as ordered , with a map at present at 67.  B/P 96/46 pulse 49. . o2 sat 93%,   On nasal cannula high flow 15 liters

## 2020-02-28 NOTE — ASSESSMENT & PLAN NOTE
Severe abdominal pain in the suprapubic and penile region. Unclear why. Abdo compartment syndrome? Bladder unremarkable on scan  CT abdo WO contrast with findings of cirrhotic liver and prominent lymph nodes  Urology consult for penile pain   Possible SBP but unsafe pocket for paracentesis  Will treat with Vancomycin and Zosyn

## 2020-02-28 NOTE — ASSESSMENT & PLAN NOTE
This is a 68 y/o male with THOMAS cirrhosis (complicated by varices, ascites requiring weekly paracentesis, hepatic encephalopathy), a-fib, DM, hypothyroidism, CAD s/p recent CABG 2/11/20 who was transferred from [facility] to [facility] for liver TXP evaluation. He had been evaluated for liver TXP in [place] and sought second opinion at [facility], but found to have multivessel CAD in his work-up and initially deemed not amenable for PTCA and too high risk for CABG. Eventually saw Dr. Biggs (CT SURGERY) in [place] and underwent CABG 2/11/20 complicated post-operatively by PTX, decompensated liver failure, SALOMON - he has never left the hospital since that surgery. Baseline kidney function appears normal based on [facility] labs from Jan 2020 (BUN 16/1.1). On exam, he is bradycardic and somnolent. B rales throughout both lungs and LE edema. /Cr 5.4. Severely oliguric.    MELD-Na score: 38 at 2/28/2020  7:41 AM  MELD score: 38 at 2/28/2020  7:41 AM  Calculated from:  Serum Creatinine: 3.3 mg/dL at 2/28/2020  3:15 AM  Serum Sodium: 136 mmol/L at 2/28/2020  3:15 AM  Total Bilirubin: 17.9 mg/dL at 2/28/2020  3:15 AM  INR(ratio): 2.3 at 2/28/2020  7:41 AM  Age: 69 years    Overall, appears to be in renal failure with oliguria. Unclear etiology - likely secondary to ATN s/p CABG 2/11/20. Possible HRS given decompensated cirrhosis. Also with uremia vs. hepatic encephalopathy (ammonia 57).    RECOMMENDATIONS:  - initiated on SLED yesterday evening - tolerated 6 hours  - avoid nephrotoxins  - renally dose medications  - renal diet when eating  - closely following

## 2020-02-28 NOTE — PROGRESS NOTES
Pharmacokinetic Initial Assessment: IV Vancomycin    Assessment/Plan:    - Initiate vancomycin 1500 mg x 1 dose,  - Patient with SALOMON, received 6 hours of SLED overnight.  Nephrology planning for continuous SLED today.   - A random level is ordered with morning labs tomorrow. Pharmacy to re-dose based on level and RRT plans.     Pharmacy will continue to follow and monitor vancomycin.  Please contact pharmacy at extension [phone] with any questions regarding this assessment.     Thank you for the consult,   Cyndi Putnam, PharmD, BCCCP     Patient brief summary:  Sushil Mcmahon is a 69 y.o. male initiated on antimicrobial therapy with IV Vancomycin for treatment of suspected intra-abdominal infection.     Drug Allergies:   Review of patient's allergies indicates:   Allergen Reactions    Lisinopril      Actual Body Weight:   91.8 kg    Renal Function:   SALOMON requiring RRT     Dialysis Method (if applicable):  SLED x 6 hours overnight, waiting on nephrology's plan     CBC (last 72 hours):  Recent Labs   Lab Result Units 02/27/20  1013 02/27/20 2232 02/28/20  0315   WBC K/uL 13.99*  12.80* 16.86* 16.50*   Hemoglobin g/dL 8.0*  8.0* 8.0* 8.8*   Hemoglobin A1C % 5.9*  --   --    Hematocrit % 25.4*  25.2* 26.6* 28.2*   Platelets K/uL 71*  68* 74* 73*   Gran% % 82.8*  80.2* 80.8* 85.8*   Lymph% % 8.0*  10.2* 9.2* 6.3*   Mono% % 8.0  8.0 8.8 6.7   Eosinophil% % 0.5  1.0 0.3 0.4   Basophil% % 0.1  0.1 0.1 0.1   Differential Method  Automated  Automated Automated Automated       Metabolic Panel (last 72 hours):  Recent Labs   Lab Result Units 02/27/20  1013 02/27/20 2027 02/28/20  0315   Sodium mmol/L 136  137 135* 136  136   Potassium mmol/L 4.8  4.5 5.0 4.7  4.7   Chloride mmol/L 98  99 98 100  100   CO2 mmol/L 22*  21* 19* 18*  18*   Glucose mg/dL 94  118* 72 49*  49*   BUN, Bld mg/dL 172*  162* 173* 89*  89*   Creatinine mg/dL 5.7*  5.4* 5.7* 3.3*  3.3*   Albumin g/dL 3.8  4.0 3.8 4.1  4.1    Total Bilirubin mg/dL 14.2*  13.5*  --  17.9*   Alkaline Phosphatase U/L 205*  202*  --  242*   AST U/L 73*  66*  --  417*   ALT U/L 28  26  --  114*   Magnesium mg/dL  --  2.6 2.3   Phosphorus mg/dL  --  8.0* 4.3  4.3       Drug levels (last 3 results):  No results for input(s): VANCOMYCINRA, VANCOMYCINPE, VANCOMYCINTR in the last 72 hours.    Microbiologic Results:  Microbiology Results (last 7 days)     Procedure Component Value Units Date/Time    Blood culture [[id number]] Collected:  02/27/20 1609    Order Status:  Completed Specimen:  Blood from Peripheral, Antecubital, Right Updated:  02/28/20 0115     Blood Culture, Routine No Growth to date    Blood culture [[id number]] Collected:  02/27/20 1616    Order Status:  Completed Specimen:  Blood from Peripheral, Hand, Right Updated:  02/28/20 0115     Blood Culture, Routine No Growth to date    Respiratory Infection Panel, Nasopharyngeal [[id number]] Collected:  02/27/20 2225    Order Status:  Completed Specimen:  Nasopharyngeal Swab Updated:  02/28/20 0002     Respiratory Infection Panel Source NP Swab     Adenovirus Not Detected     Coronavirus 229E, Common Cold Virus Not Detected     Coronavirus HKU1, Common Cold Virus Not Detected     Coronavirus NL63, Common Cold Virus Not Detected     Coronavirus OC43, Common Cold Virus Not Detected     Comment: The Coronavirus strains detected in this test cause the common cold.  These strains are not the COVID-19 (novel Coronavirus)strain   associated with the respiratory disease outbreak.          Human Metapneumovirus Not Detected     Human Rhinovirus/Enterovirus Not Detected     Influenza A (subtypes H1, H1-2009,H3) Not Detected     Influenza B Not Detected     Parainfluenza Virus 1 Not Detected     Parainfluenza Virus 2 Not Detected     Parainfluenza Virus 3 Not Detected     Parainfluenza Virus 4 Not Detected     Respiratory Syncytial Virus Not Detected     Bordetella Parapertussis (EC0468) Not Detected      Bordetella pertussis (ptxP) Not Detected     Chlamydia pneumoniae Not Detected     Mycoplasma pneumoniae Not Detected     Comment: Respiratory Infection Panel testing performed by Multiplex PCR.       Narrative:       For all other respiratory sources order [id number] Respiratory  Viral Panel by PCR (RVPCR)    Influenza A & B by Molecular [[id number]] Collected:  02/27/20 2225    Order Status:  Completed Specimen:  Nasopharyngeal Swab Updated:  02/27/20 2308     Influenza A, Molecular Negative     Influenza B, Molecular Negative     Flu A & B Source Nasal swab

## 2020-02-28 NOTE — CONSULTS
[facility]  Hepatology Consult Note         Patient ID:  NAME: Sushil Mcmahon  MR#: [id number]  : 1950    SUBJECTIVE:  CC: No chief complaint on file.      HPI: Mr. Sushil Mcmahon is a 69 y.o. male w/ PMH THOMAS cirrhosis (complicated by varices in Dec 2019, ascites requiring weekly paracentesis, hepatic encephalopathy), a-fib, T2DM, hypothyroidism, CAD s/p recent CABG 20 who was transferred from [facility] ([place], [place]) to [facility] for OLT evaluation. Patient confused when hepatology saw the patient, most of the history was gathered from the wife and chart review. Patient was diagnosed with THOMAS cirrhosis somewhere around 2019 and during his liver transplant evaluation/work up in [place] was found to have a multivessel CAD . He then was seen at [facility] in [place] for an evaluation for CABG and Liver transplant combined together at the same time but given his comorbidities, risk factors and high risk of mortality he was not considered a candidate there. He then subsequently ended up following up with cardiology and CTS in [place] and had a CABG on 2020 at [facility] without a combined OLT. Patient was informed that the surgery is very high risk and he has a high chance of mortality post surgery but patient and family wanted to pursue it. S/P CABG patient continued to be hospitalized and post op complications including pneumothorax, acute renal failure and decompensating liver disease. He was then transferred to [facility] after the case was reviewed with transplant team to evaluate for OLT. Patient's baseline MELD initially a few months prior was 28 and it was 36 on arrival to [facility]. Hepatology consulted for decompensated cirrhosis requiring OLT evaluation.     On evaluation patient had Grade II-III encephalopathy and was in acute distress due to back/penile and abdominal pain. He was also found to be hypoxic, hypotensive and with worsening  anuric SALOMON on presentation so pressors were initiated and CRRT was started. Patient was not having any acute over bleeding.     No past medical history on file.  No past surgical history on file.  No family history on file.  Social History     Socioeconomic History    Marital status:      Spouse name: Not on file    Number of children: Not on file    Years of education: Not on file    Highest education level: Not on file   Occupational History    Not on file   Social Needs    Financial resource strain: Not on file    Food insecurity:     Worry: Not on file     Inability: Not on file    Transportation needs:     Medical: Not on file     Non-medical: Not on file   Tobacco Use    Smoking status: Not on file   Substance and Sexual Activity    Alcohol use: Not on file    Drug use: Not on file    Sexual activity: Not on file   Lifestyle    Physical activity:     Days per week: Not on file     Minutes per session: Not on file    Stress: Not on file   Relationships    Social connections:     Talks on phone: Not on file     Gets together: Not on file     Attends Jew service: Not on file     Active member of club or organization: Not on file     Attends meetings of clubs or organizations: Not on file     Relationship status: Not on file   Other Topics Concern    Not on file   Social History Narrative    Not on file       There is no immunization history on file for this patient.  No current facility-administered medications on file prior to encounter.      No current outpatient medications on file prior to encounter.     Review of patient's allergies indicates:   Allergen Reactions    Lisinopril        Review of Systems   Constitutional: Positive for malaise/fatigue. Negative for chills, diaphoresis, fever and weight loss.   HENT: Negative for hearing loss.    Respiratory: Negative for cough, hemoptysis, sputum production, shortness of breath and wheezing.    Cardiovascular: Positive for chest  "pain. Negative for palpitations, orthopnea, claudication, leg swelling and PND.   Gastrointestinal: Positive for abdominal pain. Negative for diarrhea, melena, nausea and vomiting.   Genitourinary:        Anuric   Musculoskeletal: Positive for back pain. Negative for myalgias.   Skin: Negative for itching.   Neurological: Positive for dizziness and headaches. Negative for focal weakness and seizures.   Psychiatric/Behavioral:        Confusion       OBJECTIVE:  Initial Vitals [02/27/20 0825]   BP Pulse Resp Temp SpO2   (!) 107/57 (!) 54 20 98.3 °F (36.8 °C) (!) 94 %      MAP       --         Vitals:    02/28/20 0900 02/28/20 1000 02/28/20 1100 02/28/20 1125   BP: (!) 102/54 (!) 98/50 (!) 108/52    BP Location: Right arm Right arm Right arm    Patient Position: Lying Lying Lying    Pulse: (!) 54 (!) 52 (!) 52 (!) 48   Resp: 16 (!) 23 (!) 25 15   Temp:   97.9 °F (36.6 °C)    TempSrc:   Axillary    SpO2: (!) 90% (!) 90% (!) 91% (!) 92%   Weight:   91.8 kg (202 lb 6.1 oz)    Height:   5' 9" (1.753 m)      Vitals 2/28/2020   Height 5' 9"   Weight (lbs) 202.38   BMI (kg/m2) -       Physical Exam   Constitutional: He is well-developed, well-nourished, and in no distress. No distress.   Ill appearing    HENT:   Head: Normocephalic and atraumatic.   Eyes: Pupils are equal, round, and reactive to light. Scleral icterus is present.   Neck: Normal range of motion. No JVD present.   Cardiovascular: Normal rate and regular rhythm.   No murmur heard.  Left sided PICC line in place   Pulmonary/Chest: Effort normal. No respiratory distress. He has wheezes. He has rales.   Post sternotomy surgical site healing well   Abdominal: Soft. He exhibits distension. He exhibits no mass. There is tenderness. There is no rebound and no guarding.   Genitourinary:   Genitourinary Comments: [place] in place     Musculoskeletal: Normal range of motion. He exhibits edema (Bilateral lower extemities).   Lymphadenopathy:     He has no cervical " adenopathy.   Neurological:   Confused, lethargic and somnolent    Skin: Skin is warm. No rash noted. He is not diaphoretic. No erythema.   Ecchymosis         CRANIAL NERVES     CN III, IV, VI   Pupils are equal, round, and reactive to light.        Laboratory:   Recent Results (from the past 24 hour(s))   ISTAT PROCEDURE    Collection Time: 02/27/20  2:43 PM   Result Value Ref Range    POC PH 7.313 (L) 7.35 - 7.45    POC PCO2 42.3 35 - 45 mmHg    POC PO2 59 (LL) 80 - 100 mmHg    POC HCO3 21.4 (L) 24 - 28 mmol/L    POC BE -5 -2 to 2 mmol/L    POC SATURATED O2 88 (L) 95 - 100 %    POC TCO2 23 23 - 27 mmol/L    Sample ARTERIAL     Site LR     Allens Test Pass     DelSys NRB    Brain natriuretic peptide    Collection Time: 02/27/20  3:55 PM   Result Value Ref Range     (H) 0 - 99 pg/mL   Protime-INR    Collection Time: 02/27/20  3:55 PM   Result Value Ref Range    Prothrombin Time 18.0 (H) 9.0 - 12.5 sec    INR 1.9 (H) 0.8 - 1.2   Blood culture    Collection Time: 02/27/20  4:09 PM   Result Value Ref Range    Blood Culture, Routine No Growth to date    Blood culture    Collection Time: 02/27/20  4:16 PM   Result Value Ref Range    Blood Culture, Routine No Growth to date    ISTAT PROCEDURE    Collection Time: 02/27/20  4:52 PM   Result Value Ref Range    POC PH 7.334 (L) 7.35 - 7.45    POC PCO2 40.2 35 - 45 mmHg    POC PO2 66 (L) 80 - 100 mmHg    POC HCO3 21.4 (L) 24 - 28 mmol/L    POC BE -4 -2 to 2 mmol/L    POC SATURATED O2 91 (L) 95 - 100 %    POC Sodium 135 (L) 136 - 145 mmol/L    POC Potassium 4.6 3.5 - 5.1 mmol/L    POC TCO2 23 23 - 27 mmol/L    POC Ionized Calcium 1.26 1.06 - 1.42 mmol/L    POC Hematocrit 26 (L) 36 - 54 %PCV    Sample ARTERIAL     Site RR     Allens Test Pass     DelSys Nasal Can    Renal function panel    Collection Time: 02/27/20  8:27 PM   Result Value Ref Range    Glucose 72 70 - 110 mg/dL    Sodium 135 (L) 136 - 145 mmol/L    Potassium 5.0 3.5 - 5.1 mmol/L    Chloride 98 95 - 110  mmol/L    CO2 19 (L) 23 - 29 mmol/L    BUN, Bld 173 (H) 8 - 23 mg/dL    Calcium 9.8 8.7 - 10.5 mg/dL    Creatinine 5.7 (H) 0.5 - 1.4 mg/dL    Albumin 3.8 3.5 - 5.2 g/dL    Phosphorus 8.0 (H) 2.7 - 4.5 mg/dL    eGFR if African American 10.9 (A) >60 mL/min/1.73 m^2    eGFR if non African American 9.4 (A) >60 mL/min/1.73 m^2    Anion Gap 18 (H) 8 - 16 mmol/L   Magnesium    Collection Time: 02/27/20  8:27 PM   Result Value Ref Range    Magnesium 2.6 1.6 - 2.6 mg/dL   Troponin I    Collection Time: 02/27/20  8:27 PM   Result Value Ref Range    Troponin I 0.271 (H) 0.000 - 0.026 ng/mL   ISTAT PROCEDURE    Collection Time: 02/27/20  8:34 PM   Result Value Ref Range    POC PH 7.237 (L) 7.35 - 7.45    POC PCO2 54.8 (H) 35 - 45 mmHg    POC PO2 47 40 - 60 mmHg    POC HCO3 23.3 (L) 24 - 28 mmol/L    POC BE -4 -2 to 2 mmol/L    POC SATURATED O2 74 (L) 95 - 100 %    POC TCO2 25 24 - 29 mmol/L    Sample VENOUS     Site Other     Allens Test N/A     DelSys NRB     Mode SPONT     Flow 15     Sp02 96    Influenza A & B by Molecular    Collection Time: 02/27/20 10:25 PM   Result Value Ref Range    Influenza A, Molecular Negative Negative    Influenza B, Molecular Negative Negative    Flu A & B Source Nasal swab    Lactic acid, plasma    Collection Time: 02/27/20 10:25 PM   Result Value Ref Range    Lactate (Lactic Acid) 3.6 (HH) 0.5 - 2.2 mmol/L   Respiratory Infection Panel, Nasopharyngeal    Collection Time: 02/27/20 10:25 PM   Result Value Ref Range    Respiratory Infection Panel Source NP Swab Not Detected    Adenovirus Not Detected Not Detected    Coronavirus 229E, Common Cold Virus Not Detected Not Detected    Coronavirus HKU1, Common Cold Virus Not Detected Not Detected    Coronavirus NL63, Common Cold Virus Not Detected Not Detected    Coronavirus OC43, Common Cold Virus Not Detected Not Detected    Human Metapneumovirus Not Detected Not Detected    Human Rhinovirus/Enterovirus Not Detected Not Detected    Influenza A  (subtypes H1, H1-2009,H3) Not Detected Not Detected    Influenza B Not Detected Not Detected    Parainfluenza Virus 1 Not Detected Not Detected    Parainfluenza Virus 2 Not Detected Not Detected    Parainfluenza Virus 3 Not Detected Not Detected    Parainfluenza Virus 4 Not Detected Not Detected    Respiratory Syncytial Virus Not Detected Not Detected    Bordetella Parapertussis (PQ6529) Not Detected Not Detected    Bordetella pertussis (ptxP) Not Detected Not Detected    Chlamydia pneumoniae Not Detected Not Detected    Mycoplasma pneumoniae Not Detected Not Detected   CBC auto differential    Collection Time: 02/27/20 10:32 PM   Result Value Ref Range    WBC 16.86 (H) 3.90 - 12.70 K/uL    RBC 2.51 (L) 4.60 - 6.20 M/uL    Hemoglobin 8.0 (L) 14.0 - 18.0 g/dL    Hematocrit 26.6 (L) 40.0 - 54.0 %    Mean Corpuscular Volume 106 (H) 82 - 98 fL    Mean Corpuscular Hemoglobin 31.9 (H) 27.0 - 31.0 pg    Mean Corpuscular Hemoglobin Conc 30.1 (L) 32.0 - 36.0 g/dL    RDW 23.7 (H) 11.5 - 14.5 %    Platelets 74 (L) 150 - 350 K/uL    MPV 12.6 9.2 - 12.9 fL    Immature Granulocytes 0.8 (H) 0.0 - 0.5 %    Gran # (ANC) 13.6 (H) 1.8 - 7.7 K/uL    Immature Grans (Abs) 0.13 (H) 0.00 - 0.04 K/uL    Lymph # 1.6 1.0 - 4.8 K/uL    Mono # 1.5 (H) 0.3 - 1.0 K/uL    Eos # 0.1 0.0 - 0.5 K/uL    Baso # 0.01 0.00 - 0.20 K/uL    nRBC 0 0 /100 WBC    Gran% 80.8 (H) 38.0 - 73.0 %    Lymph% 9.2 (L) 18.0 - 48.0 %    Mono% 8.8 4.0 - 15.0 %    Eosinophil% 0.3 0.0 - 8.0 %    Basophil% 0.1 0.0 - 1.9 %    Differential Method Automated    Troponin I    Collection Time: 02/28/20 12:00 AM   Result Value Ref Range    Troponin I 0.313 (H) 0.000 - 0.026 ng/mL   PSA, Screening    Collection Time: 02/28/20  3:15 AM   Result Value Ref Range    PSA, SCREEN 0.15 0.00 - 4.00 ng/mL   Iron and TIBC    Collection Time: 02/28/20  3:15 AM   Result Value Ref Range    Iron 77 45 - 160 ug/dL    Transferrin 100 (L) 200 - 375 mg/dL    TIBC 148 (L) 250 - 450 ug/dL    Saturated  Iron 52 (H) 20 - 50 %   Ferritin    Collection Time: 02/28/20  3:15 AM   Result Value Ref Range    Ferritin 4,865 (H) 20.0 - 300.0 ng/mL   Renal function panel    Collection Time: 02/28/20  3:15 AM   Result Value Ref Range    Glucose 49 (LL) 70 - 110 mg/dL    Sodium 136 136 - 145 mmol/L    Potassium 4.7 3.5 - 5.1 mmol/L    Chloride 100 95 - 110 mmol/L    CO2 18 (L) 23 - 29 mmol/L    BUN, Bld 89 (H) 8 - 23 mg/dL    Calcium 9.5 8.7 - 10.5 mg/dL    Creatinine 3.3 (H) 0.5 - 1.4 mg/dL    Albumin 4.1 3.5 - 5.2 g/dL    Phosphorus 4.3 2.7 - 4.5 mg/dL    eGFR if  21.0 (A) >60 mL/min/1.73 m^2    eGFR if non  18.2 (A) >60 mL/min/1.73 m^2    Anion Gap 18 (H) 8 - 16 mmol/L   Magnesium    Collection Time: 02/28/20  3:15 AM   Result Value Ref Range    Magnesium 2.3 1.6 - 2.6 mg/dL   TSH    Collection Time: 02/28/20  3:15 AM   Result Value Ref Range    TSH 0.028 (L) 0.400 - 4.000 uIU/mL   Lactic acid, plasma    Collection Time: 02/28/20  3:15 AM   Result Value Ref Range    Lactate (Lactic Acid) 3.5 (HH) 0.5 - 2.2 mmol/L   CBC auto differential    Collection Time: 02/28/20  3:15 AM   Result Value Ref Range    WBC 16.50 (H) 3.90 - 12.70 K/uL    RBC 2.70 (L) 4.60 - 6.20 M/uL    Hemoglobin 8.8 (L) 14.0 - 18.0 g/dL    Hematocrit 28.2 (L) 40.0 - 54.0 %    Mean Corpuscular Volume 104 (H) 82 - 98 fL    Mean Corpuscular Hemoglobin 32.6 (H) 27.0 - 31.0 pg    Mean Corpuscular Hemoglobin Conc 31.2 (L) 32.0 - 36.0 g/dL    RDW 23.8 (H) 11.5 - 14.5 %    Platelets 73 (L) 150 - 350 K/uL    MPV 12.6 9.2 - 12.9 fL    Immature Granulocytes 0.7 (H) 0.0 - 0.5 %    Gran # (ANC) 14.2 (H) 1.8 - 7.7 K/uL    Immature Grans (Abs) 0.11 (H) 0.00 - 0.04 K/uL    Lymph # 1.0 1.0 - 4.8 K/uL    Mono # 1.1 (H) 0.3 - 1.0 K/uL    Eos # 0.1 0.0 - 0.5 K/uL    Baso # 0.01 0.00 - 0.20 K/uL    nRBC 0 0 /100 WBC    Gran% 85.8 (H) 38.0 - 73.0 %    Lymph% 6.3 (L) 18.0 - 48.0 %    Mono% 6.7 4.0 - 15.0 %    Eosinophil% 0.4 0.0 - 8.0 %    Basophil%  0.1 0.0 - 1.9 %    Differential Method Automated    Comprehensive metabolic panel    Collection Time: 02/28/20  3:15 AM   Result Value Ref Range    Sodium 136 136 - 145 mmol/L    Potassium 4.7 3.5 - 5.1 mmol/L    Chloride 100 95 - 110 mmol/L    CO2 18 (L) 23 - 29 mmol/L    Glucose 49 (LL) 70 - 110 mg/dL    BUN, Bld 89 (H) 8 - 23 mg/dL    Creatinine 3.3 (H) 0.5 - 1.4 mg/dL    Calcium 9.5 8.7 - 10.5 mg/dL    Total Protein 6.8 6.0 - 8.4 g/dL    Albumin 4.1 3.5 - 5.2 g/dL    Total Bilirubin 17.9 (H) 0.1 - 1.0 mg/dL    Alkaline Phosphatase 242 (H) 55 - 135 U/L     (H) 10 - 40 U/L     (H) 10 - 44 U/L    Anion Gap 18 (H) 8 - 16 mmol/L    eGFR if African American 21.0 (A) >60 mL/min/1.73 m^2    eGFR if non  18.2 (A) >60 mL/min/1.73 m^2   Phosphorus    Collection Time: 02/28/20  3:15 AM   Result Value Ref Range    Phosphorus 4.3 2.7 - 4.5 mg/dL   T4, free    Collection Time: 02/28/20  3:15 AM   Result Value Ref Range    Free T4 0.95 0.71 - 1.51 ng/dL   Type & Screen    Collection Time: 02/28/20  4:00 AM   Result Value Ref Range    Group & Rh O NEG     Indirect William NEG    POCT glucose    Collection Time: 02/28/20  4:40 AM   Result Value Ref Range    POCT Glucose 41 (LL) 70 - 110 mg/dL   POCT glucose    Collection Time: 02/28/20  5:04 AM   Result Value Ref Range    POCT Glucose 149 (H) 70 - 110 mg/dL   POCT glucose    Collection Time: 02/28/20  7:40 AM   Result Value Ref Range    POCT Glucose 89 70 - 110 mg/dL   Protime-INR    Collection Time: 02/28/20  7:41 AM   Result Value Ref Range    Prothrombin Time 21.9 (H) 9.0 - 12.5 sec    INR 2.3 (H) 0.8 - 1.2         ASSESSMENT & PLAN:    # THOMAS Cirrhosis   # Decompensated liver failure  # Hepatorenal Syndrome     Mr. Sushil Mcmahon is a 69 y.o. male w/ PMH THOMAS cirrhosis (complicated by varices in Dec 2019, ascites requiring weekly paracentesis, hepatic encephalopathy), a-fib, T2DM, hypothyroidism, CAD s/p recent CABG 2/11/20 who was transferred from  [facility] ([place], [place]) to [facility] for OLT evaluation. Patient with a MELD of 38 today and with worsening renal failure likely hepatorenal syndrome. Acutely worsening decompensated cirrhosis with encephalopathy at this time. Patient roxie class C cirrhosis. Patient will need to be optimized and resuscitated prior to presentation to the LTS committee as the risks outweigh the benefits of the surgery. Patient hemodynamically unstable, in distributive shock  and on vasopressors at this time. Patient's CRAFT score of post surgical mortality in a patient with cirrhosis is 88% in 30 days. CT abdomen and CT head reviewed. Labs consistent with INR of 1.9 and now 2.3 , AST::114 worse than before, Alk Phos 242, Scr 5.4 then 3.3 this am, Platelets 73. Blood cultures with gram negative rods susceptibility pending.     Plan:    - Needs continuous supportive care and aggressive resuscitation   - Cont Empiric IV Antibiotics  - Cont Midodrine, IV Octreotide, Albumin with a GOAL to keep MAP > 85   - IR guided paracentesis to rule out SBP  - Continue treatment for Gram Negative Bacteremia   - Recommend a 2D Echo today   - AFP tumor marker   - Not a candidate for OLT given his hemodynamic instability but will continue to monitor and present to LTS once there is clinical improvement.   - Will follow along  -  Continue monitoring serial INR/LFT's/CBC            Jessie Silverman MD   PGY-2  Hepatology  [facility]

## 2020-02-28 NOTE — PLAN OF CARE
Goals and POC established this date.   Problem: Occupational Therapy Goal  Goal: Occupational Therapy Goal  Description  Goals to be met by: 7 days 3/6/2020     Patient will increase functional independence with ADLs by performing:    Pt to complete UE dressing with MIN A   Pt to complete g/h skills seated with set-up  Pt to t/f to commode with MIN A   Pt to complete toileting with MOD A   Pt to tolerated OOB to chair x 2-3 hours daily to increase endurance for activity.        Outcome: Ongoing, Progressing

## 2020-02-28 NOTE — PLAN OF CARE
Problem: Physical Therapy Goal  Goal: Physical Therapy Goal  Description  Goals to be met by: 3/13/2020    Patient will increase functional independence with mobility by performin. Supine to sit with Moderate Assistance - not met  2. Sit to stand transfer with Moderate Assistance - not met  3. Gait  x 10 feet with Moderate Assistance - not met  4. Ascend/descend 2 stair with Moderate Assistance - not met  5. Sitting at edge of bed x8 minutes with Contact Guard Assistance - not met  6. Lower extremity exercise program x15 reps per handout, with independence - not met     Outcome: Ongoing, Progressing    Eval completed and goals appropriate

## 2020-02-28 NOTE — PROGRESS NOTES
[facility]  Critical Care Medicine  Progress Note    Patient Name: Sushil Mcmahon  MRN: [id number]  Admission Date: 2/27/2020  Hospital Length of Stay: 1 days  Code Status: Full Code  Attending Provider: Surinder Mahan MD  Primary Care Provider: Denis Fulton MD   Principal Problem: Decompensated HCV cirrhosis    Subjective:     HPI:  Mr. Mcmahon is a 70yo male with cirrhosis/liver failure due to THOMAS, history of hepatic encephalopathy, variceal bleeding, diabetes, hypothyroidism, anxiety/depression, hypotension, A-fib, recent CABG 2/11/2020 who presents as a transfer from [facility] in [place] for liver transplant evaluation. Per notes, patient was previously worked up with liver transplant but was not a candidate due to multiple comorbidities including multi vessel coronary artery disease which required CABG. He was deemed high risk due to multiple comorbidities, also turned down at [facility] where he presented for a second opinion. He eventually went to [facility] were he got a 4- vessel CABG on 2/11. The procedure was complicated by pneumothorax, SALOMON and decompensated liver failure with (MELD 30). He was stabilized and transferred to [facility] for transplant hepatology evaluation, however he was denied candidacy. Patient was then transferred to [facility] 2/27 for higher level of care and liver transplant evaluation. Critical care consulted for hypoxia, encephalopathy and acute renal failure.    On evaluation, patient was encephalopathic and  appeared in distress 2/2 to pain, saturating 86% in 15L n/c. Per wife, patient has been experiencing worse pain in the past few days. The pain is nonspecifically located in his groin/pelvic region. He was also reported to be anuric since arrival at with [facility]. Labs significant for WBC 12.80, HGB 8.0, PLT 6.8, BUN/Cr 162/5.4, lactic acid of 2.5. Patient was given a dose of dilaudid on the floor,  after which he became difficult to arouse, this was subsequently treated with Narcan and patient responded appropriately.     Hospital/ICU Course:  Mr. Mcmahon was admitted to MICU for acute metabolic encephalopathy, hypoxia and acute respiratory failure. Patient was started on HD for for uremia, oliguria and renal failure. CT head was ordered to evaluate for worsening encephalopathy, which was unremarkable. CT abd/pelvis revealed b/l pleural effusions and a cirrhotic liver with prominent lymph nodes. Patient was started on broad spectrum antibiotics to cover for SBP & PNA and septic shock. He was also started on Norepinephrine to maintain MAPs of 65. Patient also started on lactulose enema's for hepatic encephalopathy.       Interval History/Significant Events: Patient with worsening encephalopathy overnight, CT head ordered was unremarkable. He remains altered & moaning in pain. Patient started on broad spectrum antibiotics.    Review of Systems   Unable to perform ROS: Mental status change     Objective:     Vital Signs (Most Recent):  Temp: 97.9 °F (36.6 °C) (02/28/20 1100)  Pulse: (!) 56 (02/28/20 1300)  Resp: 15 (02/28/20 1300)  BP: (!) 98/46 (02/28/20 1300)  SpO2: (!) 93 % (02/28/20 1300) Vital Signs (24h Range):  Temp:  [95.8 °F (35.4 °C)-98.3 °F (36.8 °C)] 97.9 °F (36.6 °C)  Pulse:  [44-71] 56  Resp:  [9-28] 15  SpO2:  [86 %-99 %] 93 %  BP: ()/(42-55) 98/46   Weight: 91.6 kg (202 lb)  Body mass index is 29.83 kg/m².      Intake/Output Summary (Last 24 hours) at 2/28/2020 1351  Last data filed at 2/28/2020 1305  Gross per 24 hour   Intake 2628.76 ml   Output 2137 ml   Net 491.76 ml       Physical Exam   Constitutional: He appears well-developed. He appears distressed.   HENT:   Head: Normocephalic and atraumatic.   Eyes: Scleral icterus is present.   Cardiovascular: Intact distal pulses. Bradycardia present.   Clean dressing located across central chest from recent CABG . No redness or drainage around  the area   Pulmonary/Chest: He is in respiratory distress. He has wheezes. He has rales.   R. IJ  in place   Abdominal: Soft. He exhibits no mass. There is tenderness (lower quadrant and groin).   Musculoskeletal: He exhibits no tenderness.   Neurological: He is alert. He is disoriented.   Skin: Skin is warm. There is erythema (Ecchymosis in R. lower extremity).   Psychiatric: He is agitated.       Vents:     Lines/Drains/Airways     Peripherally Inserted Central Catheter Line            PICC Double Lumen left brachial -- days          Central Venous Catheter Line            Trialysis (Dialysis) Catheter 02/27/20 1820 right internal jugular less than 1 day          Drain                 NG/OG Tube 02/28/20 0700 Right nostril less than 1 day              Significant Labs:    CBC/Anemia Profile:  Recent Labs   Lab 02/27/20  1013 02/27/20  1652 02/27/20 2232 02/28/20  0315   WBC 13.99*  12.80*  --  16.86* 16.50*   HGB 8.0*  8.0*  --  8.0* 8.8*   HCT 25.4*  25.2* 26* 26.6* 28.2*   PLT 71*  68*  --  74* 73*   *  102*  --  106* 104*   RDW 23.1*  22.8*  --  23.7* 23.8*   IRON  --   --   --  77   FERRITIN  --   --   --  4,865*        Chemistries:  Recent Labs   Lab 02/27/20  1013 02/27/20 2027 02/28/20  0315     137 135* 136  136   K 4.8  4.5 5.0 4.7  4.7   CL 98  99 98 100  100   CO2 22*  21* 19* 18*  18*   *  162* 173* 89*  89*   CREATININE 5.7*  5.4* 5.7* 3.3*  3.3*   CALCIUM 10.6*  10.0 9.8 9.5  9.5   ALBUMIN 3.8  4.0 3.8 4.1  4.1   PROT 6.2  6.2  --  6.8   BILITOT 14.2*  13.5*  --  17.9*   ALKPHOS 205*  202*  --  242*   ALT 28  26  --  114*   AST 73*  66*  --  417*   MG  --  2.6 2.3   PHOS  --  8.0* 4.3  4.3       Blood Culture:   Recent Labs   Lab 02/27/20  1609 02/27/20  1616   LABBLOO No Growth to date No Growth to date     Lactic Acid:   Recent Labs   Lab 02/27/20  1013 02/27/20  2225 02/28/20  0315   LACTATE 2.5* 3.6* 3.5*       Significant Imaging:  CXR: I have  reviewed all pertinent results/findings within the past 24 hours and my personal findings are:  CXR with b/l oulmonary infiltrates, left sided pleural effusion      ABG  Recent Labs   Lab 02/27/20 2034   PH 7.237*   PO2 47   PCO2 54.8*   HCO3 23.3*   BE -4     Assessment/Plan:     Pulmonary  Acute respiratory failure with hypoxia  68 yo male with s/p recent CABAG and decompensated cirrhosis with history of ascites and esophageal varices here for liver transplant evaluation now with acute hypoxia.     Post CABG complication with pneumothorax and pleural effusion from OSH. Pneumothorax resolved, chest tube removed at OSH.   Thoracentesis at OSH with 600 cc removed    Repeat chest xray with evidence of pulmonary edema with possible loculation on left and opacity on right. Possible ADHF vs aspiration pneumonia/pneumontitis vs hepatic hydrothorax vs volume over load from ARF    Plan:  - CXR with worsening pulmonary infiltrates, volume optimization via dialysis  - Start Vancomycin and Zosyn  - Flu negative, RIP negative  - Wean off oxygen as tolerated, currently on 12L  HFNC saturating at 91%         Cardiac/Vascular  Coronary artery disease involving coronary bypass graft of native heart with angina pectoris  CABG in Feb, 2020 with post-op complication of pneumothorax and pleural effusion   Bradycardia on the floor  Chest xray with pulmonary edema   EKG with sinus bradycardia  Troponin with flat trend  2D ECHO pending    Renal/  Penile pain  Urology consulted, recommended felix to be removed    SALOMON (acute kidney injury)  Decompensated cirrhosis with anuric SALOMON, sCr 5.4, unclear baseline however sCr was 1.0 in January, 2020. Possible HRS vs ATN  Renal US without acute abnormalities   Nephrology Consult for possible HD needs   Continue SLED  BUN improved from 173 to 89    ID  Septic shock  Unknown etiology at this time, suspect SBP vs PNA. Currently requiring vasopressor support.  Lactic acid 3.5. WBC 16.5  Blood  cultures NGTD  Started on Vancomycin and Zosyn    Hematology  Thrombocytopenia  Severe thrombocytopenia 68. Likely secondary to cirrhosis, 120 in Jan, 2020  Daily CBC     Endocrine  Severe malnutrition  Started on tube feeds    GI  * decompensated cirrhosis   68 yo male with decompensated THOMAS cirrhosis here for transplant evaluation   Ascites and esophageal varices - paracentesis and banding recently   Liver US with cirrhosis and hepatic lesions (possible HCC)  Elevated PT/INR with thrombocytopenia, mild elevation of AST/ALT     MELD-Na score: 39 at 2/28/2020  1:12 PM  MELD score: 39 at 2/28/2020  1:12 PM  Calculated from:  Serum Creatinine: 3.5 mg/dL at 2/28/2020  1:12 PM  Serum Sodium: 135 mmol/L at 2/28/2020  1:12 PM  Total Bilirubin: 17.9 mg/dL at 2/28/2020  3:15 AM  INR(ratio): 2.3 at 2/28/2020  7:41 AM  Age: 69 years    Plan:  Lactulose/rifaxamine   Possible SBP but unsafe pocket for paracentesis  Start Vancomycin and Zosyn  CT abdomen without acute infectious process  Urology consulted for penile pain, recommended removing felix catheter  Hepatology following with continue to monitor      Abdominal pain  Severe abdominal pain in the suprapubic and penile region. Unclear why. Abdo compartment syndrome? Bladder unremarkable on scan  CT abdo WO contrast with findings of cirrhotic liver and prominent lymph nodes  Urology consult for penile pain   Possible SBP but unsafe pocket for paracentesis  Will treat with Vancomycin and Zosyn       Critical Care Daily Checklist:    A: Awake: RASS Goal/Actual Goal: RASS Goal: 0-->alert and calm  Actual: Salguero Agitation Sedation Scale (RASS): Restless   B: Spontaneous Breathing Trial Performed?     C: SAT & SBT Coordinated?  N/A                   D: Delirium: CAM-ICU Overall CAM-ICU: Positive   E: Early Mobility Performed? No   F: Feeding Goal: Goals: Meet % EEN, EPN by RD f/u date  Status: Nutrition Goal Status: new   Current Diet Order   No orders of the defined  types were placed in this encounter.      AS: Analgesia/Sedation N/A   T: Thromboembolic Prophylaxis Heparin 5000U Q8H   H: HOB > 300 Yes   U: Stress Ulcer Prophylaxis (if needed) PROTONIX 40mg IV   G: Glucose Control yes   B: Bowel Function Stool Occurrence: 0   I: Indwelling Catheter (Lines & [place]) Necessity [place] removed   D: De-escalation of Antimicrobials/Pharmacotherapies Vanc + Zosyn    Plan for the day/ETD Continue MICU stay    Code Status:  Family/Goals of Care: Full Code         Critical secondary to Patient has a condition that poses threat to life and bodily function: Acute Renal Failure,  Septic shock & Respiratory distress     Critical care was time spent personally by me on the following activities: development of treatment plan with patient or surrogate and bedside caregivers, discussions with consultants, evaluation of patient's response to treatment, examination of patient, ordering and performing treatments and interventions, ordering and review of laboratory studies, ordering and review of radiographic studies, pulse oximetry, re-evaluation of patient's condition. This critical care time did not overlap with that of any other provider or involve time for any procedures.     Nighat Benitez, DO  Critical Care Medicine  [facility]

## 2020-02-28 NOTE — PT/OT/SLP EVAL
Physical Therapy Evaluation    Patient Name:  Sushil Mcmahon   MRN:  [id number]    Recommendations:     Discharge Recommendations:  rehabilitation facility   Discharge Equipment Recommendations: (TBD)     Assessment:     Sushil Mcmahon is a 69 y.o. male admitted with a medical diagnosis of Decompensated HCV cirrhosis.  He presents with the following impairments/functional limitations:  weakness, impaired endurance, impaired self care skills, impaired functional mobilty, impaired balance, impaired cognition, decreased upper extremity function, decreased safety awareness, impaired cardiopulmonary response to activity.    Pt found lying in bed screaming/moaning 2nd to pain/discomfort. Pt lethargic, did not follow commands, but was able to occasionally respond appropriately to conversation. Pt's screaming/moaning decreased when EOB but still present. Unable for formally assess strength 2nd to pt not following commands. Pt with poor - fair sitting balance when EOB. Pt placed in chair position in bed once evaluation completed     Rehab Prognosis: Good; patient would benefit from acute skilled PT services to address these deficits and reach maximum level of function.    Recent Surgery: * No surgery found *      Plan:     During this hospitalization, patient to be seen 3 x/week to address the identified rehab impairments via gait training, therapeutic activities, therapeutic exercises, neuromuscular re-education and progress toward the following goals:    · Plan of Care Expires:  03/27/20    Subjective     Chief Complaint: pain   Patient/Family Comments/goals: unable to assess 2nd to impaired cognition   Pain/Comfort:  · Pain Rating 1: (penis and abdomen; unable to rate)  · Pain Addressed 1: Reposition, Distraction    Patients cultural, spiritual, Protestant conflicts given the current situation: no    Living Environment:  Pt resides with spouse in one story home with 2 ALO. Pt was independent PTA but did use rollator for long  community distance. Pt has rollator, shower chair and w/c.  Spouse reports pt has physically disabled son at home.     Objective:     Communicated with RN prior to session and wife present in room.  Patient found HOB elevated with telemetry, pulse ox (continuous), blood pressure cuff, oxygen, PICC line, central line, felix catheter  upon PT entry to room.    General Precautions: Standard, fall, sternal   Orthopedic Precautions:N/A   Braces: N/A     Exams:  · Cognitive Exam:    · Lethargic and oriented to self   · Not following commands (focused on pain vs unable?)  · Communication: minimal meaningful verbal communication; pt mostly moaning and screaming in pain   · RLE ROM: WFL  · RLE Strength: unable to formally assess 2nd to pt not following commands   · LLE ROM: WFL  · LLE Strength: unable to formally assess 2nd to pt not following commands     Functional Mobility:  · Bed Mobility:     · Scooting: total assistance and of 2 persons  · Supine to Sit: total assistance and of 2 persons  · Sit to Supine: total assistance and of 2 persons  · Transfers: not performed 2nd to pt in severe pain and pt not following commands - unsafe transfer on this date   · Gait: not performed       Therapeutic Activities and Exercises:  Educated pt on PT role/POC    Sitting EOB x 10 minutes with total A  · Pt fluctuated between severe poster R pushing and brief bouts of min A   · Worked extensively on increasing tolerance to upright posture and activity tolerance on EOB     AM-PAC 6 CLICK MOBILITY  Total Score:8     Patient left with bed in chair position with all lines intact, call button in reach, RN notified and wife present.    GOALS:   Multidisciplinary Problems     Physical Therapy Goals        Problem: Physical Therapy Goal    Goal Priority Disciplines Outcome Goal Variances Interventions   Physical Therapy Goal     PT, PT/OT Ongoing, Progressing     Description:  Goals to be met by: 3/13/2020    Patient will increase functional  independence with mobility by performin. Supine to sit with Moderate Assistance - not met  2. Sit to stand transfer with Moderate Assistance - not met  3. Gait  x 10 feet with Moderate Assistance - not met  4. Ascend/descend 2 stair with Moderate Assistance - not met  5. Sitting at edge of bed x8 minutes with Contact Guard Assistance - not met  6. Lower extremity exercise program x15 reps per handout, with independence - not met                      History:     No past medical history on file.    No past surgical history on file.    Time Tracking:     PT Received On: 20  PT Start Time: 854     PT Stop Time: 917  PT Total Time (min): 23 min     Billable Minutes: Evaluation 10 and Therapeutic Activity 8    Juliann Spann, PT, DPT  2020  [phone]

## 2020-02-28 NOTE — SUBJECTIVE & OBJECTIVE
Interval History: Underwent SLED overnight and had minimal UOP. CT did not show findings concerning for infection involving IPP.    Review of Systems  Objective:     Temp:  [95.8 °F (35.4 °C)-98.3 °F (36.8 °C)] 98.2 °F (36.8 °C)  Pulse:  [44-71] 68  Resp:  [9-28] 16  SpO2:  [86 %-99 %] 91 %  BP: ()/(42-79) 92/50     Body mass index is 29.89 kg/m².    Date 02/28/20 0700 - 02/29/20 0659   Shift 6655-0747 5553-0569 7484-5617 24 Hour Total   INTAKE   I.V.(mL/kg) 113.6(1.2)   113.6(1.2)   Shift Total(mL/kg) 113.6(1.2)   113.6(1.2)   OUTPUT   Shift Total(mL/kg)       Weight (kg) 91.8 91.8 91.8 91.8          Drains     Drain                 Urethral Catheter 02/27/20 1300 less than 1 day    Trialysis (Dialysis) Catheter 02/27/20 1820 right internal jugular less than 1 day                Physical Exam   Nursing note and vitals reviewed.  Constitutional: He appears well-developed and well-nourished. He appears distressed.   HENT:   Head: Normocephalic and atraumatic.   Eyes: Pupils are equal, round, and reactive to light. Right eye exhibits no discharge. Left eye exhibits no discharge.   Pulmonary/Chest: Effort normal.   Abdominal: Soft. He exhibits no distension. There is no tenderness.   Genitourinary:   Genitourinary Comments: The penis is circumcised with a small eschar on the left dorsal glans. The urethral meatus is normal. Bilateral testes are atrophic. The scrotum is normal in contour. IPP components are palpable in penis and scrotum in their expected position with no overlying fluctuance, induration, or erythema. Cylinders are situated away from eschar. No underlying crepitus. No guarding with exam.   Skin: Skin is warm and dry.     Jaundiced     Significant Labs:    BMP:  Recent Labs   Lab 02/27/20  1013 02/27/20 2027 02/28/20  0315     137 135* 136  136   K 4.8  4.5 5.0 4.7  4.7   CL 98  99 98 100  100   CO2 22*  21* 19* 18*  18*   *  162* 173* 89*  89*   CREATININE 5.7*  5.4* 5.7*  3.3*  3.3*   CALCIUM 10.6*  10.0 9.8 9.5  9.5       CBC:   Recent Labs   Lab 02/27/20  1013 02/27/20  1652 02/27/20  2232 02/28/20  0315   WBC 13.99*  12.80*  --  16.86* 16.50*   HGB 8.0*  8.0*  --  8.0* 8.8*   HCT 25.4*  25.2* 26* 26.6* 28.2*   PLT 71*  68*  --  74* 73*       All pertinent labs results from the past 24 hours have been reviewed.    Significant Imaging:  All pertinent imaging results/findings from the past 24 hours have been reviewed.

## 2020-02-28 NOTE — ASSESSMENT & PLAN NOTE
70 yo male with decompensated THOMAS cirrhosis here for transplant evaluation   Ascites and esophageal varices - paracentesis and banding recently   Liver US with cirrhosis and hepatic lesions (possible HCC)  Elevated PT/INR with thrombocytopenia, mild elevation of AST/ALT     MELD-Na score: 39 at 2/28/2020  1:12 PM  MELD score: 39 at 2/28/2020  1:12 PM  Calculated from:  Serum Creatinine: 3.5 mg/dL at 2/28/2020  1:12 PM  Serum Sodium: 135 mmol/L at 2/28/2020  1:12 PM  Total Bilirubin: 17.9 mg/dL at 2/28/2020  3:15 AM  INR(ratio): 2.3 at 2/28/2020  7:41 AM  Age: 69 years    Plan:  Lactulose/rifaxamine   Lactulose enema started  Possible SBP but unsafe pocket for paracentesis  Start Vancomycin and Zosyn  CT abdomen without acute infectious process  Urology consulted for penile pain, recommended removing felix catheter  Hepatology following with continue to monitor

## 2020-02-28 NOTE — ASSESSMENT & PLAN NOTE
Nutrition Problem:  Severe Protein-Calorie Malnutrition  Malnutrition in the context of Chronic Illness/Injury    Related to (etiology):  Inadequate energy intake    Signs and Symptoms (as evidenced by):  Energy Intake: <75% of estimated energy requirement for 5 months  Weight Loss: 19% x 6 months  *NFPE to be complete at time of follow-up    Interventions(treatment strategy):  Collaboration of nutrition care w/ other providers     Nutrition Diagnosis Status:  New

## 2020-02-28 NOTE — PLAN OF CARE
CM met with patient and wife, Alvarez,  at the bedside to discuss D/C POC needs. Ellie able to verify demographics in the chart are correct. CM name and contact number listed on the patient's white board.  CM provided explanation of discharge plan process. CM left blue folder at the bedside with explanation of qualification for placement and facility resources. CM remains available for any further patient needs or concerns.     Denis Fulton MD  [address] [facility] [address] / [place] [address]      [id number] - [place], [place] - [address] AT [facility] [address]  [address]  [place] [address]  Phone: [phone] Fax: [phone]      Extended Emergency Contact Information  Primary Emergency Contact: no, contact  Mobile Phone: [phone]  Relation: None  Secondary Emergency Contact: SalomeCallie webbhryn  Mobile Phone: [phone]  Relation: Spouse    Payor: MEDICARE / Plan: MEDICARE PART A & B / Product Type: [facility] /        02/28/20 1138   Discharge Assessment   Assessment Type Discharge Planning Assessment   Confirmed/corrected address and phone number on facesheet? Yes   Assessment information obtained from? Other   Prior to hospitilization cognitive status: Alert/Oriented   Prior to hospitalization functional status: Assistive Equipment   Current cognitive status: Unable to Assess   Current Functional Status: Partially Dependent;Needs Assistance   Facility Arrived From: [facility]    Lives With spouse   Able to Return to Prior Arrangements other (see comments)  (TBD)   Is patient able to care for self after discharge? Unable to determine at this time (comments)   Who are your caregiver(s) and their phone number(s)? Ellie Mcmahon, wife, [phone]   Patient currently being followed by outpatient case management? No   Patient currently receives any other outside agency services? No  (Has used [facility] in the past)   Equipment  Currently Used at Home wheelchair;rollator;shower chair   Do you have any problems affording any of your prescribed medications? No   Is the patient taking medications as prescribed? yes   Does the patient have transportation home? Yes   Transportation Anticipated family or friend will provide   Does the patient receive services at the [facility]? No   Discharge Plan A Other  (pending treatment plan  and prognosis)   Discharge Plan B Home Health   DME Needed Upon Discharge  other (see comments)  (TBD)       Garret Belcher RN MSN  Critical Care-   Ext. [phone]

## 2020-02-28 NOTE — CONSULTS
"  [facility]  Adult Nutrition  Consult Note    SUMMARY     Recommendations    1. If/when medically feasible, initiate enteral nutrition via NGT. Rec'd Novasource @ 45 mL/hr to provide 2160 kcals, 98 g of protein, 774 mL fluid.  2. If able to advance diet, recommend Renal diet (texture per SLP).   3. RD to monitor & follow-up.    Goals: Meet % EEN, EPN by RD f/u date  Nutrition Goal Status: new  Communication of RD Recs: reviewed with RN    Reason for Assessment    Reason For Assessment: consult  Diagnosis: other (see comments)(Cirrhosis)  Interdisciplinary Rounds: did not attend    General Information Comments: Pt NPO, NGT present. Aphasic/disoriented, spoke with pt's family member at bedside. Family member reports pt w/ poor appetite x 5 months & UBW ~ 250# (-19%). NFPE deferred 2/2 pt agitated, however based on PO intake PTA & 19% wt loss, pt meets criteria for severe malnutrition. Please see PES statement for details. Pt receiving CRRT.  Nutrition Discharge Planning: Unable to determine    Nutrition/Diet History    Spiritual, Cultural Beliefs, Faith Practices, Values that Affect Care: no  Factors Affecting Nutritional Intake: NPO    Anthropometrics    Temp: 97.9 °F (36.6 °C)  Height Method: Stated  Height: 5' 9" (175.3 cm)  Height (inches): 69 in  Weight Method: Bed Scale  Weight: 91.8 kg (202 lb 6.1 oz)  Weight (lb): 202.38 lb  Ideal Body Weight (IBW), Male: 160 lb  % Ideal Body Weight, Male (lb): 126.49 %  BMI (Calculated): 29.9  BMI Grade: 25 - 29.9 - overweight  Usual Body Weight (UBW), k.6 kg  % Usual Body Weight: 80.98  % Weight Change From Usual Weight: -19.19 %    Lab/Procedures/Meds    Pertinent Labs Reviewed: reviewed  Pertinent Labs Comments: BUN 89, Creat 3.3, GFR 18.2, Bili 17.9  Pertinent Medications Reviewed: reviewed  Pertinent Medications Comments: Levophed    Estimated/Assessed Needs    Weight Used For Calorie Calculations: 91.8 kg (202 lb 6.1 oz)     Energy " Calorie Requirements (kcal): 2098 kcal/d  Energy Need Method: [place]-St Jeor(1.25 PAL)     Protein Requirements:  g/d (1-1.2 g/kg)  Weight Used For Protein Calculations: 91.8 kg (202 lb 6.1 oz)     Estimated Fluid Requirement Method: other (see comments)(Per MD or 1 mL/kcal)     Nutrition Prescription Ordered    Current Diet Order: NPO    Evaluation of Received Nutrient/Fluid Intake    Comments: LBM: 2/26    Nutrition Risk    Level of Risk/Frequency of Follow-up: (2x/week)     Assessment and Plan    Severe malnutrition    Nutrition Problem:  Severe Protein-Calorie Malnutrition  Malnutrition in the context of Chronic Illness/Injury    Related to (etiology):  Inadequate energy intake    Signs and Symptoms (as evidenced by):  Energy Intake: <75% of estimated energy requirement for 5 months  Weight Loss: 19% x 6 months  *NFPE to be complete at time of follow-up    Interventions(treatment strategy):  Collaboration of nutrition care w/ other providers     Nutrition Diagnosis Status:  New     Monitor and Evaluation    Food and Nutrient Intake: energy intake, food and beverage intake, enteral nutrition intake  Food and Nutrient Adminstration: diet order, enteral and parenteral nutrition administration  Physical Activity and Function: nutrition-related ADLs and IADLs  Anthropometric Measurements: weight, weight change  Biochemical Data, Medical Tests and Procedures: glucose/endocrine profile, lipid profile, inflammatory profile, gastrointestinal profile, electrolyte and renal panel  Nutrition-Focused Physical Findings: overall appearance     Malnutrition Assessment    Weight Loss (Malnutrition): (19% x 6 months)  Energy Intake (Malnutrition): less than 75% for greater than or equal to 3 months     Nutrition Follow-Up    RD Follow-up?: Yes

## 2020-02-28 NOTE — CONSULTS
See Consult note below for a full assessment and plan.     Jessie Silverman MD   PGY-2  Hepatology  [facility]

## 2020-02-28 NOTE — PROGRESS NOTES
[facility]  Urology  Progress Note    Patient Name: Sushil Mcmahon  MRN: [id number]  Admission Date: 2/27/2020  Hospital Length of Stay: 1 days  Code Status: Full Code   Attending Provider: Surinder Mahan MD   Primary Care Physician: Primary Doctor No    Subjective:     HPI:  69 y.o. 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 History obtained from chart review and wife at the bedside as patient is encephalopathic. Patient was previously worked up with liver transplant and was not a candidate due to multiple comorbidities including multi vessel coronary artery disease which required CABG. The patient eventually underwent CABGx4 at [facility] in [place] on 2/11 which was complicated by pneumothorax, SALOMON (thought to be due to HRS and dehydration), and decompensated liver failure. Urology was consulted due to severe penile pain which has steadily worsened over the past few days. Patient has an indwelling felix which was replaced at [facility] this AM. Per wife, she is not sure why he has a felix and it has been in place since his CABG. He had a three-piece IPP placed about three years ago. No other urologic surgeries. No gross hematuria. Renal US from 2/27 showed no stones, masses, or hydronephrosis bilaterally. Felix balloon is in bladder but bladder does not appear completely decompressed. WBC is 13.99 and Cr is 5.4.    Interval History: Underwent SLED overnight and had minimal UOP. CT did not show findings concerning for infection involving IPP.    Review of Systems  Objective:     Temp:  [95.8 °F (35.4 °C)-98.3 °F (36.8 °C)] 98.2 °F (36.8 °C)  Pulse:  [44-71] 68  Resp:  [9-28] 16  SpO2:  [86 %-99 %] 91 %  BP: ()/(42-79) 92/50     Body mass index is 29.89 kg/m².    Date 02/28/20 0700 - 02/29/20 0659   Shift 8195-5175 [id number] 0905-3374 24 Hour Total    INTAKE   I.V.(mL/kg) 113.6(1.2)   113.6(1.2)   Shift Total(mL/kg) 113.6(1.2)   113.6(1.2)   OUTPUT   Shift Total(mL/kg)       Weight (kg) 91.8 91.8 91.8 91.8          Drains     Drain                 Urethral Catheter 02/27/20 1300 less than 1 day    Trialysis (Dialysis) Catheter 02/27/20 1820 right internal jugular less than 1 day                Physical Exam   Nursing note and vitals reviewed.  Constitutional: He appears well-developed and well-nourished. He appears distressed.   HENT:   Head: Normocephalic and atraumatic.   Eyes: Pupils are equal, round, and reactive to light. Right eye exhibits no discharge. Left eye exhibits no discharge.   Pulmonary/Chest: Effort normal.   Abdominal: Soft. He exhibits no distension. There is no tenderness.   Genitourinary:   Genitourinary Comments: The penis is circumcised with a small eschar on the left dorsal glans. The urethral meatus is normal. Bilateral testes are atrophic. The scrotum is normal in contour. IPP components are palpable in penis and scrotum in their expected position with no overlying fluctuance, induration, or erythema. Cylinders are situated away from eschar. No underlying crepitus. No guarding with exam.   Skin: Skin is warm and dry.     Jaundiced     Significant Labs:    BMP:  Recent Labs   Lab 02/27/20  1013 02/27/20 2027 02/28/20 0315     137 135* 136  136   K 4.8  4.5 5.0 4.7  4.7   CL 98  99 98 100  100   CO2 22*  21* 19* 18*  18*   *  162* 173* 89*  89*   CREATININE 5.7*  5.4* 5.7* 3.3*  3.3*   CALCIUM 10.6*  10.0 9.8 9.5  9.5       CBC:   Recent Labs   Lab 02/27/20  1013 02/27/20  1652 02/27/20 2232 02/28/20 0315   WBC 13.99*  12.80*  --  16.86* 16.50*   HGB 8.0*  8.0*  --  8.0* 8.8*   HCT 25.4*  25.2* 26* 26.6* 28.2*   PLT 71*  68*  --  74* 73*       All pertinent labs results from the past 24 hours have been reviewed.    Significant Imaging:  All pertinent imaging results/findings from the past 24 hours have  been reviewed.      Assessment/Plan:     Penile pain  69 y.o. 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 Urology was consulted due to penile pain.    - Recommend felix removal and voiding trial ASAP. If patient fails, bladder scan is unreliable due to ascites. Recommend clean intermittent catheterization if so.  - Difficult to fully assess penile pain as patient is encephalopathic.   - IPP components do not appear to be eroding or infected based on clinical exam and CT.  - Recommend PRN Urojet applied at the penile meatus.  - Local wound care for penile eschar.  - Trend Cr and UOP.  - Urology will sign off at this time.        VTE Risk Mitigation (From admission, onward)         Ordered     heparin (porcine) injection 5,000 Units  Every 8 hours      02/27/20 1900     IP VTE LOW RISK PATIENT  Once      02/27/20 6288                Richie Butt MD  Urology  [facility]

## 2020-02-28 NOTE — PT/OT/SLP PROGRESS
Speech Language Pathology      Sushil Mcmahon  MRN: [id number]    SLP Evaluation orders received and reviewed with MD team. Dr. Mahan confirmed Patient not appropraite for assessment.  SLP Evaluation discharged by MD team. Patient not seen today secondary to orders discharged prior to assessment  . Please re-consult when appropriate.     YOHAN Blake., [facility]  Speech-Language Pathology  Pager: [phone]  2/28/2020

## 2020-02-28 NOTE — ASSESSMENT & PLAN NOTE
70 yo male with s/p recent CABAG and decompensated cirrhosis with history of ascites and esophageal varices here for liver transplant evaluation now with acute hypoxia.     Post CABG complication with pneumothorax and pleural effusion from OSH. Pneumothorax resolved, chest tube removed at OSH.   Thoracentesis at OSH with 600 cc removed    Repeat chest xray with evidence of pulmonary edema with possible loculation on left and opacity on right. Possible ADHF vs aspiration pneumonia/pneumontitis vs hepatic hydrothorax vs volume over load from ARF    Plan:  - CXR with worsening pulmonary infiltrates, volume optimization via dialysis  - Start Vancomycin and Zosyn  - Flu negative, RIP negative  - Wean off oxygen as tolerated, currently on 12L  HFNC saturating at 91%

## 2020-02-28 NOTE — HOSPITAL COURSE
Mr. Mcmahon was admitted to MICU for acute metabolic encephalopathy, hypoxia and acute respiratory failure. Patient was started on HD for for uremia, oliguria and renal failure. CT head was ordered to evaluate for worsening encephalopathy, which was unremarkable. CT abd/pelvis revealed b/l pleural effusions and a cirrhotic liver with prominent lymph nodes. Patient was started on broad spectrum antibiotics to cover for SBP & PNA and septic shock. He was also started on Norepinephrine to maintain MAPs of 65. Patient also started on lactulose enema's for hepatic encephalopathy. Patient with no improvement in mental status despite having bowel movements on the enema. He was started on Precedex for agitation. Patient with worsening liver function 2/29, added on Vasopressin for MAP goal of 85. Patient still with worsening liver function, having paroxysmal atrial fibrillation. Metoprolol 5 mg IV x 3 ordered for HR sustained in 150's, subsequent bradycardia to the 40's. Amiodarone ggt started for rate control. Discussion with wife on goals of care, she stated she will like to give him till Tuesday, 3/3/2020 before making a final decision on withdrawing care. Pt placed on 24hr EEG monitoring. Patient now requiring 3 vasopressors to maintain blood pressure. Discussed with wife about patients declining condition, she expressed understanding. Family arrived at bedside. Called to bedside by patient's nurse for asystole. Time of death 16:07pm.

## 2020-02-28 NOTE — PROCEDURES
"Sushil Mcmahon is a 69 y.o. male patient.    Temp: 96.5 °F (35.8 °C) (02/27/20 1543)  Pulse: (!) 47 (02/27/20 1800)  Resp: 18 (02/27/20 1800)  BP: (!) 102/53 (02/27/20 1800)  SpO2: (!) 93 % (02/27/20 1800)  Weight: 91.8 kg (202 lb 6.1 oz) (02/27/20 1543)  Height: 5' 9" (175.3 cm) (02/27/20 1543)       Central Line  Date/Time: 2/27/2020 6:36 PM  Location procedure was performed: [facility] ([facility])  Performed by: Nighat Benitez DO  Pre-operative Diagnosis: Acute Renal Failure  Post-operative diagnosis: Acute Renal Failure  Consent Done: Yes  Time out: Immediately prior to procedure a "time out" was called to verify the correct patient, procedure, equipment, support staff and site/side marked as required.  Indications: hemodialysis  Anesthesia: local infiltration    Anesthesia:  Local anesthesia used: yes  Local Anesthetic: lidocaine 1% with epinephrine  Preparation: skin prepped with betadine  Skin prep agent dried: skin prep agent completely dried prior to procedure  Location details: right internal jugular  Catheter type: trialysis  Number of attempts: 1  Assessment: placement verified by x-ray and successful placement  Complications: none  Estimated blood loss (mL): 10  Specimens: No  Post-procedure: line sutured,  chlorhexidine patch,  sterile dressing applied and blood return through all ports  Complications: No          Nighat Benitez  2/27/2020  " Telephone Encounter by Katelyn Lopez CNA at 09/15/17 10:31 AM     Author:  Katelyn Lopez CNA Service:  (none) Author Type:  Certified Nursing Assistant     Filed:  09/15/17 10:45 AM Encounter Date:  9/14/2017 Status:  Signed     :  Katelyn Lopez CNA (Certified Nursing Assistant)            Pt was seen on 09/14  US guided left and right lobe liver biopsy order faxed to . Pt given number to call in 3-5 days.     Awaiting MD OV note to fax to central scheduling and pathology[MS1.1M]   Electronically Signed by:    Katelyn Lopez CNA , 9/15/2017[MS1.2T]      Revision History        User Key Date/Time User Provider Type Action    > MS1.2 09/15/17 10:45 AM Katelyn Lopez CNA Certified Nursing Assistant Sign     MS1.1 09/15/17 10:31 AM Katelyn Lopez CNA Certified Nursing Assistant     M - Manual, T - Template

## 2020-02-28 NOTE — CONSULTS
Wound care consulted for multiple wounds all over body  PMH:   Cirrhosis/liver failure due to THOMAS, hepatic encephalopathy, variceal bleeding, diabetes, hypothyroidism, anxiety/depression, hypotension, A-fib, CABG (2/11/2020), acute respiratory failure with hypoxia, SALOMON, thrombocytopenia  Assessment:  The bilateral arms have multiple skin tears reported with standing orders for skin tears treatment in place by nursing.  The mid sternum/chest has an incision with a hydrocolloid surgical dressing in place, approximated with scant-small amount of serosanguineous drainage around zyphoid.    The gluteal cleft and right buttocks have full thickness wounds from shearing/moisture.  The wound beds are red/moist with irregular wound edges and discolored pigmentation to the john-wound.   The left posterior leg has diffuse red/purple discoloration, skin intact.   The left penal head has a scabbed over wound bed.   Pressure prevention measures in place. Patient moves in bed frequently, HOB elevated above 30 degrees for respiratory issues.   Recommendations:  Triad ointment to the buttocks/penis BID/prn cleansing  Venelex ointment BID to discolored areas to the left posterior leg  Immerse mattress with evolution bed  Skin tear standing orders for arms.   Discussed with Dr. Mahan, approved orders.  Nursing to continue care, wound care will follow-up prn  SILVIA Wagner RN, [facility]  [id number]   mid sternum/chest    Right buttock/gluteal cleft

## 2020-02-28 NOTE — PROGRESS NOTES
"Transplant Recipient Adult Psychosocial Assessment (Inpatient eval)    Psychosocial was completed with pt's caregiver, due to pt in pain from recent OR procedure in the morning.     Sushil Mcmahon  [address] [place] [address]  Telephone Information:   Mobile [phone]   Home  [phone] (home)  Work  There is no work phone number on file.  E-mail  No e-mail address on record    Sex: male  YOB: 1950  Age: 69 y.o.    Encounter Date: 2020  U[place] Citizen: yes  Primary Language: English   Needed: no    Emergency Contact:  Name: Ellie Mcmahon  Relationship: wife  Address: [address] [address]  Phone Numbers:  [phone](cell)    Family/Social Support:   Number of dependents/: None.   Marital history: Pt's wife reported has been  currently to pt for 4 years. Pt's wife stated pt was  before and added his late wife had a liver transplant and passed away 7.5 years ago due to liver.   Other family dynamics: Pt's wife reported pt has 3 adult children, 2 are , and 1 is "handicapp and lives in a home in [place]". Pt's wife stated pt's parents are . Pt's wife stated pt has 2 siblings, "pt's sister has had a kidney and liver transplant in 2019 and pt's brother has had liver transplant end of 2019". Pt's wife also reported they do have a pet dog at home(pt's dog belongs to pt's son,however pet dog lives with pt).     Household Composition:  Name: Ellie Mcmahon   Relationship: wife  Does person drive? yes    Name: Sushil Mcmahon  Age: 69  Relationship: patient  Does person drive? no. Pt's wife reported pt has not driven in the last 3 months.     Do you and your caregivers have access to reliable transportation? yes  PRIMARY CAREGIVER: Ellie Mcmahon will be primary caregiver, phone number [phone]. Pt's primary caregiver verbally confirmed will be the primary caregiver and does not have any prior committment or " obligations that would conflict with her being pt's primary caregiver. Pt's wife is currently retired, Pt's wife reported started alf last week.     SECONDARY CAREGIVER: Daria Renee reported to potentially be the secondary caregiver, Pt's wife stated will need to contact and discuss with pt's daughter first, before SW confirms with secondary caregiver and discusses caregiver roles with. Potential secondary caregiver was reported works full time as , however will utilize leave. Pt's daughter does have 2 children, however, pt's wife stated pt's daughter does have  support from family, if needed. Phone: [phone] (uncomfirmed)     provided in-depth information to patient and caregiver regarding pre- and post-transplant caregiver role.   strongly encourages patient and caregiver to have concrete plan regarding post-transplant care giving, including back-up caregiver(s) to ensure care giving needs are met as needed.    Caregiver states understanding all aspects of caregiver role/commitment and is able/willing/committed to being caregiver to the fullest extent necessary.    Caregiver verbalizes understanding of the education provided today and caregiver responsibilities.         remains available. Caregiver agree to contact  in a timely manner if concerns arise.      Able to take time off work without financial concerns: yes.     Additional Significant Others who will Assist with Transplant:  Name: Jarett Mcmahon ([phone])  City: [place] State: [place]  Relationship: son  Does person drive? yes    Living Will: no  Healthcare Power of : no  Advance Directives on file: <<no information> per medical record.  Verbally reviewed LW/HCPA information.   provided patient with copy of LW/HCPA documents and provided education on completion of forms.    Living Donors: No.    Highest Education Level: Associate Degree  Reading  "Ability: college  Reports difficulty with: seeing and hearing. Pt's wife reported pt has difficulty with vision and has prescription glasses. Pt's wife reported pt has tinnitus, thus has difficulty with hearing.   Learns Best By:  Hands on.      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   VA Benefits: yes     Working for Income: No  If no, reason not working: Patient Choice - Retired  Patient is retired from  at [employer]. Prior to prison, pt was employed as a  for 20 years. After retiring from the [employer], pt worked as a  from  - 2019.     Spouse/Significant Other Employment: Pt's wife reported was working a banker before prison. Pt's wife reported "retired last week". 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     Disabled: no    Monthly Income:  Pt's detention: $0846-4006 Pt's wife detention: $2,000.  Able to afford all costs now and if transplanted, including medications: yes  Caregiver verbalizes understanding of personal responsibilities related to transplant costs and the importance of having a financial plan to ensure that patients transplant costs are fully covered.      provided fundraising information/education.  Caregiver verbalizes understanding.   remains available.    Insurance:   Payor/Plan Subscr  Sex Relation Sub. Ins. ID Effective Group Num   1. MEDICARE - MEKIMBERLY RODRIGUEZ 1950 Male Self [id number]                                    [address]   2. BLUE CROSS * KIMBERLY CHEN 1950 Male  [id number]                                   [address] [address]     Primary Insurance (for UNOS reporting): Public Insurance - Medicare FFS (Fee For Service)  Secondary Insurance (for UNOS reporting): Private Insurance (Blue Cross Blue Shield)  Caregiver " "verbalizes clear understanding that patient may experience difficulty obtaining and/or be denied insurance coverage post-surgery. This includes and is not limited to disability insurance, life insurance, health insurance, burial insurance, long term care insurance, and other insurances.    Caregiver also reports understanding that future health concerns related to or unrelated to transplantation may not be covered by patient's insurance.  Resources and information provided and reviewed.      Caregiver provides verbal permission to release any necessary information to outside resources for patient care and discharge planning.  Resources and information provided are reviewed.      Dialysis Adherence:  Caregiver reports pt has not had any dialysis in the past, however has dialysis "last night in the hospital".     Infusion Service: patient utilizing? no  Home Health: patient utilizing? yes. Pt's wife reported does recall Home Health name. Services: Nurse and PT.   DME: yes. Pt's wife reported has a wheelchair, rollator, and shower chair.   Pulmonary/Cardiac Rehab: Pt's wife denies pt having any pulmonary/cardiac rehab in the past or currently.    ADLS:  Pt's wife reports pt is independent with ADLS. Pt's wife reported pt has not had difficulty getting around the home and doing daily tasks on his own.     Adherence:   Pt's wife reported pt does follow-up with medical appointments and takes his medication as needed.  Adherence education and counseling provided.     Per History Section:  No past medical history on file.  Social History     Tobacco Use    Smoking status: Not on file   Substance Use Topics    Alcohol use: Not on file     Social History     Substance and Sexual Activity   Drug Use Not on file     Social History     Substance and Sexual Activity   Sexual Activity Not on file       Per Today's Psychosocial:  Tobacco: none, patient denies any use. Pt's wife reported pt quit smoking 40 years ago and stated was " ""probably smoking 1 ppd".   Alcohol: none, patient denies any use. Pt's wife reported pt would have 1-2 beers a year.   Illicit Drugs/Non-prescribed Medications: none, patient denies any use.    Caregiver states clear understanding of the potential impact of substance use as it relates to transplant candidacy and is aware of possible random substance screening.  Substance abstinence/cessation counseling, education and resources provided and reviewed.     Arrests/DWI/Treatment/Rehab: patient denies    Psychiatric History:    Mental Health: depression, Pt's wife reported pt was prescribed Prexaton for depression, after his sister's surgery. Pt's wife stated pt's gastrologist prescribed the Prexaton.   Psychiatrist/Counselor: Pt's wife denies pt having a psychiatrist or counselor in the past or currently.   Medications:  Prexaton.   Suicide/Homicide Issues: Pt's wife denies pt having any suicidal or homicidal thoughts in the past or currently.    Safety at home: Pt's wife reported pt does live in a safe space and safe environment at home.     Knowledge: Caregiver states having clear understanding and realistic expectations regarding the potential risks and potential benefits of organ transplantation and organ donation, agrees to discuss with health care team members and support system members and to utilize available resources and express questions and/or concerns in order to further facilitate the pt informed decision-making.  Resources and information provided and reviewed.     Caregiver is aware of [facility]'s affiliation and/or partnership with agencies in home health care, LTAC, SNF, [facility] and other hospitals and clinics.    Understanding: Caregiver reports having a clear understanding of the many lifetime commitments involved with being a transplant recipient, including costs, compliance, medications, lab work, procedures, appointments, concrete and financial planning, preparedness, timely and appropriate " "communication of concerns, abstinence (ETOH, tobacco, illicit non-prescribed drugs), adherence to all health care team recommendations, support system and caregiver involvement, appropriate and timely resource utilization and follow-through, mental health counseling as needed/recommended, and patient and caregiver responsibilities.  Social Service Handbook, resources and detailed educational information provided and reviewed.  Educational information provided.    Caregiver also reports current and expected compliance with health care regime and states having a clear understanding of the importance of compliance.      Caregiver reports a clear understanding that risks and benefits may be involved with organ transplantation and with organ donation.      Caregiver also reports clear understanding that psychosocial risk factors may affect patient, and include but are not limited to feelings of depression, generalized anxiety, anxiety regarding dependence on others, post traumatic stress disorder, feelings of guilt and other emotional and/or mental concerns, and/or exacerbation of existing mental health concerns.  Detailed resources provided and discussed.     Caregiver agrees to access appropriate resources in a timely manner as needed and/or as recommended, and to communicate concerns appropriately.  Caregiver also reports a clear understanding of treatment options available.      reviewed education, provided additional information, and answered questions.    Feelings or Concerns: When SW inquired what are pt's feelings and concerns about transplant. Pt's wife reported wanting the transplant and for pt to get better. When SW inquired if pt has mentioned any concerns or feelings about transplant to pt's wife, pt wife stated "he's been wanting transplant".     Coping: Pt's wife reported "coping okay" and added "whispers prayers" to cope. SW informed pt's wife hospital Chaplins are available for spiritual " "support, pt's wife stated "[facility] myah came by this morning".     Identify Patient Strategies to [place]:   1. Currently & Pre-transplant - Pt's wife stated before hospitalization, "pt enjoys shopping, using his tractor, working on the yard, always busy doing something, camping, and visiting friends."   2. At the time of surgery - Pt's wife stated at the time of surgery and hospital stay, pt will have social support from family and watch TV.    3. During post-Transplant & Recovery Period - Pt's wife stated, pt will have social support from family.     Goals: Pt's wife stated pt has expressed to her going camping again to [place] when physically/medically well again. Pt's wife reported pt enjoys camping and is a future goal. Pt's wife added pt has a 31 foot camper. Patient referred to Vocational Rehabilitation.    Interview Behavior: Caregiver presents as alert and oriented x 4, pleasant, good eye contact, well groomed, recall good, concentration/judgement good, average intelligence, calm, communicative, cooperative and asking and answering questions appropriately.          Transplant Social Work - Candidacy  Assessment/Plan:     Psychosocial Suitability: Patient presents as a suitable candidate for liver transplant at this time. Based on psychosocial risk factors, patient presents as high risk, due to unconfirmed secondary caregiver. Pt's wife reported pt's daughter potentially being the secondary caregiver, however needs to discuss and confirm with pt's daughter first, before SW contacts pt's daughter to confirm caregiver and provide education on caregiver roles. At the event, Secondary caregiver is confirmed, pt will be deemed from high risk due to low risk due to no other psychosocial concerns: Pt's caregiver reported pt having adequate insurances, finances, great social support from family and friends, no substance abuse concerns, nor mental health concerns. Pt's wife added Jain has been "assisting with " "fundraising, however fundraising money was deposited into checking account", SW informed pt to start an account only for fundraising and advised not to use personal for fundraising purposes. Pt confirmed understanding and verbalized will start an account for fundraising. SW providing ongoing psychosocial and counseling support, education, resources, assistance. Following and available.     Recommendations/Additional Comments:   -Advance Directives  -Support Group  -Fundraising    Hollie Doss LMSW         "

## 2020-02-28 NOTE — PROGRESS NOTES
Enema administered and retained for 1 hour via flexi per MD, 100 ml stool in flexi after 1 hour retention

## 2020-02-28 NOTE — NURSING
Pt noted to be very lethargic with focal neuro changes at bed side report. Further assessment revealed hypotensive vitals and temp was 95.8F. Pt started back on ordered levo and warming blanket temp placed on 43C. Team notified of findings. Stat head ct ordered. I will continue to monitor closely.

## 2020-02-28 NOTE — PROGRESS NOTES
[facility]  Nephrology  Progress Note    Patient Name: Sushil Mcmahon  MRN: [id number]  Admission Date: 2/27/2020  Hospital Length of Stay: 1 days  Attending Provider: Surinder Mahan MD   Primary Care Physician: Primary Doctor No  Principal Problem:Decompensated HCV cirrhosis    Subjective:     HPI: This is a 70 y/o male with THOMAS cirrhosis (complicated by varices, ascites requiring weekly paracentesis, hepatic encephalopathy), a-fib, DM, hypothyroidism, CAD s/p recent CABG 2/11/20 who was transferred [facility] [place]. He had been evaluated for liver TXP in [place] and sought second opinion at [facility], but found to have multivessel CAD in his work-up and initially deemed not amenable for PTCA and too high risk for CABG. Eventually saw Dr. Biggs (CT SURGERY) in [place] and underwent CABG 2/11/20 complicated post-operatively by PTX, decompensated liver failure, SALOMON - he has never left the hospital since that surgery.    Per his wife present at bedside, his mental status has been waxing and waning and he has been very weak since surgery. Currently he is very somnolent and arouses to questions briefly. Given Narcan on the floor after dose of Dilaudid and transferred to ICU this afternoon for worsening respiratory status.    NEPHROLOGY is consulted for SALOMON in setting of decompensated cirrhosis.    Interval History:  Critically ill - on Levophed @ 0.08 mcg/hr. CTH overnight for worsened mental status - no acute findings. CT AP for abd pain - no acute findings. Groaning/complaining of abdominal pain. Remains disoriented.    Review of patient's allergies indicates:   Allergen Reactions    Lisinopril      Current Facility-Administered Medications   Medication Frequency    0.9%  NaCl infusion (CRRT USE ONLY) Continuous    acetaminophen tablet 650 mg Q6H PRN    albuterol-ipratropium 2.5 mg-0.5 mg/3 mL nebulizer solution 3 mL Q4H  WAKE    albuterol-ipratropium 2.5 mg-0.5 mg/3 mL nebulizer solution 3 mL Q6H PRN    atropine injection 0.5 mg PRN    azithromycin 500 mg in dextrose 5 % 250 mL IVPB (ready to mix system) Q24H    cefTRIAXone (ROCEPHIN) 2 g in dextrose 5 % 50 mL IVPB Q24H    dextrose 10% (D10W) Bolus PRN    dextrose 10% (D10W) Bolus PRN    glucagon (human recombinant) injection 1 mg PRN    glucose chewable tablet 16 g PRN    glucose chewable tablet 24 g PRN    heparin (porcine) injection 5,000 Units Q8H    hyoscyamine 0.125 mg/mL solution 0.125 mg TID    lactulose 20 gram/30 mL solution Soln 30 g TID    lidocaine HCl 2% urojet BID    magnesium sulfate 2g in water 50mL IVPB (premix) PRN    midodrine tablet 15 mg TID    norepinephrine 4 mg in dextrose 5% 250 mL infusion (premix) (titrating) Continuous    octreotide injection 100 mcg Q8H    ondansetron injection 4 mg Q8H PRN    pantoprazole injection 40 mg Daily    rifAXIMin tablet 550 mg BID    sodium chloride 0.9% flush 10 mL PRN    sodium phosphate 20.01 mmol in dextrose 5 % 250 mL IVPB PRN    sodium phosphate 30 mmol in dextrose 5 % 250 mL IVPB PRN    sodium phosphate 39.99 mmol in dextrose 5 % 250 mL IVPB PRN    vancomycin - pharmacy to dose pharmacy to manage frequency    vancomycin 1.5 g in dextrose 5 % 250 mL IVPB (ready to mix) Once    zinc sulfate capsule 220 mg Daily       Objective:     Vital Signs (Most Recent):  Temp: 98.2 °F (36.8 °C) (02/28/20 0700)  Pulse: (!) 54 (02/28/20 0900)  Resp: 16 (02/28/20 0900)  BP: (!) 102/54 (02/28/20 0900)  SpO2: (!) 90 % (02/28/20 0900)  O2 Device (Oxygen Therapy): High Flow nasal Cannula (02/28/20 0900) Vital Signs (24h Range):  Temp:  [95.8 °F (35.4 °C)-98.3 °F (36.8 °C)] 98.2 °F (36.8 °C)  Pulse:  [44-71] 54  Resp:  [9-28] 16  SpO2:  [86 %-99 %] 90 %  BP: ()/(42-79) 102/54     Weight: 91.8 kg (202 lb 6.1 oz) (02/27/20 1543)  Body mass index is 29.89 kg/m².  Body surface area is 2.11 meters squared.    I/O  last 3 completed shifts:  In: 1697.7 [I.V.:1697.7]  Out: 1944 [Urine:2; Other:1942]    Physical Exam   Constitutional: He appears well-developed and well-nourished. No distress.   HENT:   Head: Normocephalic and atraumatic.   NGT in place   Eyes: Scleral icterus is present.   Cardiovascular: Regular rhythm. Exam reveals no gallop and no friction rub.   No murmur heard.  Bradycardia  L arm PICC in place   Pulmonary/Chest: Effort normal. No stridor. No respiratory distress. He has no wheezes. He has rales.   On 12 L HF-NC   Abdominal: Soft. He exhibits distension. There is tenderness.   Genitourinary:   Genitourinary Comments: [place] with minimal urine in bag   Musculoskeletal: He exhibits edema (BLE - pitting).   Neurological:   Somnolent, arouses to voice   Skin: He is not diaphoretic.   Multiple areas of ecchymosis, most notably to R arm  Grossly jaundiced     Significant Labs:  CBC:   Recent Labs   Lab 02/28/20  0315   WBC 16.50*   RBC 2.70*   HGB 8.8*   HCT 28.2*   PLT 73*   *   MCH 32.6*   MCHC 31.2*     CMP:   Recent Labs   Lab 02/28/20  0315   GLU 49*  49*   CALCIUM 9.5  9.5   ALBUMIN 4.1  4.1   PROT 6.8     136   K 4.7  4.7   CO2 18*  18*     100   BUN 89*  89*   CREATININE 3.3*  3.3*   ALKPHOS 242*   *   *   BILITOT 17.9*     Coagulation:   Recent Labs   Lab 02/28/20  0741   INR 2.3*      Significant Imaging:  X-Ray: Reviewed  CT: Reviewed    Assessment/Plan:     SALOMON (acute kidney injury)  This is a 68 y/o male with THOMAS cirrhosis (complicated by varices, ascites requiring weekly paracentesis, hepatic encephalopathy), a-fib, DM, hypothyroidism, CAD s/p recent CABG 2/11/20 who was transferred from [facility] to [facility] [place]. He had been evaluated for liver TXP in [place] and sought second opinion at [facility], but found to have multivessel CAD in his work-up and initially deemed not amenable for PTCA and too high risk  for CABG. Eventually saw Dr. Biggs (CT SURGERY) in [place] and underwent CABG 2/11/20 complicated post-operatively by PTX, decompensated liver failure, SALOMON - he has never left the hospital since that surgery. Baseline kidney function appears normal based on [facility] labs from Jan 2020 (BUN 16/1.1). On exam, he is bradycardic and somnolent. B rales throughout both lungs and LE edema. /Cr 5.4. Severely oliguric.    MELD-Na score: 38 at 2/28/2020  7:41 AM  MELD score: 38 at 2/28/2020  7:41 AM  Calculated from:  Serum Creatinine: 3.3 mg/dL at 2/28/2020  3:15 AM  Serum Sodium: 136 mmol/L at 2/28/2020  3:15 AM  Total Bilirubin: 17.9 mg/dL at 2/28/2020  3:15 AM  INR(ratio): 2.3 at 2/28/2020  7:41 AM  Age: 69 years    Overall, appears to be in renal failure with oliguria. Unclear etiology - likely secondary to ATN s/p CABG 2/11/20. Possible HRS given decompensated cirrhosis. Also with uremia vs. hepatic encephalopathy (ammonia 57).    RECOMMENDATIONS:  - initiated on SLED yesterday evening - tolerated 6 hours  - avoid nephrotoxins  - renally dose medications  - renal diet when eating  - closely following      Thank you for your consult. I will follow-up with patient. Please contact us if you have any additional questions.    Tramaine Mills MD  Nephrology  [facility]

## 2020-02-29 NOTE — ASSESSMENT & PLAN NOTE
Unknown etiology at this time, suspect SBP vs PNA. Currently requiring vasopressor support (Levo + Vaso )  Lactic acid trend 3.6  - 6.2, . WBC increase 16.5 to 18.20  Blood cultures NGTD  Started on Vancomycin and Zosyn

## 2020-02-29 NOTE — CONSULTS
Palliative consult received. Checked in with primary team. Our help is not needed at this time. Please re-consult if we can be of assistance in the future.

## 2020-02-29 NOTE — PROGRESS NOTES
[facility]  Critical Care Medicine  Progress Note    Patient Name: Sushil Mcmahon  MRN: [id number]  Admission Date: 2/27/2020  Hospital Length of Stay: 2 days  Code Status: DNR  Attending Provider: Surinder Mahan MD  Primary Care Provider: Denis Fulton MD   Principal Problem: Decompensated HCV cirrhosis    Subjective:     HPI:  Mr. Mcmahon is a 68yo male with cirrhosis/liver failure due to TOHMAS, history of hepatic encephalopathy, variceal bleeding, diabetes, hypothyroidism, anxiety/depression, hypotension, A-fib, recent CABG 2/11/2020 who presents as a transfer from [facility] in [place] for liver transplant evaluation. Per notes, patient was previously worked up with liver transplant but was not a candidate due to multiple comorbidities including multi vessel coronary artery disease which required CABG. He was deemed high risk due to multiple comorbidities, also turned down at [facility] where he presented for a second opinion. He eventually went to [facility] were he got a 4- vessel CABG on 2/11. The procedure was complicated by pneumothorax, SALOMON and decompensated liver failure with (MELD 30). He was stabilized and transferred to [facility] for transplant hepatology evaluation, however he was denied candidacy. Patient was then transferred to [facility] 2/27 for higher level of care and liver transplant evaluation. Critical care consulted for hypoxia, encephalopathy and acute renal failure.    On evaluation, patient was encephalopathic and  appeared in distress 2/2 to pain, saturating 86% in 15L n/c. Per wife, patient has been experiencing worse pain in the past few days. The pain is nonspecifically located in his groin/pelvic region. He was also reported to be anuric since arrival at with [facility]. Labs significant for WBC 12.80, HGB 8.0, PLT 6.8, BUN/Cr 162/5.4, lactic acid of 2.5. Patient was given a dose of dilaudid on the floor, after  which he became difficult to arouse, this was subsequently treated with Narcan and patient responded appropriately.     Hospital/ICU Course:  Mr. Mcmahon was admitted to MICU for acute metabolic encephalopathy, hypoxia and acute respiratory failure. Patient was started on HD for for uremia, oliguria and renal failure. CT head was ordered to evaluate for worsening encephalopathy, which was unremarkable. CT abd/pelvis revealed b/l pleural effusions and a cirrhotic liver with prominent lymph nodes. Patient was started on broad spectrum antibiotics to cover for SBP & PNA and septic shock. He was also started on Norepinephrine to maintain MAPs of 65. Patient also started on lactulose enema's for hepatic encephalopathy. Patient with no improvement in mental status despite having bowel movements on the enema. He was started on Precedex for agitation. Patient with worsening liver function 2/29, added on Vasopressin for MAP goal of 85.    Interval History/Significant Events: Patient with agitation overnight so he was started on Precedex. He was also made DNR overnight. Patient with worsening liver function today, plan to increase MAP goal to 85, to increase perfusion to the liver.    Review of Systems   Unable to perform ROS: Mental status change     Objective:     Vital Signs (Most Recent):  Temp: 97.9 °F (36.6 °C) (02/29/20 1101)  Pulse: (!) 114 (02/29/20 1300)  Resp: 13 (02/29/20 1300)  BP: 100/67 (02/29/20 1300)  SpO2: (!) 93 % (02/29/20 1300) Vital Signs (24h Range):  Temp:  [96.5 °F (35.8 °C)-98.2 °F (36.8 °C)] 97.9 °F (36.6 °C)  Pulse:  [] 114  Resp:  [11-32] 13  SpO2:  [92 %-100 %] 93 %  BP: ()/(43-67) 100/67   Weight: 91.5 kg (201 lb 13 oz)  Body mass index is 29.8 kg/m².      Intake/Output Summary (Last 24 hours) at 2/29/2020 1314  Last data filed at 2/29/2020 1300  Gross per 24 hour   Intake 6349.53 ml   Output 7434 ml   Net -1084.47 ml       Physical Exam   Constitutional: He appears well-developed. No  distress.   HENT:   Head: Normocephalic and atraumatic.   Eyes: Scleral icterus is present.   Cardiovascular: Intact distal pulses. Bradycardia present.   Clean dressing located across central chest from recent CABG . No redness or drainage around the area   Pulmonary/Chest: He is in respiratory distress. He has wheezes. He has rales.   R. IJ  in place   Abdominal: Soft. He exhibits no mass. There is tenderness (lower quadrant and groin).   Musculoskeletal: He exhibits no tenderness.   Neurological: He is alert. He is disoriented.   Skin: Skin is warm. There is erythema (Ecchymosis in R. lower extremity).   Psychiatric: He is agitated.       Vents:     Lines/Drains/Airways     Peripherally Inserted Central Catheter Line            PICC Double Lumen left brachial -- days          Central Venous Catheter Line            Trialysis (Dialysis) Catheter 02/27/20 1820 right internal jugular 1 day          Drain                 NG/OG Tube 02/28/20 0700 Right nostril 1 day         Fecal Incontinence  02/29/20 0300 less than 1 day              Significant Labs:    CBC/Anemia Profile:  Recent Labs   Lab 02/27/20  2232 02/28/20  0315 02/29/20  0331   WBC 16.86* 16.50* 18.20*   HGB 8.0* 8.8* 7.8*   HCT 26.6* 28.2* 25.8*   PLT 74* 73* 39*   * 104* 107*   RDW 23.7* 23.8* 24.9*   IRON  --  77  --    FERRITIN  --  4,865*  --         Chemistries:  Recent Labs   Lab 02/28/20  0315 02/28/20  1312 02/29/20  0331     136 135* 139  139   K 4.7  4.7 4.9 4.4  4.4     100 101 104  104   CO2 18*  18* 18* 20*  20*   BUN 89*  89* 77* 21  21   CREATININE 3.3*  3.3* 3.5* 1.5*  1.5*   CALCIUM 9.5  9.5 9.1 8.5*  8.5*   ALBUMIN 4.1  4.1 3.5 3.3*  3.3*   PROT 6.8  --  5.8*   BILITOT 17.9*  --  18.4*   ALKPHOS 242*  --  231*   *  --  227*   *  --  774*   MG 2.3 2.1 2.0   PHOS 4.3  4.3 5.0* 4.2  4.2       Lactic Acid:   Recent Labs   Lab 02/28/20  1431 02/29/20  0331 02/29/20  1028   LACTATE  5.3* 3.6* 6.2*       Significant Imaging:  I have reviewed all pertinent imaging results/findings within the past 24 hours.      ABG  Recent Labs   Lab 02/27/20 2034   PH 7.237*   PO2 47   PCO2 54.8*   HCO3 23.3*   BE -4     Assessment/Plan:     Pulmonary  Acute respiratory failure with hypoxia  70 yo male with s/p recent CABAG and decompensated cirrhosis with history of ascites and esophageal varices here for liver transplant evaluation now with acute hypoxia.     Post CABG complication with pneumothorax and pleural effusion from OSH. Pneumothorax resolved, chest tube removed at OSH.   Thoracentesis at OSH with 600 cc removed    Repeat chest xray with evidence of pulmonary edema with possible loculation on left and opacity on right. Possible ADHF vs aspiration pneumonia/pneumontitis vs hepatic hydrothorax vs volume over load from ARF    Plan:  - CXR with worsening pulmonary infiltrates, volume optimization via dialysis. CXR 2/29 mildy improved but still with b/l pulmonary infiltrates  - Continue Vancomycin and Zosyn  - Flu negative, RIP negative  - Wean off oxygen as tolerated, currently on 12L  HFNC saturating at 91%         Cardiac/Vascular  Coronary artery disease involving coronary bypass graft of native heart with angina pectoris  CABG in Feb, 2020 with post-op complication of pneumothorax and pleural effusion   Bradycardia on the floor  Chest xray with pulmonary edema   EKG with sinus bradycardia  Troponin with flat trend  2D ECHO: EF 65%, no wall motion abnormalities, indeterminate L.V diastolic dysfunction  Added Vasopressin to maintain MAP goal of 85    Renal/  Penile pain  Urology consulted, recommended felix to be removed    SALOMON (acute kidney injury)  Decompensated cirrhosis with anuric SALOMON, sCr 5.4, unclear baseline however sCr was 1.0 in January, 2020. Possible HRS vs ATN  Renal US without acute abnormalities   Nephrology Consult ,Continue SLED  BUN improved , now 21  Still anuric    ID  Septic  shock  Unknown etiology at this time, suspect SBP vs PNA. Currently requiring vasopressor support (Levo + Vaso )  Lactic acid trend 3.6  - 6.2, . WBC increase 16.5 to 18.20  Blood cultures NGTD  Started on Vancomycin and Zosyn      Hematology  Thrombocytopenia  Severe thrombocytopenia 39. Likely secondary to cirrhosis, 120 in Jan, 2020  Daily CBC   Hold Heparin 5K Q8H      Endocrine  Severe malnutrition  Started on tube feeds    GI  * decompensated cirrhosis   68 yo male with decompensated THOMAS cirrhosis here for transplant evaluation   Ascites and esophageal varices - paracentesis and banding recently   Liver US with cirrhosis and hepatic lesions (possible HCC)  Elevated PT/INR with thrombocytopenia, mild elevation of AST/ALT     MELD-Na score: 33 at 2/29/2020  3:31 AM  MELD score: 33 at 2/29/2020  3:31 AM  Calculated from:  Serum Creatinine: 1.5 mg/dL at 2/29/2020  3:31 AM  Serum Sodium: 139 mmol/L (Rounded to 137 mmol/L) at 2/29/2020  3:31 AM  Total Bilirubin: 18.4 mg/dL at 2/29/2020  3:31 AM  INR(ratio): 2.9 at 2/29/2020  3:31 AM  Age: 69 years    Plan:  Lactulose/rifaxamine   Lactulose enema started  Possible SBP but unsafe pocket for paracentesis. IR can do paracentesis on Monday, if needed  Continue Vancomycin and Zosyn  CT abdomen without acute infectious process  Hepatology following with continue to monitor. Not a liver transplant candidate at this time due to hemodynamic instablitiy  Worsening AST//227, INR 2.9, TBILI 18.4. Will increase MAP goal to 85 to increase perfusion to kidneys.       Abdominal pain  Severe abdominal pain in the suprapubic and penile region. Unclear why. Abdo compartment syndrome? Bladder unremarkable on scan. SBP?  CT abdo WO contrast with findings of cirrhotic liver and prominent lymph nodes  Possible SBP but unsafe pocket for paracentesis. IR can do paracentesis on weekday  Will treat with Vancomycin and Zosyn       Critical Care Daily Checklist:    A: Awake: RASS  Goal/Actual Goal: RASS Goal: 0-->alert and calm  Actual: Salguero Agitation Sedation Scale (RASS): Light sedation   B: Spontaneous Breathing Trial Performed?     C: SAT & SBT Coordinated?  N/A                  D: Delirium: CAM-ICU Overall CAM-ICU: Positive   E: Early Mobility Performed? No   F: Feeding Goal: Goals: Meet % EEN, EPN by RD f/u date  Status: Nutrition Goal Status: new   Current Diet Order   No orders of the defined types were placed in this encounter.      AS: Analgesia/Sedation PRECEDEX   T: Thromboembolic Prophylaxis Hold for PLT < 30   H: HOB > 300 Yes   U: Stress Ulcer Prophylaxis (if needed) Protonix 40mg IV   G: Glucose Control    B: Bowel Function Stool Occurrence: 1   I: Indwelling Catheter (Lines & [place]) Necessity [place] removed   D: De-escalation of Antimicrobials/Pharmacotherapies Vanc & Zosyn    Plan for the day/ETD MICU    Code Status:  Family/Goals of Care: DNR         Critical secondary to Patient has a condition that poses threat to life and bodily function: Acute Renal Failure, Acute encephalopathy,      Critical care was time spent personally by me on the following activities: development of treatment plan with patient or surrogate and bedside caregivers, discussions with consultants, evaluation of patient's response to treatment, examination of patient, ordering and performing treatments and interventions, ordering and review of laboratory studies, ordering and review of radiographic studies, pulse oximetry, re-evaluation of patient's condition. This critical care time did not overlap with that of any other provider or involve time for any procedures.     Nighat Benitez, DO  Critical Care Medicine  [facility]

## 2020-02-29 NOTE — ASSESSMENT & PLAN NOTE
68 yo male with decompensated THOMAS cirrhosis here for transplant evaluation   Ascites and esophageal varices - paracentesis and banding recently   Liver US with cirrhosis and hepatic lesions (possible HCC)  Elevated PT/INR with thrombocytopenia, mild elevation of AST/ALT     MELD-Na score: 33 at 2/29/2020  3:31 AM  MELD score: 33 at 2/29/2020  3:31 AM  Calculated from:  Serum Creatinine: 1.5 mg/dL at 2/29/2020  3:31 AM  Serum Sodium: 139 mmol/L (Rounded to 137 mmol/L) at 2/29/2020  3:31 AM  Total Bilirubin: 18.4 mg/dL at 2/29/2020  3:31 AM  INR(ratio): 2.9 at 2/29/2020  3:31 AM  Age: 69 years    Plan:  Lactulose/rifaxamine   Lactulose enema started  Possible SBP but unsafe pocket for paracentesis. IR can do paracentesis on Monday, if needed  Continue Vancomycin and Zosyn  CT abdomen without acute infectious process  Hepatology following with continue to monitor. Not a liver transplant candidate at this time due to hemodynamic instablitiy  Worsening AST//227, INR 2.9, TBILI 18.4. Will increase MAP goal to 85 to increase perfusion to kidneys.

## 2020-02-29 NOTE — PROGRESS NOTES
Nephrology Progress Note    Patient followed for SALOMON dialysis dependent, Acute liver failure  Patient was seen on RRT / SLED which was running for uremic toxin clearance / UF for volume management.    Vitals:    02/29/20 0500   BP: (!) 85/50   Pulse: (!) 57   Resp:    Temp:        CMP  Sodium   Date Value Ref Range Status   02/29/2020 139 136 - 145 mmol/L Final   02/29/2020 139 136 - 145 mmol/L Final     Potassium   Date Value Ref Range Status   02/29/2020 4.4 3.5 - 5.1 mmol/L Final   02/29/2020 4.4 3.5 - 5.1 mmol/L Final     Chloride   Date Value Ref Range Status   02/29/2020 104 95 - 110 mmol/L Final   02/29/2020 104 95 - 110 mmol/L Final     CO2   Date Value Ref Range Status   02/29/2020 20 (L) 23 - 29 mmol/L Final   02/29/2020 20 (L) 23 - 29 mmol/L Final     Glucose   Date Value Ref Range Status   02/29/2020 76 70 - 110 mg/dL Final   02/29/2020 76 70 - 110 mg/dL Final     BUN, Bld   Date Value Ref Range Status   02/29/2020 21 8 - 23 mg/dL Final   02/29/2020 21 8 - 23 mg/dL Final     Creatinine   Date Value Ref Range Status   02/29/2020 1.5 (H) 0.5 - 1.4 mg/dL Final   02/29/2020 1.5 (H) 0.5 - 1.4 mg/dL Final     Calcium   Date Value Ref Range Status   02/29/2020 8.5 (L) 8.7 - 10.5 mg/dL Final   02/29/2020 8.5 (L) 8.7 - 10.5 mg/dL Final     Total Protein   Date Value Ref Range Status   02/29/2020 5.8 (L) 6.0 - 8.4 g/dL Final     Albumin   Date Value Ref Range Status   02/29/2020 3.3 (L) 3.5 - 5.2 g/dL Final   02/29/2020 3.3 (L) 3.5 - 5.2 g/dL Final     Total Bilirubin   Date Value Ref Range Status   02/29/2020 18.4 (H) 0.1 - 1.0 mg/dL Final     Comment:     For infants and newborns, interpretation of results should be based  on gestational age, weight and in agreement with clinical  observations.  Premature Infant recommended reference ranges:  Up to 24 hours.............<8.0 mg/dL  Up to 48 hours............<12.0 mg/dL  3-5 days..................<15.0 mg/dL  6-29 days.................<15.0 mg/dL       Alkaline  Phosphatase   Date Value Ref Range Status   02/29/2020 231 (H) 55 - 135 U/L Final     AST   Date Value Ref Range Status   02/29/2020 774 (H) 10 - 40 U/L Final     ALT   Date Value Ref Range Status   02/29/2020 227 (H) 10 - 44 U/L Final     Anion Gap   Date Value Ref Range Status   02/29/2020 15 8 - 16 mmol/L Final   02/29/2020 15 8 - 16 mmol/L Final     eGFR if    Date Value Ref Range Status   02/29/2020 54.5 (A) >60 mL/min/1.73 m^2 Final   02/29/2020 54.5 (A) >60 mL/min/1.73 m^2 Final     eGFR if non    Date Value Ref Range Status   02/29/2020 47.2 (A) >60 mL/min/1.73 m^2 Final     Comment:     Calculation used to obtain the estimated glomerular filtration  rate (eGFR) is the CKD-EPI equation.      02/29/2020 47.2 (A) >60 mL/min/1.73 m^2 Final     Comment:     Calculation used to obtain the estimated glomerular filtration  rate (eGFR) is the CKD-EPI equation.          CBC  Lab Results   Component Value Date    WBC 18.20 (H) 02/29/2020    HGB 7.8 (L) 02/29/2020    HCT 25.8 (L) 02/29/2020     (H) 02/29/2020    PLT 39 (LL) 02/29/2020         Intake/Output Summary (Last 24 hours) at 2/29/2020 0725  Last data filed at 2/29/2020 0600  Gross per 24 hour   Intake 5321.66 ml   Output 6256 ml   Net -934.34 ml         Patient hemodynamically not stable for intermittent HD yet.  Will continue RRT / SLED for volume and toxin clearance.  SLED prescription and dialysate baths were reviewed and changes made according to most recent labs.   -300 mL/hr as tolerated, keep MAP >65.      Follow labs serially while on RRT / SLED to monitor electrolytes and follow replacement protocol as needed.     Estuardo Hogue MD  Nephrology  [facility]

## 2020-02-29 NOTE — PROGRESS NOTES
[facility]  Hepatology  Consult/progress note    Patient Name: Sushil Mcmahon  MRN: [id number]  Admission Date: 2/27/2020  Hospital Length of Stay: 2 days  Code Status: DNR   Attending Provider: Surinder Mahan MD   Consulting Provider: Manoj Ferguson MD  Primary Care Physician: Deins Fulton MD  Principal Problem:Decompensated HCV cirrhosis    Consults  Subjective:     Patient is agitated this AM, moaning, NG tube in place, unable to have a conversation due to confusion.     HPI: Mr. Sushil Mcmahon is a 69 y.o. male w/ PMH THOMAS cirrhosis (complicated by varices in Dec 2019, ascites requiring weekly paracentesis, hepatic encephalopathy), a-fib, T2DM, hypothyroidism, CAD s/p recent CABG 2/11/20 who was transferred from [facility] to [facility] for OLT evaluation. Patient confused when hepatology saw the patient, most of the history was gathered from the wife and chart review. Patient was diagnosed with THOMAS cirrhosis somewhere around September 2019 and during his liver transplant evaluation/work up in [place] was found to have a multivessel CAD . He then was seen at [facility] in [place] for an evaluation for CABG and Liver transplant combined together at the same time but given his comorbidities, risk factors and high risk of mortality he was not considered a candidate there. He then subsequently ended up following up with cardiology and CTS in [place] and had a CABG on Feb 11/2020 at [facility] without a combined OLT. Patient was informed that the surgery is very high risk and he has a high chance of mortality post surgery but patient and family wanted to pursue it. S/P CABG patient continued to be hospitalized and post op complications including pneumothorax, acute renal failure and decompensating liver disease. He was then transferred to [facility] after the case was reviewed with transplant team to evaluate for OLT. Patient's baseline  MELD initially a few months prior was 28 and it was 36 on arrival to [facility]. Hepatology consulted for decompensated cirrhosis requiring OLT evaluation.     Social History     Socioeconomic History    Marital status:      Spouse name: Not on file    Number of children: Not on file    Years of education: Not on file    Highest education level: Not on file   Occupational History    Not on file   Social Needs    Financial resource strain: Not on file    Food insecurity:     Worry: Not on file     Inability: Not on file    Transportation needs:     Medical: Not on file     Non-medical: Not on file   Tobacco Use    Smoking status: Not on file   Substance and Sexual Activity    Alcohol use: Not on file    Drug use: Not on file    Sexual activity: Not on file   Lifestyle    Physical activity:     Days per week: Not on file     Minutes per session: Not on file    Stress: Not on file   Relationships    Social connections:     Talks on phone: Not on file     Gets together: Not on file     Attends Tenriism service: Not on file     Active member of club or organization: Not on file     Attends meetings of clubs or organizations: Not on file     Relationship status: Not on file   Other Topics Concern    Not on file   Social History Narrative    Not on file       No current facility-administered medications on file prior to encounter.      No current outpatient medications on file prior to encounter.       Review of patient's allergies indicates:   Allergen Reactions    Lisinopril        ROS     Objective:     Vitals:    02/29/20 1101   BP: (!) 90/51   Pulse: (!) 49   Resp: 13   Temp: 97.9 °F (36.6 °C)         Physical Exam   Constitutional: in distress  Ill appearing    HENT:   Head: Normocephalic and atraumatic.   Eyes: Pupils are equal, round, and reactive to light. Scleral icterus is present.   Neck: Normal range of motion. No JVD present.   Cardiovascular: Normal rate and regular rhythm.   No murmur  heard.  Left sided PICC line in place   Pulmonary/Chest: Effort normal. No respiratory distress. He has wheezes. He has rales.   Post sternotomy surgical site healing well   Abdominal: Soft. He exhibits distension. He exhibits no mass. There is tenderness. There is no rebound and no guarding.   Genitourinary:   Genitourinary Comments: [place] in place   LE Edema present.   Musculoskeletal: Normal range of motion. He exhibits edema (Bilateral lower extemities).   Lymphadenopathy:     He has no cervical adenopathy.   Neurological:   Confused, lethargic and somnolent    Skin: Skin is warm. No rash noted. He is not diaphoretic. No erythema.   Ecchymosis           Significant Labs:  Recent Labs   Lab 02/27/20  2232 02/28/20  0315 02/29/20  0331   HGB 8.0* 8.8* 7.8*       Lab Results   Component Value Date    WBC 18.20 (H) 02/29/2020    HGB 7.8 (L) 02/29/2020    HCT 25.8 (L) 02/29/2020     (H) 02/29/2020    PLT 39 (LL) 02/29/2020       Lab Results   Component Value Date     02/29/2020     02/29/2020    K 4.4 02/29/2020    K 4.4 02/29/2020     02/29/2020     02/29/2020    CO2 20 (L) 02/29/2020    CO2 20 (L) 02/29/2020    BUN 21 02/29/2020    BUN 21 02/29/2020    CREATININE 1.5 (H) 02/29/2020    CREATININE 1.5 (H) 02/29/2020    CALCIUM 8.5 (L) 02/29/2020    CALCIUM 8.5 (L) 02/29/2020    ANIONGAP 15 02/29/2020    ANIONGAP 15 02/29/2020    ESTGFRAFRICA 54.5 (A) 02/29/2020    ESTGFRAFRICA 54.5 (A) 02/29/2020    EGFRNONAA 47.2 (A) 02/29/2020    EGFRNONAA 47.2 (A) 02/29/2020       Lab Results   Component Value Date     (H) 02/29/2020     (H) 02/29/2020    ALKPHOS 231 (H) 02/29/2020    BILITOT 18.4 (H) 02/29/2020       Lab Results   Component Value Date    INR 2.9 (H) 02/29/2020    INR 2.3 (H) 02/28/2020    INR 1.9 (H) 02/27/2020           Significant Imaging:  Reviewed pertinent radiology findings.       Assessment/Plan:       Mr. Sushil Mcmahon is a 69 y.o. male w/ PMH THOMAS cirrhosis  (complicated by varices in Dec 2019, ascites requiring weekly paracentesis, hepatic encephalopathy), a-fib, T2DM, hypothyroidism, CAD s/p recent CABG 2/11/20 who was transferred from [facility] [place]. Patient with a MELD of 38 today and with worsening renal failure likely hepatorenal syndrome. Acutely worsening decompensated cirrhosis with encephalopathy at this time. Patient roxie class C cirrhosis. Patient will need to be optimized and resuscitated prior to presentation to the LTS committee as the risks outweigh the benefits of the surgery. Patient hemodynamically unstable, in distributive shock  and on vasopressors at this time. Patient's CRAFT score of post surgical mortality in a patient with cirrhosis is 88% in 30 days.     MELD-Na score: 33 at 2/29/2020  3:31 AM  MELD score: 33 at 2/29/2020  3:31 AM  Calculated from:  Serum Creatinine: 1.5 mg/dL at 2/29/2020  3:31 AM  Serum Sodium: 139 mmol/L (Rounded to 137 mmol/L) at 2/29/2020  3:31 AM  Total Bilirubin: 18.4 mg/dL at 2/29/2020  3:31 AM  INR(ratio): 2.9 at 2/29/2020  3:31 AM  Age: 69 years    Problem List:  1. THOMAS cirrhosis in decompensated liver failure after CABG on 2/22/2020(with high post op risk (Patient's CRAFT score of post surgical mortality in a patient with cirrhosis is 88% in 30 days. )  2. HRS on CRRT  3. Sepsis, unclear etiology, possible PNA on vanc/zosyn  4. HE: on lactulose, continue per NG and enema, titrate to 3 Bm perday    Plan:  - Not a candidate for OLT given his hemodynamic instability but will continue to monitor and present to LTS once there is clinical improvement  -continue care per ICU, continue antibiotics  -para r/o SBP  -nephro on board for HRS    Please obtain daily CBC, BMP, LFT, INR  Thank you for involving us in the care of Sushil Mcmahon. Please call with any additional questions, concerns or changes in the patient's clinical status.    Manoj Ferguson MD  Gastroenterology  Fellow PGY IV   [facility]

## 2020-02-29 NOTE — NURSING
Bedside 12 Lead EKG shows pt in a fib RVR. , MAP 85. Currently transitioning pt to 32 mg levo gtt from previous 4 mg levo gtt. Eli VIGIL at bedside. Orders to continue with MAP goal 85 and add vaso gtt with goals to decrease levo gtt requirements. Awaiting vaso gtt from pharmacy at this time. Pt HR sustained ~110-120s. WCTM.

## 2020-02-29 NOTE — SUBJECTIVE & OBJECTIVE
Interval History/Significant Events: Patient with agitation overnight so he was started on Precedex. He was also made DNR overnight. Patient with worsening liver function today, plan to increase MAP goal to 85, to increase perfusion to the liver.    Review of Systems   Unable to perform ROS: Mental status change     Objective:     Vital Signs (Most Recent):  Temp: 97.9 °F (36.6 °C) (02/29/20 1101)  Pulse: (!) 114 (02/29/20 1300)  Resp: 13 (02/29/20 1300)  BP: 100/67 (02/29/20 1300)  SpO2: (!) 93 % (02/29/20 1300) Vital Signs (24h Range):  Temp:  [96.5 °F (35.8 °C)-98.2 °F (36.8 °C)] 97.9 °F (36.6 °C)  Pulse:  [] 114  Resp:  [11-32] 13  SpO2:  [92 %-100 %] 93 %  BP: ()/(43-67) 100/67   Weight: 91.5 kg (201 lb 13 oz)  Body mass index is 29.8 kg/m².      Intake/Output Summary (Last 24 hours) at 2/29/2020 1314  Last data filed at 2/29/2020 1300  Gross per 24 hour   Intake 6349.53 ml   Output 7434 ml   Net -1084.47 ml       Physical Exam   Constitutional: He appears well-developed. No distress.   HENT:   Head: Normocephalic and atraumatic.   Eyes: Scleral icterus is present.   Cardiovascular: Intact distal pulses. Bradycardia present.   Clean dressing located across central chest from recent CABG . No redness or drainage around the area   Pulmonary/Chest: He is in respiratory distress. He has wheezes. He has rales.   R. IJ  in place   Abdominal: Soft. He exhibits no mass. There is tenderness (lower quadrant and groin).   Musculoskeletal: He exhibits no tenderness.   Neurological: He is alert. He is disoriented.   Skin: Skin is warm. There is erythema (Ecchymosis in R. lower extremity).   Psychiatric: He is agitated.       Vents:     Lines/Drains/Airways     Peripherally Inserted Central Catheter Line            PICC Double Lumen left brachial -- days          Central Venous Catheter Line            Trialysis (Dialysis) Catheter 02/27/20 1820 right internal jugular 1 day          Drain                 NG/OG  Tube 02/28/20 0700 Right nostril 1 day         Fecal Incontinence  02/29/20 0300 less than 1 day              Significant Labs:    CBC/Anemia Profile:  Recent Labs   Lab 02/27/20  2232 02/28/20 0315 02/29/20  0331   WBC 16.86* 16.50* 18.20*   HGB 8.0* 8.8* 7.8*   HCT 26.6* 28.2* 25.8*   PLT 74* 73* 39*   * 104* 107*   RDW 23.7* 23.8* 24.9*   IRON  --  77  --    FERRITIN  --  4,865*  --         Chemistries:  Recent Labs   Lab 02/28/20 0315 02/28/20  1312 02/29/20  0331     136 135* 139  139   K 4.7  4.7 4.9 4.4  4.4     100 101 104  104   CO2 18*  18* 18* 20*  20*   BUN 89*  89* 77* 21  21   CREATININE 3.3*  3.3* 3.5* 1.5*  1.5*   CALCIUM 9.5  9.5 9.1 8.5*  8.5*   ALBUMIN 4.1  4.1 3.5 3.3*  3.3*   PROT 6.8  --  5.8*   BILITOT 17.9*  --  18.4*   ALKPHOS 242*  --  231*   *  --  227*   *  --  774*   MG 2.3 2.1 2.0   PHOS 4.3  4.3 5.0* 4.2  4.2       Lactic Acid:   Recent Labs   Lab 02/28/20  1431 02/29/20  0331 02/29/20  1028   LACTATE 5.3* 3.6* 6.2*       Significant Imaging:  I have reviewed all pertinent imaging results/findings within the past 24 hours.

## 2020-02-29 NOTE — PLAN OF CARE
CMICU DAILY GOALS       A: Awake    RASS: Goal - RASS Goal: 0-->alert and calm  Actual - RASS (Salguero Agitation-Sedation Scale): 1-->restless   Restraint necessity:    B: Breath   SBT: Not intubated   C: Coordinate A & B, analgesics/sedatives   Pain: managed    SAT: Not intubated  D: Delirium   CAM-ICU: Overall CAM-ICU: Positive  E: Early Mobility   MOVE Screen: Fail   Activity: Activity Management: bedrest maintained per order  FAS: Feeding/Nutrition   Diet order: Diet/Nutrition Received: NPO,   Fluid restriction:    T: Thrombus   DVT prophylaxis: VTE Required Core Measure: Pharmacological prophylaxis initiated/maintained  H: HOB Elevation   Head of Bed (HOB): HOB at 30-45 degrees  U: Ulcer Prophylaxis   GI: yes  G: Glucose control   managed    S: Skin   Bundle compliance: yes   Bathing/Skin Care: bath, partial, linen changed, dressed/undressed, back care, bath, chlorhexidine Date: day bath   B: Bowel Function   no issues   I: Indwelling Catheters   [place] necessity: [REMOVED]      Urethral Catheter 02/27/20 1300-Reason for Continuing Urinary Catheterization: Critically ill in ICU requiring intensive monitoring   CVC necessity: Yes   IPAD offered: Not appropriate  D: De-escalation Antibx   No  Plan for the day   Continue plan of care. Monitor vitals and mental status. See if pt is able to void.   Family/Goals of care/Code Status   Code Status: DNR     No acute events throughout day, VS and assessment per flow sheet, patient progressing towards goals as tolerated, plan of care reviewed with Sushil Mcmahon and family, all concerns addressed, will continue to monitor.

## 2020-02-29 NOTE — ASSESSMENT & PLAN NOTE
Severe thrombocytopenia 39. Likely secondary to cirrhosis, 120 in Jan, 2020  Daily CBC   Hold Heparin 5K Q8H

## 2020-02-29 NOTE — PROGRESS NOTES
Pharmacokinetic Assessment Follow Up: IV Vancomycin    Vancomycin Assessment/Plan:  · Vancomycin 1.5 g x 1 administered yesterday at 0936. Random level with AM labs (~18 hour level) resulted at 12.5 mcg/mL.  · Administer 1.5 g x 1 now. Continue to pulse dose while patient remains on RRT.  · Vancomycin random ordered with AM labs for Elmer 3/1. Additional doses will depend on RRT plans and level.    Drug levels (last 3 results):  Recent Labs   Lab Result Units 02/29/20  0331   Vancomycin, Random ug/mL 12.5       Pharmacy will continue to follow and monitor vancomycin. Please contact pharmacy for questions regarding this assessment.    Thank you for the consult,   Nona Nathan, PharmD, [facility]  Critical Care Clinical Pharmacist  Spectralink: [phone]  _______________________________________________________________________________________________________________________     Patient brief summary:  Sushil Mcmahon is a 69 y.o. male initiated on antimicrobial therapy with IV Vancomycin for treatment of sepsis    The patient's current regimen is pulse dosing.    Drug Allergies:   Review of patient's allergies indicates:   Allergen Reactions    Lisinopril        Actual Body Weight:   91.5 kg    Renal Function:   Estimated Creatinine Clearance: 52.7 mL/min (A) (based on SCr of 1.5 mg/dL (H)).,     Dialysis Method (if applicable):  CRRT - SLED    CBC (last 72 hours):  Recent Labs   Lab Result Units 02/27/20  1013 02/27/20  2232 02/28/20  0315 02/29/20  0331   WBC K/uL 13.99*  12.80* 16.86* 16.50* 18.20*   Hemoglobin g/dL 8.0*  8.0* 8.0* 8.8* 7.8*   Hemoglobin A1C % 5.9*  --   --   --    Hematocrit % 25.4*  25.2* 26.6* 28.2* 25.8*   Platelets K/uL 71*  68* 74* 73* 39*   Gran% % 82.8*  80.2* 80.8* 85.8* 82.6*   Lymph% % 8.0*  10.2* 9.2* 6.3* 6.8*   Mono% % 8.0  8.0 8.8 6.7 9.5   Eosinophil% % 0.5  1.0 0.3 0.4 0.3   Basophil% % 0.1  0.1 0.1 0.1 0.1   Differential Method  Automated  Automated Automated Automated Automated        Metabolic Panel (last 72 hours):  Recent Labs   Lab Result Units 02/27/20  1013 02/27/20  2027 02/28/20  0315 02/28/20  1312 02/29/20  0331   Sodium mmol/L 136  137 135* 136  136 135* 139  139   Potassium mmol/L 4.8  4.5 5.0 4.7  4.7 4.9 4.4  4.4   Chloride mmol/L 98  99 98 100  100 101 104  104   CO2 mmol/L 22*  21* 19* 18*  18* 18* 20*  20*   Glucose mg/dL 94  118* 72 49*  49* 100 76  76   BUN, Bld mg/dL 172*  162* 173* 89*  89* 77* 21  21   Creatinine mg/dL 5.7*  5.4* 5.7* 3.3*  3.3* 3.5* 1.5*  1.5*   Albumin g/dL 3.8  4.0 3.8 4.1  4.1 3.5 3.3*  3.3*   Total Bilirubin mg/dL 14.2*  13.5*  --  17.9*  --  18.4*   Alkaline Phosphatase U/L 205*  202*  --  242*  --  231*   AST U/L 73*  66*  --  417*  --  774*   ALT U/L 28  26  --  114*  --  227*   Magnesium mg/dL  --  2.6 2.3 2.1 2.0   Phosphorus mg/dL  --  8.0* 4.3  4.3 5.0* 4.2  4.2       Vancomycin Administrations:  vancomycin given in the last 96 hours                   vancomycin 1.5 g in dextrose 5 % 250 mL IVPB (ready to mix) (mg) 1,500 mg New Bag 02/28/20 0936                Microbiologic Results:  Microbiology Results (last 7 days)     Procedure Component Value Units Date/Time    Blood culture [[id number]] Collected:  02/27/20 1609    Order Status:  Completed Specimen:  Blood from Peripheral, Antecubital, Right Updated:  02/28/20 1812     Blood Culture, Routine No Growth to date      No Growth to date    Blood culture [[id number]] Collected:  02/27/20 1616    Order Status:  Completed Specimen:  Blood from Peripheral, Hand, Right Updated:  02/28/20 1812     Blood Culture, Routine No Growth to date      No Growth to date    Respiratory Infection Panel, Nasopharyngeal [[id number]] Collected:  02/27/20 2225    Order Status:  Completed Specimen:  Nasopharyngeal Swab Updated:  02/28/20 0002     Respiratory Infection Panel Source NP Swab     Adenovirus Not Detected     Coronavirus 229E, Common Cold Virus Not Detected     Coronavirus  HKU1, Common Cold Virus Not Detected     Coronavirus NL63, Common Cold Virus Not Detected     Coronavirus OC43, Common Cold Virus Not Detected     Comment: The Coronavirus strains detected in this test cause the common cold.  These strains are not the COVID-19 (novel Coronavirus)strain   associated with the respiratory disease outbreak.          Human Metapneumovirus Not Detected     Human Rhinovirus/Enterovirus Not Detected     Influenza A (subtypes H1, H1-2009,H3) Not Detected     Influenza B Not Detected     Parainfluenza Virus 1 Not Detected     Parainfluenza Virus 2 Not Detected     Parainfluenza Virus 3 Not Detected     Parainfluenza Virus 4 Not Detected     Respiratory Syncytial Virus Not Detected     Bordetella Parapertussis (WA4455) Not Detected     Bordetella pertussis (ptxP) Not Detected     Chlamydia pneumoniae Not Detected     Mycoplasma pneumoniae Not Detected     Comment: Respiratory Infection Panel testing performed by Multiplex PCR.       Narrative:       For all other respiratory sources order [id number] Respiratory  Viral Panel by PCR (RVPCR)    Influenza A & B by Molecular [[id number]] Collected:  02/27/20 2225    Order Status:  Completed Specimen:  Nasopharyngeal Swab Updated:  02/27/20 2308     Influenza A, Molecular Negative     Influenza B, Molecular Negative     Flu A & B Source Nasal swab

## 2020-02-29 NOTE — ASSESSMENT & PLAN NOTE
70 yo male with s/p recent CABAG and decompensated cirrhosis with history of ascites and esophageal varices here for liver transplant evaluation now with acute hypoxia.     Post CABG complication with pneumothorax and pleural effusion from OSH. Pneumothorax resolved, chest tube removed at OSH.   Thoracentesis at OSH with 600 cc removed    Repeat chest xray with evidence of pulmonary edema with possible loculation on left and opacity on right. Possible ADHF vs aspiration pneumonia/pneumontitis vs hepatic hydrothorax vs volume over load from ARF    Plan:  - CXR with worsening pulmonary infiltrates, volume optimization via dialysis. CXR 2/29 mildy improved but still with b/l pulmonary infiltrates  - Continue Vancomycin and Zosyn  - Flu negative, RIP negative  - Wean off oxygen as tolerated, currently on 12L  HFNC saturating at 91%

## 2020-02-29 NOTE — ASSESSMENT & PLAN NOTE
Severe abdominal pain in the suprapubic and penile region. Unclear why. Abdo compartment syndrome? Bladder unremarkable on scan. SBP?  CT abdo WO contrast with findings of cirrhotic liver and prominent lymph nodes  Possible SBP but unsafe pocket for paracentesis. IR can do paracentesis on weekday  Will treat with Vancomycin and Zosyn

## 2020-02-29 NOTE — NURSING
Eli VIGIL called to bedside. Pt extremely restless and agitated. Repeatedly pulling at lines, moaning, and yelling for help. Wife at bedside attempting to calm pt with nurse. Explained to MD with increase in Precedex gtt, pt HR drops to ~48. PRN Dilaudid given. Pt more relaxed at this time. VS: HR 48, SpO2 93% on 12 L High flow NC, /44 (63). Currently at precedex @ 0.7, levo @ 0.1. WCTM.

## 2020-02-29 NOTE — ASSESSMENT & PLAN NOTE
CABG in Feb, 2020 with post-op complication of pneumothorax and pleural effusion   Bradycardia on the floor  Chest xray with pulmonary edema   EKG with sinus bradycardia  Troponin with flat trend  2D ECHO: EF 65%, no wall motion abnormalities, indeterminate L.V diastolic dysfunction  Added Vasopressin to maintain MAP goal of 85

## 2020-02-29 NOTE — ASSESSMENT & PLAN NOTE
Decompensated cirrhosis with anuric SALOMON, sCr 5.4, unclear baseline however sCr was 1.0 in January, 2020. Possible HRS vs ATN  Renal US without acute abnormalities   Nephrology Consult ,Continue SLED  BUN improved , now 21  Still anuric

## 2020-03-01 PROBLEM — I48.0 PAROXYSMAL ATRIAL FIBRILLATION WITH RAPID VENTRICULAR RESPONSE: Status: ACTIVE | Noted: 2020-01-01

## 2020-03-01 NOTE — PROGRESS NOTES
[facility]  Critical Care Medicine  Progress Note    Patient Name: Sushil Mcmahon  MRN: [id number]  Admission Date: 2/27/2020  Hospital Length of Stay: 3 days  Code Status: DNR  Attending Provider: Surinder Mahan MD  Primary Care Provider: Denis Fulton MD   Principal Problem: Decompensated HCV cirrhosis    Subjective:     HPI:  Mr. Mcmahon is a 68yo male with cirrhosis/liver failure due to THOMAS, history of hepatic encephalopathy, variceal bleeding, diabetes, hypothyroidism, anxiety/depression, hypotension, A-fib, recent CABG 2/11/2020 who presents as a transfer from [facility] in [place] for liver transplant evaluation. Per notes, patient was previously worked up with liver transplant but was not a candidate due to multiple comorbidities including multi vessel coronary artery disease which required CABG. He was deemed high risk due to multiple comorbidities, also turned down at [facility] where he presented for a second opinion. He eventually went to [facility] were he got a 4- vessel CABG on 2/11. The procedure was complicated by pneumothorax, SALOMON and decompensated liver failure with (MELD 30). He was stabilized and transferred to [facility] for transplant hepatology evaluation, however he was denied candidacy. Patient was then transferred to [facility] 2/27 for higher level of care and liver transplant evaluation. Critical care consulted for hypoxia, encephalopathy and acute renal failure.    On evaluation, patient was encephalopathic and  appeared in distress 2/2 to pain, saturating 86% in 15L n/c. Per wife, patient has been experiencing worse pain in the past few days. The pain is nonspecifically located in his groin/pelvic region. He was also reported to be anuric since arrival at with [facility]. Labs significant for WBC 12.80, HGB 8.0, PLT 6.8, BUN/Cr 162/5.4, lactic acid of 2.5. Patient was given a dose of dilaudid on the floor, after  which he became difficult to arouse, this was subsequently treated with Narcan and patient responded appropriately.     Hospital/ICU Course:  Mr. Mcmahon was admitted to MICU for acute metabolic encephalopathy, hypoxia and acute respiratory failure. Patient was started on HD for for uremia, oliguria and renal failure. CT head was ordered to evaluate for worsening encephalopathy, which was unremarkable. CT abd/pelvis revealed b/l pleural effusions and a cirrhotic liver with prominent lymph nodes. Patient was started on broad spectrum antibiotics to cover for SBP & PNA and septic shock. He was also started on Norepinephrine to maintain MAPs of 65. Patient also started on lactulose enema's for hepatic encephalopathy. Patient with no improvement in mental status despite having bowel movements on the enema. He was started on Precedex for agitation. Patient with worsening liver function 2/29, added on Vasopressin for MAP goal of 85. Patient still with worsening liver function, having paroxysmal atrial fibrillation. Metoprolol 5 mg IV x 3 ordered for HR sustained in 150's, subsequent bradycardia to the 40's. Amiodarone ggt started for rate control. Discussion with wife on goals of care, she stated she will like to give him till Tuesday, 3/3/2020 before making a final decision on withdrawing care. Pt placed on 24hr EEG monitoring.    Interval History/Significant Events: Patient remains encephalopathic with worsening liver function, despite lactulose and Rifaximin. 24 hr EEG to rule out seizures. MAP gpal adjusted to 65, attempting to wean off pressors as tolerated. Patient started on amiodarone ggt for Afib with RVR.    Review of Systems   Unable to perform ROS: Mental status change     Objective:     Vital Signs (Most Recent):  Temp: 98 °F (36.7 °C) (03/01/20 1500)  Pulse: 74 (03/01/20 1615)  Resp: 17 (03/01/20 1615)  BP: (!) 110/57 (03/01/20 1615)  SpO2: (!) 94 % (03/01/20 1615) Vital Signs (24h Range):  Temp:  [97.6 °F  (36.4 °C)-98.7 °F (37.1 °C)] 98 °F (36.7 °C)  Pulse:  [] 74  Resp:  [11-26] 17  SpO2:  [91 %-98 %] 94 %  BP: ()/(34-69) 110/57   Weight: 91.5 kg (201 lb 13 oz)  Body mass index is 29.8 kg/m².      Intake/Output Summary (Last 24 hours) at 3/1/2020 1706  Last data filed at 3/1/2020 1600  Gross per 24 hour   Intake 6850.8 ml   Output 7265 ml   Net -414.2 ml       Physical Exam   Constitutional: He appears well-developed. No distress.   HENT:   Head: Normocephalic and atraumatic.   Eyes: Scleral icterus is present.   Cardiovascular: Intact distal pulses. An irregularly irregular rhythm present.   Clean dressing located across central chest from recent CABG . No redness or drainage around the area   Pulmonary/Chest: No respiratory distress. He has no wheezes. He has rales.   R. IJ  in place   Abdominal: Soft. He exhibits no mass. There is tenderness (generalized).   Musculoskeletal: He exhibits no tenderness.   Neurological: He is alert. He is disoriented.   Skin: Skin is warm. There is erythema (Ecchymosis in R. lower extremity).   jaundice   Psychiatric: He is agitated.       Vents:     Lines/Drains/Airways     Peripherally Inserted Central Catheter Line            PICC Double Lumen left brachial -- days          Central Venous Catheter Line            Trialysis (Dialysis) Catheter 02/27/20 1820 right internal jugular 2 days          Drain                 NG/OG Tube 02/28/20 0700 Right nostril 2 days         Fecal Incontinence  02/29/20 0300 1 day              Significant Labs:    CBC/Anemia Profile:  Recent Labs   Lab 02/29/20  0331 03/01/20  0345   WBC 18.20* 15.43*   HGB 7.8* 8.1*   HCT 25.8* 26.9*   PLT 39* 30*   * 110*   RDW 24.9* 25.7*        Chemistries:  Recent Labs   Lab 02/29/20  0331  02/29/20  2225 03/01/20  0345 03/01/20  1249     139   < > 139 139 138   K 4.4  4.4   < > 4.5 4.6 4.7     104   < > 106 107 107   CO2 20*  20*   < > 18* 18* 17*   BUN 21  21   < > 8  6* 5*   CREATININE 1.5*  1.5*   < > 0.8 0.8 0.7   CALCIUM 8.5*  8.5*   < > 8.6* 8.6* 8.4*   ALBUMIN 3.3*  3.3*   < > 3.1* 3.1* 2.8*   PROT 5.8*  --   --  5.5*  --    BILITOT 18.4*  --   --  20.4*  --    ALKPHOS 231*  --   --  266*  --    *  --   --  269*  --    *  --   --  786*  --    MG 2.0   < > 1.9 2.0 2.0   PHOS 4.2  4.2   < > 2.2* 2.5* 2.0*    < > = values in this interval not displayed.       Lactic Acid:   Recent Labs   Lab 02/29/20  0331 02/29/20  1028 02/29/20  1519   LACTATE 3.6* 6.2* 4.7*       Significant Imaging:  n/a      ABG  Recent Labs   Lab 03/01/20  0902   PH 7.446   PO2 34*   PCO2 28.3*   HCO3 19.5*   BE -5     Assessment/Plan:     Pulmonary  Acute respiratory failure with hypoxia  68 yo male with s/p recent CABAG and decompensated cirrhosis with history of ascites and esophageal varices here for liver transplant evaluation now with acute hypoxia.     Post CABG complication with pneumothorax and pleural effusion from OSH. Pneumothorax resolved, chest tube removed at OSH.   Thoracentesis at OSH with 600 cc removed    Repeat chest xray with evidence of pulmonary edema with possible loculation on left and opacity on right. Possible ADHF vs aspiration pneumonia/pneumontitis vs hepatic hydrothorax vs volume over load from ARF    Plan:  - CXR with worsening pulmonary infiltrates, volume optimization via dialysis. CXR 2/29 mildy improved but still with b/l pulmonary infiltrates  - Continue Vancomycin and Zosyn  - Flu negative, RIP negative  - Wean off oxygen as tolerated, currently on 12L  HFNC saturating at 91%         Cardiac/Vascular  Paroxysmal atrial fibrillation with rapid ventricular response  Patient with HR in 150's sustained. S/p Metoprolol 5mg IV x 3 doses    Plan  - Start Amiodarone ggt  - No anticoagulation due to PLT count of 30    Coronary artery disease involving coronary bypass graft of native heart with angina pectoris  CABG in Feb, 2020 with post-op complication of  pneumothorax and pleural effusion.Bradycardia on the floor  Chest xray with pulmonary edema   EKG with sinus bradycardia. Now Atrial fibrillation with RVR, HR 140s  Troponin with flat trend  2D ECHO: EF 65%, no wall motion abnormalities, indeterminate L.V diastolic dysfunction  Added Vasopressin to maintain MAP goal of 85    Renal/  Penile pain  Urology consulted, recommended felix to be removed    SALOMON (acute kidney injury)  Decompensated cirrhosis with anuric SALOMON, sCr 5.4, unclear baseline however sCr was 1.0 in January, 2020. Possible HRS vs ATN  Renal US without acute abnormalities   Nephrology Consult ,Continue SLED  BUN improved , now 5  Still anuric    ID  Septic shock  Unknown etiology at this time, suspect SBP vs PNA. Currently requiring vasopressor support (Levo + Vaso )  Lactic acid trend 3.6  - 6.2, . WBC increase 16.5 to 18.20  Blood cultures NGTD  Started on Vancomycin and Zosyn      Hematology  Thrombocytopenia  Severe thrombocytopenia 39. Likely secondary to cirrhosis, 120 in Jan, 2020  Daily CBC   Hold Heparin 5K Q8H      Endocrine  Severe malnutrition  Started on tube feeds    GI  * decompensated cirrhosis   68 yo male with decompensated THOMAS cirrhosis here for transplant evaluation   Ascites and esophageal varices - paracentesis and banding recently   Liver US with cirrhosis and hepatic lesions (possible HCC)  Elevated PT/INR with thrombocytopenia, mild elevation of AST/ALT     MELD-Na score: 33 at 3/1/2020 12:49 PM  MELD score: 33 at 3/1/2020 12:49 PM  Calculated from:  Serum Creatinine: 0.7 mg/dL (Rounded to 1 mg/dL) at 3/1/2020 12:49 PM  Serum Sodium: 138 mmol/L (Rounded to 137 mmol/L) at 3/1/2020 12:49 PM  Total Bilirubin: 20.4 mg/dL at 3/1/2020  3:45 AM  INR(ratio): 3.8 at 3/1/2020  3:45 AM  Age: 69 years    Plan:  Lactulose/rifaxamine   Lactulose enema started  Possible SBP but unsafe pocket for paracentesis. IR can do paracentesis on Monday, if needed  Continue Vancomycin and Zosyn  CT  abdomen without acute infectious process  Hepatology following with continue to monitor. Not a liver transplant candidate at this time due to hemodynamic instablitiy  Worsening AST//269, INR 3.8, TBILI 20.4.   Vitamin K 10mg IV for 3 days  Patient with encephalopathy, likely hepatic. Will order 24 EEG to r/o seizures. Continue Lactulose enema and Rifaximin.    Abdominal pain  Severe abdominal pain in the suprapubic and penile region. Unclear why. Abdo compartment syndrome? Bladder unremarkable on scan. SBP?  CT abdo WO contrast with findings of cirrhotic liver and prominent lymph nodes  Possible SBP but unsafe pocket for paracentesis. IR can do paracentesis on weekday  Will treat with Vancomycin and Zosyn           Critical Care Daily Checklist:     A: Awake: RASS Goal/Actual Goal: RASS Goal: 0-->alert and calm  Actual: Salguero Agitation Sedation Scale (RASS): Light sedation   B: Spontaneous Breathing Trial Performed?    C: SAT & SBT Coordinated?  N/A                  D: Delirium: CAM-ICU Overall CAM-ICU: Positive   E: Early Mobility Performed? No   F: Feeding Goal: Goals: Meet % EEN, EPN by RD f/u date  Status: Nutrition Goal Status: new   Current Diet Order   No orders of the defined types were placed in this encounter.       AS: Analgesia/Sedation PRECEDEX   T: Thromboembolic Prophylaxis Hold for PLT < 30   H: HOB > 300 Yes   U: Stress Ulcer Prophylaxis (if needed) Protonix 40mg IV   G: Glucose Control     B: Bowel Function Stool Occurrence: 1   I: Indwelling Catheter (Lines & [place]) Necessity [place] removed   D: De-escalation of Antimicrobials/Pharmacotherapies Vanc & Zosyn     Plan for the day/ETD MICU     Code Status:  Family/Goals of Care: DNR         Critical secondary to Patient has a condition that poses threat to life and bodily function: Acute Renal Failure, Acute encephalopathy     Critical care was time spent personally by me on the following activities: development of treatment plan with  patient or surrogate and bedside caregivers, discussions with consultants, evaluation of patient's response to treatment, examination of patient, ordering and performing treatments and interventions, ordering and review of laboratory studies, ordering and review of radiographic studies, pulse oximetry, re-evaluation of patient's condition. This critical care time did not overlap with that of any other provider or involve time for any procedures.     Nighat Benitez, DO  Critical Care Medicine  [facility]

## 2020-03-01 NOTE — SUBJECTIVE & OBJECTIVE
Interval History/Significant Events: Patient remains encephalopathic with worsening liver function, despite lactulose and Rifaximin. 24 hr EEG to rule out seizures. MAP gpal adjusted to 65, attempting to wean off pressors as tolerated. Patient started on amiodarone ggt for Afib with RVR.    Review of Systems   Unable to perform ROS: Mental status change     Objective:     Vital Signs (Most Recent):  Temp: 98 °F (36.7 °C) (03/01/20 1500)  Pulse: 74 (03/01/20 1615)  Resp: 17 (03/01/20 1615)  BP: (!) 110/57 (03/01/20 1615)  SpO2: (!) 94 % (03/01/20 1615) Vital Signs (24h Range):  Temp:  [97.6 °F (36.4 °C)-98.7 °F (37.1 °C)] 98 °F (36.7 °C)  Pulse:  [] 74  Resp:  [11-26] 17  SpO2:  [91 %-98 %] 94 %  BP: ()/(34-69) 110/57   Weight: 91.5 kg (201 lb 13 oz)  Body mass index is 29.8 kg/m².      Intake/Output Summary (Last 24 hours) at 3/1/2020 1706  Last data filed at 3/1/2020 1600  Gross per 24 hour   Intake 6850.8 ml   Output 7265 ml   Net -414.2 ml       Physical Exam   Constitutional: He appears well-developed. No distress.   HENT:   Head: Normocephalic and atraumatic.   Eyes: Scleral icterus is present.   Cardiovascular: Intact distal pulses. An irregularly irregular rhythm present.   Clean dressing located across central chest from recent CABG . No redness or drainage around the area   Pulmonary/Chest: No respiratory distress. He has no wheezes. He has rales.   R. IJ  in place   Abdominal: Soft. He exhibits no mass. There is tenderness (generalized).   Musculoskeletal: He exhibits no tenderness.   Neurological: He is alert. He is disoriented.   Skin: Skin is warm. There is erythema (Ecchymosis in R. lower extremity).   jaundice   Psychiatric: He is agitated.       Vents:     Lines/Drains/Airways     Peripherally Inserted Central Catheter Line            PICC Double Lumen left brachial -- days          Central Venous Catheter Line            Trialysis (Dialysis) Catheter 02/27/20 1820 right internal jugular  2 days          Drain                 NG/OG Tube 02/28/20 0700 Right nostril 2 days         Fecal Incontinence  02/29/20 0300 1 day              Significant Labs:    CBC/Anemia Profile:  Recent Labs   Lab 02/29/20  0331 03/01/20  0345   WBC 18.20* 15.43*   HGB 7.8* 8.1*   HCT 25.8* 26.9*   PLT 39* 30*   * 110*   RDW 24.9* 25.7*        Chemistries:  Recent Labs   Lab 02/29/20  0331 02/29/20  2225 03/01/20  0345 03/01/20  1249     139   < > 139 139 138   K 4.4  4.4   < > 4.5 4.6 4.7     104   < > 106 107 107   CO2 20*  20*   < > 18* 18* 17*   BUN 21  21   < > 8 6* 5*   CREATININE 1.5*  1.5*   < > 0.8 0.8 0.7   CALCIUM 8.5*  8.5*   < > 8.6* 8.6* 8.4*   ALBUMIN 3.3*  3.3*   < > 3.1* 3.1* 2.8*   PROT 5.8*  --   --  5.5*  --    BILITOT 18.4*  --   --  20.4*  --    ALKPHOS 231*  --   --  266*  --    *  --   --  269*  --    *  --   --  786*  --    MG 2.0   < > 1.9 2.0 2.0   PHOS 4.2  4.2   < > 2.2* 2.5* 2.0*    < > = values in this interval not displayed.       Lactic Acid:   Recent Labs   Lab 02/29/20  0331 02/29/20  1028 02/29/20  1519   LACTATE 3.6* 6.2* 4.7*       Significant Imaging:  n/a

## 2020-03-01 NOTE — PLAN OF CARE
CMICU DAILY GOALS       A: Awake    RASS: Goal - RASS Goal: 0-->alert and calm  Actual - RASS (Salguero Agitation-Sedation Scale): 0-->alert and calm   Restraint necessity: Clinical Justification: Removing medical devices  B: Breath   SBT: Not intubated   C: Coordinate A & B, analgesics/sedatives   Pain: managed    SAT: Not intubated  D: Delirium   CAM-ICU: Overall CAM-ICU: Positive  E: Early Mobility   MOVE Screen: Fail   Activity: Activity Management: activity adjusted per tolerance  FAS: Feeding/Nutrition   Diet order: Diet/Nutrition Received: tube feeding, NPO,   Fluid restriction:    T: Thrombus   DVT prophylaxis: VTE Required Core Measure: Pharmacological prophylaxis initiated/maintained  H: HOB Elevation   Head of Bed (HOB): HOB at 30-45 degrees  U: Ulcer Prophylaxis   GI: yes  G: Glucose control   managed    S: Skin   Bundle compliance: yes   Bathing/Skin Care: bath, chlorhexidine, bath, complete, dressed/undressed, incontinence care, linen changed Date: 2/29/20 AM  B: Bowel Function   Incontinence    I: Indwelling Catheters   [place] necessity: [REMOVED]      Urethral Catheter 02/27/20 1300-Reason for Continuing Urinary Catheterization: Critically ill in ICU requiring intensive monitoring   CVC necessity: Yes   IPAD offered: Not appropriate  D: De-escalation Antibx   No  Plan for the day   CRRT, monitor hemodynamics, HR and BP control with pressors   Family/Goals of care/Code Status   Code Status: DNR     No acute events throughout day, VS and assessment per flow sheet, patient progressing towards goals as tolerated, plan of care reviewed with Sushil Mcmahon and family, all concerns addressed, will continue to monitor.

## 2020-03-01 NOTE — PROGRESS NOTES
CRRT DAILY CHECKS  Restarted after a machine change. On SLED continuous with uf rate at 300c/hr. Orders verified and carried out.    See flow sheet

## 2020-03-01 NOTE — ASSESSMENT & PLAN NOTE
CABG in Feb, 2020 with post-op complication of pneumothorax and pleural effusion.Bradycardia on the floor  Chest xray with pulmonary edema   EKG with sinus bradycardia. Now Atrial fibrillation with RVR, HR 140s  Troponin with flat trend  2D ECHO: EF 65%, no wall motion abnormalities, indeterminate L.V diastolic dysfunction  Added Vasopressin to maintain MAP goal of 85

## 2020-03-01 NOTE — PROGRESS NOTES
Nephrology Progress Note    Patient followed for SALOMON dialysis dependent, Acute liver failure.  Patient was seen on RRT / SLED which was running for uremic toxin clearance / UF for volume management.    Vitals:    03/01/20 0701   BP: (!) 107/52   Pulse: 83   Resp: 18   Temp: 98.7 °F (37.1 °C)       CMP  Sodium   Date Value Ref Range Status   03/01/2020 139 136 - 145 mmol/L Final     Potassium   Date Value Ref Range Status   03/01/2020 4.6 3.5 - 5.1 mmol/L Final     Chloride   Date Value Ref Range Status   03/01/2020 107 95 - 110 mmol/L Final     CO2   Date Value Ref Range Status   03/01/2020 18 (L) 23 - 29 mmol/L Final     Glucose   Date Value Ref Range Status   03/01/2020 82 70 - 110 mg/dL Final     BUN, Bld   Date Value Ref Range Status   03/01/2020 6 (L) 8 - 23 mg/dL Final     Creatinine   Date Value Ref Range Status   03/01/2020 0.8 0.5 - 1.4 mg/dL Final     Calcium   Date Value Ref Range Status   03/01/2020 8.6 (L) 8.7 - 10.5 mg/dL Final     Total Protein   Date Value Ref Range Status   03/01/2020 5.5 (L) 6.0 - 8.4 g/dL Final     Albumin   Date Value Ref Range Status   03/01/2020 3.1 (L) 3.5 - 5.2 g/dL Final     Total Bilirubin   Date Value Ref Range Status   03/01/2020 20.4 (H) 0.1 - 1.0 mg/dL Final     Comment:     For infants and newborns, interpretation of results should be based  on gestational age, weight and in agreement with clinical  observations.  Premature Infant recommended reference ranges:  Up to 24 hours.............<8.0 mg/dL  Up to 48 hours............<12.0 mg/dL  3-5 days..................<15.0 mg/dL  6-29 days.................<15.0 mg/dL       Alkaline Phosphatase   Date Value Ref Range Status   03/01/2020 266 (H) 55 - 135 U/L Final     AST   Date Value Ref Range Status   03/01/2020 786 (H) 10 - 40 U/L Final     ALT   Date Value Ref Range Status   03/01/2020 269 (H) 10 - 44 U/L Final     Anion Gap   Date Value Ref Range Status   03/01/2020 14 8 - 16 mmol/L Final     eGFR if     Date Value Ref Range Status   03/01/2020 >60.0 >60 mL/min/1.73 m^2 Final     eGFR if non    Date Value Ref Range Status   03/01/2020 >60.0 >60 mL/min/1.73 m^2 Final     Comment:     Calculation used to obtain the estimated glomerular filtration  rate (eGFR) is the CKD-EPI equation.          CBC  Lab Results   Component Value Date    WBC 15.43 (H) 03/01/2020    HGB 8.1 (L) 03/01/2020    HCT 26.9 (L) 03/01/2020     (H) 03/01/2020    PLT 30 (LL) 03/01/2020         Intake/Output Summary (Last 24 hours) at 3/1/2020 0733  Last data filed at 3/1/2020 0701  Gross per 24 hour   Intake 6774.88 ml   Output 7418 ml   Net -643.12 ml         Patient hemodynamically not stable for intermittent HD yet.  Will continue RRT / SLED for volume and toxin clearance.  SLED prescription and dialysate baths were reviewed and changes made according to most recent labs.   Patient getting ~2L IV input daily.  Will continue RRt with -300 mL/hr as tolerated, keep MAP >65.      Follow labs serially while on RRT / SLED to monitor electrolytes and follow replacement protocol as needed.     Estuardo Hogue MD  Nephrology  [facility]

## 2020-03-01 NOTE — PLAN OF CARE
CMICU DAILY GOALS       A: Awake    RASS: Goal - RASS Goal: 0-->alert and calm  Actual - RASS (Salguero Agitation-Sedation Scale): -1-->drowsy   Restraint necessity: Clinical Justification: Removing medical devices  B: Breath   SBT: Not intubated   C: Coordinate A & B, analgesics/sedatives   Pain: managed    SAT: Not intubated  D: Delirium   CAM-ICU: Overall CAM-ICU: Positive  E: Early Mobility   MOVE Screen: Fail   Activity: Activity Management: activity clustered for rest period  FAS: Feeding/Nutrition   Diet order: Diet/Nutrition Received: NPO, tube feeding,   Fluid restriction:    T: Thrombus   DVT prophylaxis: VTE Required Core Measure: Pharmacological prophylaxis initiated/maintained  H: HOB Elevation   Head of Bed (HOB): HOB at 30-45 degrees  U: Ulcer Prophylaxis   GI: yes  G: Glucose control   managed    S: Skin   Bundle compliance: yes   Bathing/Skin Care: bath, chlorhexidine, bath, complete, dressed/undressed, incontinence care, linen changed Date: 3/1/20 AM  B: Bowel Function   Incontinence    I: Indwelling Catheters   [place] necessity: [REMOVED]      Urethral Catheter 02/27/20 1300-Reason for Continuing Urinary Catheterization: Critically ill in ICU requiring intensive monitoring   CVC necessity: Yes   IPAD offered: Not appropriate  D: De-escalation Antibx   No  Plan for the day   Continue with plan of care, GOC conference with family possibly Tuesday  Family/Goals of care/Code Status   Code Status: DNR     No acute events throughout day, VS and assessment per flow sheet, patient progressing towards goals as tolerated, plan of care reviewed with Sushil Mcmahon and family, all concerns addressed, will continue to monitor.

## 2020-03-01 NOTE — PROGRESS NOTES
Pharmacokinetic Assessment Follow Up: IV Vancomycin    Vancomycin Assessment/Plan:  · Vancomycin 1.5 g x 1 administered yesterday at 0813. Random level with AM labs (~19.5 hour level) resulted at 15.7 mcg/mL.  · Administer 1.5 g x 1 now. Continue to pulse dose while patient remains on RRT.  · Vancomycin random ordered with AM labs for Monday 3/2. Additional doses will depend on RRT plans and level.    Drug levels (last 3 results):  Recent Labs   Lab Result Units 02/29/20  0331 03/01/20  0345   Vancomycin, Random ug/mL 12.5 15.7       Pharmacy will continue to follow and monitor vancomycin. Please contact pharmacy for questions regarding this assessment.    Thank you for the consult,   Nona Nathan, PharmD, [facility]  Critical Care Clinical Pharmacist  Spectralink: [phone]  _______________________________________________________________________________________________________________________     Patient brief summary:  Sushil Mcmahon is a 69 y.o. male initiated on antimicrobial therapy with IV Vancomycin for treatment of sepsis    The patient's current regimen is pulse dosing.    Drug Allergies:   Review of patient's allergies indicates:   Allergen Reactions    Lisinopril        Actual Body Weight:   91.5 kg    Renal Function:   Estimated Creatinine Clearance: 98.8 mL/min (based on SCr of 0.8 mg/dL).,     Dialysis Method (if applicable):  CRRT - SLED    CBC (last 72 hours):  Recent Labs   Lab Result Units 02/27/20  1013 02/27/20  2232 02/28/20  0315 02/29/20  0331 03/01/20  0345   WBC K/uL 13.99*  12.80* 16.86* 16.50* 18.20* 15.43*   Hemoglobin g/dL 8.0*  8.0* 8.0* 8.8* 7.8* 8.1*   Hemoglobin A1C % 5.9*  --   --   --   --    Hematocrit % 25.4*  25.2* 26.6* 28.2* 25.8* 26.9*   Platelets K/uL 71*  68* 74* 73* 39* 30*   Gran% % 82.8*  80.2* 80.8* 85.8* 82.6* 76.0*   Lymph% % 8.0*  10.2* 9.2* 6.3* 6.8* 9.3*   Mono% % 8.0  8.0 8.8 6.7 9.5 12.8   Eosinophil% % 0.5  1.0 0.3 0.4 0.3 0.8   Basophil% % 0.1  0.1 0.1 0.1  0.1 0.1   Differential Method  Automated  Automated Automated Automated Automated Automated       Metabolic Panel (last 72 hours):  Recent Labs   Lab Result Units 02/27/20  1013 02/27/20 2027 02/28/20  0315 02/28/20  1312 02/29/20  0331 02/29/20  1301 02/29/20  2225 03/01/20  0345   Sodium mmol/L 136  137 135* 136  136 135* 139  139 136 139 139   Potassium mmol/L 4.8  4.5 5.0 4.7  4.7 4.9 4.4  4.4 4.7 4.5 4.6   Chloride mmol/L 98  99 98 100  100 101 104  104 104 106 107   CO2 mmol/L 22*  21* 19* 18*  18* 18* 20*  20* 17* 18* 18*   Glucose mg/dL 94  118* 72 49*  49* 100 76  76 95 89 82   BUN, Bld mg/dL 172*  162* 173* 89*  89* 77* 21  21 17 8 6*   Creatinine mg/dL 5.7*  5.4* 5.7* 3.3*  3.3* 3.5* 1.5*  1.5* 1.4 0.8 0.8   Albumin g/dL 3.8  4.0 3.8 4.1  4.1 3.5 3.3*  3.3* 3.2* 3.1* 3.1*   Total Bilirubin mg/dL 14.2*  13.5*  --  17.9*  --  18.4*  --   --  20.4*   Alkaline Phosphatase U/L 205*  202*  --  242*  --  231*  --   --  266*   AST U/L 73*  66*  --  417*  --  774*  --   --  786*   ALT U/L 28  26  --  114*  --  227*  --   --  269*   Magnesium mg/dL  --  2.6 2.3 2.1 2.0 1.9 1.9 2.0   Phosphorus mg/dL  --  8.0* 4.3  4.3 5.0* 4.2  4.2 3.1 2.2* 2.5*       Vancomycin Administrations:  vancomycin given in the last 96 hours                   vancomycin 1.5 g in dextrose 5 % 250 mL IVPB (ready to mix) (mg) 1,500 mg New Bag 02/28/20 0936                Microbiologic Results:  Microbiology Results (last 7 days)     Procedure Component Value Units Date/Time    Blood culture [[id number]] Collected:  02/27/20 1616    Order Status:  Completed Specimen:  Blood from Peripheral, Hand, Right Updated:  02/29/20 1812     Blood Culture, Routine No Growth to date      No Growth to date      No Growth to date    Blood culture [[id number]] Collected:  02/27/20 1609    Order Status:  Completed Specimen:  Blood from Peripheral, Antecubital, Right Updated:  02/29/20 1812     Blood Culture, Routine No Growth to  date      No Growth to date      No Growth to date    Respiratory Infection Panel, Nasopharyngeal [[id number]] Collected:  02/27/20 2225    Order Status:  Completed Specimen:  Nasopharyngeal Swab Updated:  02/28/20 0002     Respiratory Infection Panel Source NP Swab     Adenovirus Not Detected     Coronavirus 229E, Common Cold Virus Not Detected     Coronavirus HKU1, Common Cold Virus Not Detected     Coronavirus NL63, Common Cold Virus Not Detected     Coronavirus OC43, Common Cold Virus Not Detected     Comment: The Coronavirus strains detected in this test cause the common cold.  These strains are not the COVID-19 (novel Coronavirus)strain   associated with the respiratory disease outbreak.          Human Metapneumovirus Not Detected     Human Rhinovirus/Enterovirus Not Detected     Influenza A (subtypes H1, H1-2009,H3) Not Detected     Influenza B Not Detected     Parainfluenza Virus 1 Not Detected     Parainfluenza Virus 2 Not Detected     Parainfluenza Virus 3 Not Detected     Parainfluenza Virus 4 Not Detected     Respiratory Syncytial Virus Not Detected     Bordetella Parapertussis (FO3816) Not Detected     Bordetella pertussis (ptxP) Not Detected     Chlamydia pneumoniae Not Detected     Mycoplasma pneumoniae Not Detected     Comment: Respiratory Infection Panel testing performed by Multiplex PCR.       Narrative:       For all other respiratory sources order [id number] Respiratory  Viral Panel by PCR (RVPCR)    Influenza A & B by Molecular [[id number]] Collected:  02/27/20 2225    Order Status:  Completed Specimen:  Nasopharyngeal Swab Updated:  02/27/20 2308     Influenza A, Molecular Negative     Influenza B, Molecular Negative     Flu A & B Source Nasal swab

## 2020-03-01 NOTE — NURSING
Eli VIGIL at bedside. Pt HR sustained ~110-120s with brief jumps to 130-140s for 2-3 seconds duration. Pt remains in a fib RVR at this time. MAP 87 currently. New orders for MAP goal 75 for better HR control. Will titrate levo gtt as needed. WCTM.

## 2020-03-01 NOTE — NURSING
1343: Pt experienced hypotensive episode MAP 52, levo gtt increased to .0.25. Pt restless, moaning, precedex gtt increased to 1. Bedside EKG shows pt in a fib RVR, MAP 72. HR sustained 160s for ~1 minute. Eli VIGIL called to bedside.    1440: Metoprolol x3 doses given. HR remains 120-150s a fib RVR. Eli VIGIL at bedside.     1444: Pt HR 56. Bedside 12 Lead EKG performed.     1446: Pt HR returned to 120-140s a fib RVR    1450: Orders placed for amio gtt. See vitals per flowsheet

## 2020-03-01 NOTE — ASSESSMENT & PLAN NOTE
Decompensated cirrhosis with anuric SALOMON, sCr 5.4, unclear baseline however sCr was 1.0 in January, 2020. Possible HRS vs ATN  Renal US without acute abnormalities   Nephrology Consult ,Continue SLED  BUN improved , now 5  Still anuric

## 2020-03-01 NOTE — ASSESSMENT & PLAN NOTE
Patient with HR in 150's sustained. S/p Metoprolol 5mg IV x 3 doses    Plan  - Start Amiodarone ggt  - No anticoagulation due to PLT count of 30

## 2020-03-01 NOTE — ASSESSMENT & PLAN NOTE
68 yo male with decompensated THOMAS cirrhosis here for transplant evaluation   Ascites and esophageal varices - paracentesis and banding recently   Liver US with cirrhosis and hepatic lesions (possible HCC)  Elevated PT/INR with thrombocytopenia, mild elevation of AST/ALT     MELD-Na score: 33 at 3/1/2020 12:49 PM  MELD score: 33 at 3/1/2020 12:49 PM  Calculated from:  Serum Creatinine: 0.7 mg/dL (Rounded to 1 mg/dL) at 3/1/2020 12:49 PM  Serum Sodium: 138 mmol/L (Rounded to 137 mmol/L) at 3/1/2020 12:49 PM  Total Bilirubin: 20.4 mg/dL at 3/1/2020  3:45 AM  INR(ratio): 3.8 at 3/1/2020  3:45 AM  Age: 69 years    Plan:  Lactulose/rifaxamine   Lactulose enema started  Possible SBP but unsafe pocket for paracentesis. IR can do paracentesis on Monday, if needed  Continue Vancomycin and Zosyn  CT abdomen without acute infectious process  Hepatology following with continue to monitor. Not a liver transplant candidate at this time due to hemodynamic instablitiy  Worsening AST//269, INR 3.8, TBILI 20.4.   Vitamin K 10mg IV for 3 days  Patient with encephalopathy, likely hepatic. Will order 24 EEG to r/o seizures. Continue Lactulose enema and Rifaximin.

## 2020-03-02 NOTE — ASSESSMENT & PLAN NOTE
Decompensated cirrhosis with anuric SALOMON, sCr 5.4, unclear baseline however sCr was 1.0 in January, 2020. Possible HRS vs ATN  Renal US without acute abnormalities   Nephrology Consult ,Continue SLED  BUN improved   Still anuric

## 2020-03-02 NOTE — ASSESSMENT & PLAN NOTE
68 yo male with decompensated THOMAS cirrhosis here for transplant evaluation   Ascites and esophageal varices - paracentesis and banding recently   Liver US with cirrhosis and hepatic lesions (possible HCC)      MELD-Na score: 36 at 3/2/2020  3:45 AM  MELD score: 36 at 3/2/2020  3:45 AM  Calculated from:  Serum Creatinine: 0.5 mg/dL (Rounded to 1 mg/dL) at 3/2/2020  3:45 AM  Serum Sodium: 138 mmol/L (Rounded to 137 mmol/L) at 3/2/2020  3:45 AM  Total Bilirubin: 22.9 mg/dL at 3/2/2020  3:45 AM  INR(ratio): 4.7 at 3/2/2020  3:45 AM  Age: 69 years    Plan:  Lactulose/rifaxamine   Lactulose enema started  Possible SBP but unsafe pocket for paracentesis. IR can do paracentesis on Monday, if needed  Continue Vancomycin and Zosyn  CT abdomen without acute infectious process  Hepatology following with continue to monitor. Not a liver transplant candidate at this time due to hemodynamic instablitiy  Worsening AST//329, INR 4.7, TBILI 22.9.   Vitamin K 10mg IV for 3 days  Patient with encephalopathy, likely hepatic.  24 EEG to r/o seizures. Continue Lactulose enema and Rifaximin.  Ammonia 38.

## 2020-03-02 NOTE — ASSESSMENT & PLAN NOTE
68 yo male with s/p recent CABAG and decompensated cirrhosis with history of ascites and esophageal varices here for liver transplant evaluation now with acute hypoxia.     Post CABG complication with pneumothorax and pleural effusion from OSH. Pneumothorax resolved, chest tube removed at OSH.   Thoracentesis at OSH with 600 cc removed    Repeat chest xray with evidence of pulmonary edema with possible loculation on left and opacity on right. Possible ADHF vs aspiration pneumonia/pneumontitis vs hepatic hydrothorax vs volume over load from ARF    Plan:  - CXR with worsening pulmonary infiltrates, volume optimization via dialysis. CXR 2/29 mildy improved but still with b/l pulmonary infiltrates  - Continue Vancomycin and Zosyn  - Flu negative, RIP negative  - Wean off oxygen as tolerated

## 2020-03-02 NOTE — PROGRESS NOTES
[facility]  Critical Care Medicine  Progress Note    Patient Name: Sushil Mcmahon  MRN: [id number]  Admission Date: 2/27/2020  Hospital Length of Stay: 4 days  Code Status: DNR  Attending Provider: Heather Hunt MD  Primary Care Provider: Denis Fulton MD   Principal Problem: Decompensated HCV cirrhosis    Subjective:     HPI:  Mr. Mcmahon is a 70yo male with cirrhosis/liver failure due to THOMAS, history of hepatic encephalopathy, variceal bleeding, diabetes, hypothyroidism, anxiety/depression, hypotension, A-fib, recent CABG 2/11/2020 who presents as a transfer from [facility] in [place] for liver transplant evaluation. Per notes, patient was previously worked up with liver transplant but was not a candidate due to multiple comorbidities including multi vessel coronary artery disease which required CABG. He was deemed high risk due to multiple comorbidities, also turned down at [facility] where he presented for a second opinion. He eventually went to [facility] were he got a 4- vessel CABG on 2/11. The procedure was complicated by pneumothorax, SALOMON and decompensated liver failure with (MELD 30). He was stabilized and transferred to [facility] for transplant hepatology evaluation, however he was denied candidacy. Patient was then transferred to [facility] 2/27 for higher level of care and liver transplant evaluation. Critical care consulted for hypoxia, encephalopathy and acute renal failure.    On evaluation, patient was encephalopathic and  appeared in distress 2/2 to pain, saturating 86% in 15L n/c. Per wife, patient has been experiencing worse pain in the past few days. The pain is nonspecifically located in his groin/pelvic region. He was also reported to be anuric since arrival at with [facility]. Labs significant for WBC 12.80, HGB 8.0, PLT 6.8, BUN/Cr 162/5.4, lactic acid of 2.5. Patient was given a dose of dilaudid on the floor, after  which he became difficult to arouse, this was subsequently treated with Narcan and patient responded appropriately.     Hospital/ICU Course:  Mr. Mcmahon was admitted to MICU for acute metabolic encephalopathy, hypoxia and acute respiratory failure. Patient was started on HD for for uremia, oliguria and renal failure. CT head was ordered to evaluate for worsening encephalopathy, which was unremarkable. CT abd/pelvis revealed b/l pleural effusions and a cirrhotic liver with prominent lymph nodes. Patient was started on broad spectrum antibiotics to cover for SBP & PNA and septic shock. He was also started on Norepinephrine to maintain MAPs of 65. Patient also started on lactulose enema's for hepatic encephalopathy. Patient with no improvement in mental status despite having bowel movements on the enema. He was started on Precedex for agitation. Patient with worsening liver function 2/29, added on Vasopressin for MAP goal of 85. Patient still with worsening liver function, having paroxysmal atrial fibrillation. Metoprolol 5 mg IV x 3 ordered for HR sustained in 150's, subsequent bradycardia to the 40's. Amiodarone ggt started for rate control. Discussion with wife on goals of care, she stated she will like to give him till Tuesday, 3/3/2020 before making a final decision on withdrawing care. Pt placed on 24hr EEG monitoring. Patient now requiring 3 vasopressors to maintain blood pressure.    Interval History/Significant Events: Patient with hypotension and hypoxia during HD change, required bagging overnight to maintain O2 satS.     Review of Systems   Unable to perform ROS: Mental status change     Objective:     Vital Signs (Most Recent):  Temp: 99.5 °F (37.5 °C) (03/02/20 0701)  Pulse: (!) 114 (03/02/20 0800)  Resp: (!) 23 (03/02/20 0800)  BP: (!) 92/53 (03/02/20 0800)  SpO2: (!) 87 % (03/02/20 0800) Vital Signs (24h Range):  Temp:  [98 °F (36.7 °C)-99.5 °F (37.5 °C)] 99.5 °F (37.5 °C)  Pulse:  [] 114  Resp:   [13-31] 23  SpO2:  [86 %-98 %] 87 %  BP: ()/(34-66) 92/53   Weight: 91.5 kg (201 lb 13 oz)  Body mass index is 29.8 kg/m².      Intake/Output Summary (Last 24 hours) at 3/2/2020 0806  Last data filed at 3/2/2020 0800  Gross per 24 hour   Intake 8594.39 ml   Output 7163 ml   Net 1431.39 ml       Physical Exam   Constitutional: He appears well-developed. No distress.   HENT:   Head: Normocephalic and atraumatic.   Eyes: Scleral icterus is present.   Cardiovascular: Intact distal pulses. An irregularly irregular rhythm present.   Clean dressing located across central chest from recent CABG . No redness or drainage around the area   Pulmonary/Chest: No respiratory distress. He has no wheezes. He has rales.   R. IJ  in place   Abdominal: Soft. He exhibits no mass. There is tenderness (generalized).   Musculoskeletal: He exhibits no tenderness.   Neurological: He is alert. He is disoriented.   Skin: Skin is warm. There is erythema (Ecchymosis in R. lower extremity).   jaundice   Psychiatric: He is agitated.       Vents:  Oxygen Concentration (%): 100 (03/02/20 0800)  Lines/Drains/Airways     Peripherally Inserted Central Catheter Line            PICC Double Lumen left brachial -- days          Central Venous Catheter Line            Trialysis (Dialysis) Catheter 02/27/20 1820 right internal jugular 3 days          Drain                 NG/OG Tube 02/28/20 0700 Right nostril 3 days         Fecal Incontinence  02/29/20 0300 2 days              Significant Labs:    CBC/Anemia Profile:  Recent Labs   Lab 03/01/20  0345 03/02/20  0345   WBC 15.43* 15.13*   HGB 8.1* 7.8*   HCT 26.9* 26.7*   PLT 30* 31*   * 110*   RDW 25.7* 26.7*        Chemistries:  Recent Labs   Lab 03/01/20  0345 03/01/20  1249 03/01/20  2147 03/02/20  0345    138 140 138  138   K 4.6 4.7 4.8 4.7  4.7    107 107 105  105   CO2 18* 17* 18* 16*  16*   BUN 6* 5* 4* 4*  4*   CREATININE 0.8 0.7 0.6 0.5  0.5   CALCIUM 8.6*  8.4* 8.7 8.7  8.7   ALBUMIN 3.1* 2.8* 2.8* 2.8*  2.8*   PROT 5.5*  --   --  5.3*   BILITOT 20.4*  --   --  22.9*   ALKPHOS 266*  --   --  276*   *  --   --  329*   *  --   --  970*   MG 2.0 2.0 2.0 2.0   PHOS 2.5* 2.0* 1.8* 1.9*  1.9*       Lactic Acid:   Recent Labs   Lab 02/29/20  1519 03/01/20  1742 03/01/20  2323   LACTATE 4.7* 8.5* 8.2*       Significant Imaging:  N/A      ABG  Recent Labs   Lab 03/01/20  0902   PH 7.446   PO2 34*   PCO2 28.3*   HCO3 19.5*   BE -5     Assessment/Plan:     Pulmonary  Acute respiratory failure with hypoxia  70 yo male with s/p recent CABAG and decompensated cirrhosis with history of ascites and esophageal varices here for liver transplant evaluation now with acute hypoxia.     Post CABG complication with pneumothorax and pleural effusion from OSH. Pneumothorax resolved, chest tube removed at OSH.   Thoracentesis at OSH with 600 cc removed    Repeat chest xray with evidence of pulmonary edema with possible loculation on left and opacity on right. Possible ADHF vs aspiration pneumonia/pneumontitis vs hepatic hydrothorax vs volume over load from ARF    Plan:  - CXR with worsening pulmonary infiltrates, volume optimization via dialysis. CXR 2/29 mildy improved but still with b/l pulmonary infiltrates  - Continue Vancomycin and Zosyn  - Flu negative, RIP negative  - Wean off oxygen as tolerated        Cardiac/Vascular  Paroxysmal atrial fibrillation with rapid ventricular response  Patient with HR in 150's sustained. S/p Metoprolol 5mg IV x 3 doses    Plan  - Continue Amiodarone ggt  - No anticoagulation due to PLT count of 30    Coronary artery disease involving coronary bypass graft of native heart with angina pectoris  CABG in Feb, 2020 with post-op complication of pneumothorax and pleural effusion.Bradycardia on the floor  Chest xray with pulmonary edema   EKG with Atrial fibrillation with RVR, HR 140s  Troponin with flat trend  2D ECHO: EF 65%, no wall motion  abnormalities, indeterminate L.V diastolic dysfunction  Added Vasopressin & phenylephrine to maintain MAP goal of 65    Renal/  Penile pain  Urology consulted, recommended felix to be removed    SALOMON (acute kidney injury)  Decompensated cirrhosis with anuric SALOMON, sCr 5.4, unclear baseline however sCr was 1.0 in January, 2020. Possible HRS vs ATN  Renal US without acute abnormalities   Nephrology Consult ,Continue SLED  BUN improved   Still anuric    ID  Septic shock  Unknown etiology at this time, suspect SBP vs PNA. Currently requiring vasopressor support (Levo + Vaso + phenylephrine )  Lactic acid 8.2  Blood cultures NGTD  Continue  Vancomycin and Zosyn      Hematology  Thrombocytopenia  Severe thrombocytopenia 39. Likely secondary to cirrhosis, 120 in Jan, 2020  Daily CBC   Hold Heparin 5K Q8H      Endocrine  Severe malnutrition  Started on tube feeds    GI  * decompensated cirrhosis   70 yo male with decompensated THOMAS cirrhosis here for transplant evaluation   Ascites and esophageal varices - paracentesis and banding recently   Liver US with cirrhosis and hepatic lesions (possible HCC)      MELD-Na score: 36 at 3/2/2020  3:45 AM  MELD score: 36 at 3/2/2020  3:45 AM  Calculated from:  Serum Creatinine: 0.5 mg/dL (Rounded to 1 mg/dL) at 3/2/2020  3:45 AM  Serum Sodium: 138 mmol/L (Rounded to 137 mmol/L) at 3/2/2020  3:45 AM  Total Bilirubin: 22.9 mg/dL at 3/2/2020  3:45 AM  INR(ratio): 4.7 at 3/2/2020  3:45 AM  Age: 69 years    Plan:  Lactulose/rifaxamine   Lactulose enema started  Possible SBP but unsafe pocket for paracentesis. IR can do paracentesis on Monday, if needed  Continue Vancomycin and Zosyn  CT abdomen without acute infectious process  Hepatology following with continue to monitor. Not a liver transplant candidate at this time due to hemodynamic instablitiy  Worsening AST//329, INR 4.7, TBILI 22.9.   Vitamin K 10mg IV for 3 days  Patient with encephalopathy, likely hepatic.  24 EEG to r/o  seizures. Continue Lactulose enema and Rifaximin.  Ammonia 38.    Abdominal pain  Severe abdominal pain in the suprapubic and penile region. Unclear why. Abdo compartment syndrome? Bladder unremarkable on scan. SBP?  CT abdo WO contrast with findings of cirrhotic liver and prominent lymph nodes  Possible SBP but unsafe pocket for paracentesis.  Will treat with Vancomycin and Zosyn            Critical Care Daily Checklist:     A: Awake: RASS Goal/Actual Goal: RASS Goal: 0-->alert and calm  Actual: Salguero Agitation Sedation Scale (RASS): Light sedation   B: Spontaneous Breathing Trial Performed?     C: SAT & SBT Coordinated?  N/A                  D: Delirium: CAM-ICU Overall CAM-ICU: Positive   E: Early Mobility Performed? No   F: Feeding Goal: Goals: Meet % EEN, EPN by RD f/u date  Status: Nutrition Goal Status: new   Current Diet Order   No orders of the defined types were placed in this encounter.       AS: Analgesia/Sedation PRECEDEX   T: Thromboembolic Prophylaxis Hold for PLT < 30   H: HOB > 300 Yes   U: Stress Ulcer Prophylaxis (if needed) Protonix 40mg IV   G: Glucose Control     B: Bowel Function Stool Occurrence: 1   I: Indwelling Catheter (Lines & [place]) Necessity [place] removed   D: De-escalation of Antimicrobials/Pharmacotherapies Vanc & Zosyn     Plan for the day/ETD MICU     Code Status:  Family/Goals of Care:        Critical secondary to Patient has a condition that poses threat to life and bodily function: Acute Renal Failure, Acute encephalopathy      Critical care was time spent personally by me on the following activities: development of treatment plan with patient or surrogate and bedside caregivers, discussions with consultants, evaluation of patient's response to treatment, examination of patient, ordering and performing treatments and interventions, ordering and review of laboratory studies, ordering and review of radiographic studies, pulse oximetry, re-evaluation of patient's  condition. This critical care time did not overlap with that of any other provider or involve time for any procedures.     Nighat Benitez,   Critical Care Medicine  [facility]

## 2020-03-02 NOTE — PT/OT/SLP DISCHARGE
Physical Therapy Discharge Summary    Name: Sushil Mcmahon  MRN: [id number]   Principal Problem: Cirrhosis     Patient Discharged from acute Physical Therapy on 3/2/2020.  Please refer to prior PT noted date on 2020 for functional status.     Assessment:     Pt discharged this date  medical instability. Please re-consult if pt experiences a change in status.     Objective:     GOALS:   Multidisciplinary Problems     Physical Therapy Goals        Problem: Physical Therapy Goal    Goal Priority Disciplines Outcome Goal Variances Interventions   Physical Therapy Goal     PT, PT/OT Ongoing, Progressing     Description:  Goals to be met by: 3/13/2020    Patient will increase functional independence with mobility by performin. Supine to sit with Moderate Assistance - not met  2. Sit to stand transfer with Moderate Assistance - not met  3. Gait  x 10 feet with Moderate Assistance - not met  4. Ascend/descend 2 stair with Moderate Assistance - not met  5. Sitting at edge of bed x8 minutes with Contact Guard Assistance - not met  6. Lower extremity exercise program x15 reps per handout, with independence - not met                      Reasons for Discontinuation of Therapy Services  Decline in medical status, not appropriate for therapy intervention at this time.       Plan:     Patient Discharged to: ICU .    Deana Douglas, PT  3/2/2020

## 2020-03-02 NOTE — PROGRESS NOTES
Equipment  change due. Blood rinsed back and tx restarted on new machine. Daily checks preformed and orders verified.

## 2020-03-02 NOTE — SUBJECTIVE & OBJECTIVE
Interval History:  Patient remains intubated and sedated and on pressors.  Continues to be on SLED.  INR, LFTs continue to worsen.    Current Facility-Administered Medications   Medication    acetaminophen tablet 650 mg    albuterol-ipratropium 2.5 mg-0.5 mg/3 mL nebulizer solution 3 mL    albuterol-ipratropium 2.5 mg-0.5 mg/3 mL nebulizer solution 3 mL    amiodarone 360 mg/200 mL (1.8 mg/mL) infusion    atropine injection 0.5 mg    balsam peru-castor oil Oint    dexmedetomidine (PRECEDEX) 400mcg/100mL 0.9% NaCL infusion    dextrose 10% (D10W) Bolus    dextrose 10% (D10W) Bolus    glucagon (human recombinant) injection 1 mg    glucose chewable tablet 16 g    glucose chewable tablet 24 g    HYDROmorphone injection 0.5 mg    hyoscyamine 0.125 mg/mL solution 0.125 mg    lactulose 10 gram/15 mL solution (enema) 200 g    lactulose 20 gram/30 mL solution Soln 30 g    lidocaine HCl 2% urojet    midodrine tablet 15 mg    norepinephrine 32 mg in dextrose 5 % 250 mL infusion    ondansetron injection 4 mg    pantoprazole injection 40 mg    PHENYLephrine (SILVANA-SYNEPHRINE) 20 mg in sodium chloride 0.9% 250ml (titrating)    piperacillin-tazobactam 4.5 g in sodium chloride 0.9% 100 mL IVPB (ready to mix system)    rifAXIMin tablet 550 mg    sodium chloride 0.9% flush 10 mL    vancomycin - pharmacy to dose    vasopressin (PITRESSIN) 0.2 Units/mL in dextrose 5 % 100 mL infusion    zinc sulfate capsule 220 mg       Objective:     Vital Signs (Most Recent):  Temp: 99.3 °F (37.4 °C) (03/02/20 1101)  Pulse: (!) 137 (03/02/20 1300)  Resp: (!) 21 (03/02/20 1300)  BP: (!) 52/23 (03/02/20 1300)  SpO2: (!) 92 % (03/02/20 1300) Vital Signs (24h Range):  Temp:  [98 °F (36.7 °C)-99.5 °F (37.5 °C)] 99.3 °F (37.4 °C)  Pulse:  [] 137  Resp:  [13-31] 21  SpO2:  [86 %-98 %] 92 %  BP: ()/(23-66) 52/23     Weight: 91.5 kg (201 lb 13 oz) (02/29/20 0701)  Body mass index is 29.8 kg/m².    Physical Exam    Constitutional: He appears toxic. He is sedated and intubated.   HENT:   Head: Normocephalic.   Eyes: Conjunctivae are normal. Scleral icterus is present.   Neck: Normal range of motion. Neck supple.   Cardiovascular: Regular rhythm.   Pulmonary/Chest: He is intubated.   Abdominal: Bowel sounds are normal. He exhibits distension. He exhibits no mass. There is no tenderness. There is no guarding.   Musculoskeletal: Normal range of motion.   Neurological:   Sedated   Skin: Skin is warm and dry.       MELD-Na score: 36 at 3/2/2020  3:45 AM  MELD score: 36 at 3/2/2020  3:45 AM  Calculated from:  Serum Creatinine: 0.5 mg/dL (Rounded to 1 mg/dL) at 3/2/2020  3:45 AM  Serum Sodium: 138 mmol/L (Rounded to 137 mmol/L) at 3/2/2020  3:45 AM  Total Bilirubin: 22.9 mg/dL at 3/2/2020  3:45 AM  INR(ratio): 4.7 at 3/2/2020  3:45 AM  Age: 69 years    Significant Labs:  CBC:   Recent Labs   Lab 03/02/20  0345   WBC 15.13*   RBC 2.42*   HGB 7.8*   HCT 26.7*   PLT 31*     BMP:   Recent Labs   Lab 03/02/20 0345     107     138   K 4.7  4.7     105   CO2 16*  16*   BUN 4*  4*   CREATININE 0.5  0.5   CALCIUM 8.7  8.7     CMP:   Recent Labs   Lab 03/02/20  0345     107   CALCIUM 8.7  8.7   ALBUMIN 2.8*  2.8*   PROT 5.3*     138   K 4.7  4.7   CO2 16*  16*     105   BUN 4*  4*   CREATININE 0.5  0.5   ALKPHOS 276*   *   *   BILITOT 22.9*     Coagulation:   Recent Labs   Lab 03/02/20 0345   INR 4.7*       Significant Imaging:  Labs: Reviewed

## 2020-03-02 NOTE — PT/OT/SLP PROGRESS
Occupational Therapy      Patient Name:  Sushil Mcmahon   MRN:  [id number]    OT completed chart review, spoke with nursing in attempt to see pt.  Patient not seen today secondary to CRRT + EEG. Will follow-up per schedule as patient appropriate to participate with therapy.    Chante Torres, OT  3/2/2020

## 2020-03-02 NOTE — ASSESSMENT & PLAN NOTE
Severe abdominal pain in the suprapubic and penile region. Unclear why. Abdo compartment syndrome? Bladder unremarkable on scan. SBP?  CT abdo WO contrast with findings of cirrhotic liver and prominent lymph nodes  Possible SBP but unsafe pocket for paracentesis.  Will treat with Vancomycin and Zosyn

## 2020-03-02 NOTE — NURSING
10:15 Automatic BP unable to  due to low pulsation on bilateral upper and lower extremities. Doppler pressure detected at 70/40. HR a fib -150s. Eli VIGIL called to bedside. Orders to max all pressors and rinse back CRRT if tolerated due to pt respiratory distress when rinsed back this AM for machine change.     10:40 Pt now maxed on all pressors, MAP 54. . PRN Dilaudid given.     10:45 CCS team at bedside. Explained care to wife at bedside. Chaplain [facility] present. [facility].     10:55 Brief 6 beat run of V tach, HR 180s. MAP 62. SpO2 96% on 25L 100% comfort flow.

## 2020-03-02 NOTE — PROCEDURES
ICU EEG/VIDEO MONITORING REPORT    Sushil Mcmahon  [id number]  1950    DATE OF SERVICE: 3/1-2/2020    DATE OF ADMISSION: 2/27/2020  8:48 AM    ADMITTING PROVIDER: Keaton Richter MD    METHODOLOGY   Electroencephalographic (EEG) recording is with electrodes placed according to the International 10-20 placement system.  Thirty two (32) channels of digital signal are simultaneously recorded from the scalp and may include EKG, EMG, and/or eye monitors.   Recording band pass was 0.1 to 512 hz.  Digital video recording of the patient is simultaneously recorded with the EEG.  The nursing staff report clinical symptoms and may press an event button when the patient has symptoms of clinical interest to the treating physicians.  EEG and video recording is stored and archived in digital format.  The entire recording is visually reviewed and the times identified by computer analysis as being spikes or seizures are reviewed again.  Activation procedures which include photic stimulation, hyperventilation and instructing patients to perform simple task are done in selected patients.   Compresses spectral analysis (CSA) is also performed on the activity recorded from each individual channel.  This is displayed as a power display of frequencies from 0 to 30 Hz over time.   The CSA analysis is done and displayed continuously.  This is reviewed for asymmetries in power between homologous areas of the scalp and for presence of changes in power which canbe seen when seizures occur.  Sections of suspected abnormalities on the CSA is then compared with the original EEG recording.     Iggli software was also utilized in the review of this study.  This software suite analyzes the EEG recording in multiple domains.  Coherence and rhythmicity is computed to identify EEG sections which may contain organized seizures.  Each channel undergoes analysis to detect presence of spike and sharp waves which have special and morphological  characteristic of epileptic activity.  The routine EEG recording is converted from spacial into frequency domain.  This is then displayed comparing homologous areas to identify areas of significant asymmetry.  Algorithm to identify non-cortically generated artifact is used to separate eye movement, EMG and other artifact from the EEG.      Recording Times  Start on 3/1/2020, 17:05  Stop on 3/2/2020, 07:00    A total of 13 hours and 55 minutes of EEG was recorded.    EEG FINDINGS  Background activity:   The background rhythm was characterized by delta > theta (2-5 Hz) activity   No posterior dominant rhythm seen.   Symmetry and continuity: the background was continuous and symmetric    Sleep:   Not seen.    Activation procedures:   Not done    Abnormal activity:   Abundant bilateral periodic discharges (brioad sharp waves) of triphasic morphology are seen throughout the study.        EKG:   Irregular rhythm seen.    IMPRESSION:   This is an abnormal C-EEG due to the severe generalized slowing seen, suggestive of severe diffuse or multifocal cerebral dysfunction, likely of metabolic etiology.  No epileptiform activity or electrographic seizures seen.  Irregular heart rate noted on EKG.     CLINICAL CORRELATION IS RECOMMENDED.    Zena Sutherland MD, SILVER(), REYNALDO, JONNA.  Neurology-Epilepsy.  [facility].

## 2020-03-02 NOTE — NURSING
Spoke with CCS, ok to hold nursing task list orders at this time. Hold meds and labs.     Currently , BP 52/23 (31), SpO2 90% on 25 L 100% Comfort Flow, and RR 20. Levo @ 3, Ellis @ 5, Vaso @ 0.04, Amio @ 1, Precedex @ 1. Pt unresponsible, respirations labored. WCTM.

## 2020-03-02 NOTE — ASSESSMENT & PLAN NOTE
CABG in Feb, 2020 with post-op complication of pneumothorax and pleural effusion.Bradycardia on the floor  Chest xray with pulmonary edema   EKG with Atrial fibrillation with RVR, HR 140s  Troponin with flat trend  2D ECHO: EF 65%, no wall motion abnormalities, indeterminate L.V diastolic dysfunction  Added Vasopressin & phenylephrine to maintain MAP goal of 65

## 2020-03-02 NOTE — HPI
Mr. Sushil Mcmahon is a 69 y.o. male w/ PMH THOMAS cirrhosis (complicated by varices in Dec 2019, ascites requiring weekly paracentesis, hepatic encephalopathy), a-fib, T2DM, hypothyroidism, CAD s/p recent CABG 2/11/20 who was transferred from [place], [place]) to [facility] for OLT evaluation. Patient confused when hepatology saw the patient, most of the history was gathered from the wife and chart review. Patient was diagnosed with THOMAS cirrhosis somewhere around September 2019 and during his liver transplant evaluation/work up in [place] was found to have a multivessel CAD . He then was seen at [facility] in [place] for an evaluation for CABG and Liver transplant combined together at the same time but given his comorbidities, risk factors and high risk of mortality he was not considered a candidate there. He then subsequently ended up following up with cardiology and CTS in [place] and had a CABG on Feb 11/2020 at [facility] without a combined OLT. Patient was informed that the surgery is very high risk and he has a high chance of mortality post surgery but patient and family wanted to pursue it. S/P CABG patient continued to be hospitalized and post op complications including pneumothorax, acute renal failure and decompensating liver disease. He was then transferred to [facility] after the case was reviewed with transplant team to evaluate for OLT. Patient's baseline MELD initially a few months prior was 28 and it was 36 on arrival to [facility]. Hepatology consulted for decompensated cirrhosis requiring OLT evaluation.

## 2020-03-02 NOTE — ASSESSMENT & PLAN NOTE
Patient with HR in 150's sustained. S/p Metoprolol 5mg IV x 3 doses    Plan  - Continue Amiodarone ggt  - No anticoagulation due to PLT count of 30

## 2020-03-02 NOTE — SUBJECTIVE & OBJECTIVE
Interval History/Significant Events: Patient with hypotension and hypoxia during HD change, required bagging overnight to maintain O2 satS.     Review of Systems   Unable to perform ROS: Mental status change     Objective:     Vital Signs (Most Recent):  Temp: 99.5 °F (37.5 °C) (03/02/20 0701)  Pulse: (!) 114 (03/02/20 0800)  Resp: (!) 23 (03/02/20 0800)  BP: (!) 92/53 (03/02/20 0800)  SpO2: (!) 87 % (03/02/20 0800) Vital Signs (24h Range):  Temp:  [98 °F (36.7 °C)-99.5 °F (37.5 °C)] 99.5 °F (37.5 °C)  Pulse:  [] 114  Resp:  [13-31] 23  SpO2:  [86 %-98 %] 87 %  BP: ()/(34-66) 92/53   Weight: 91.5 kg (201 lb 13 oz)  Body mass index is 29.8 kg/m².      Intake/Output Summary (Last 24 hours) at 3/2/2020 0806  Last data filed at 3/2/2020 0800  Gross per 24 hour   Intake 8594.39 ml   Output 7163 ml   Net 1431.39 ml       Physical Exam   Constitutional: He appears well-developed. No distress.   HENT:   Head: Normocephalic and atraumatic.   Eyes: Scleral icterus is present.   Cardiovascular: Intact distal pulses. An irregularly irregular rhythm present.   Clean dressing located across central chest from recent CABG . No redness or drainage around the area   Pulmonary/Chest: No respiratory distress. He has no wheezes. He has rales.   R. IJ  in place   Abdominal: Soft. He exhibits no mass. There is tenderness (generalized).   Musculoskeletal: He exhibits no tenderness.   Neurological: He is alert. He is disoriented.   Skin: Skin is warm. There is erythema (Ecchymosis in R. lower extremity).   jaundice   Psychiatric: He is agitated.       Vents:  Oxygen Concentration (%): 100 (03/02/20 0800)  Lines/Drains/Airways     Peripherally Inserted Central Catheter Line            PICC Double Lumen left brachial -- days          Central Venous Catheter Line            Trialysis (Dialysis) Catheter 02/27/20 1820 right internal jugular 3 days          Drain                 NG/OG Tube 02/28/20 0700 Right nostril 3 days          Fecal Incontinence  02/29/20 0300 2 days              Significant Labs:    CBC/Anemia Profile:  Recent Labs   Lab 03/01/20  0345 03/02/20  0345   WBC 15.43* 15.13*   HGB 8.1* 7.8*   HCT 26.9* 26.7*   PLT 30* 31*   * 110*   RDW 25.7* 26.7*        Chemistries:  Recent Labs   Lab 03/01/20  0345 03/01/20  1249 03/01/20  2147 03/02/20  0345    138 140 138  138   K 4.6 4.7 4.8 4.7  4.7    107 107 105  105   CO2 18* 17* 18* 16*  16*   BUN 6* 5* 4* 4*  4*   CREATININE 0.8 0.7 0.6 0.5  0.5   CALCIUM 8.6* 8.4* 8.7 8.7  8.7   ALBUMIN 3.1* 2.8* 2.8* 2.8*  2.8*   PROT 5.5*  --   --  5.3*   BILITOT 20.4*  --   --  22.9*   ALKPHOS 266*  --   --  276*   *  --   --  329*   *  --   --  970*   MG 2.0 2.0 2.0 2.0   PHOS 2.5* 2.0* 1.8* 1.9*  1.9*       Lactic Acid:   Recent Labs   Lab 02/29/20  1519 03/01/20  1742 03/01/20  2323   LACTATE 4.7* 8.5* 8.2*       Significant Imaging:  N/A

## 2020-03-02 NOTE — PROGRESS NOTES
Nephrology Progress Note    Patient followed for Piter dialysis dependent, acute liver failure.  Patient was seen on RRT / SLED which was running for uremic toxin clearance / UF for volume management.    Vitals:    03/02/20 1130   BP: (!) 66/26   Pulse: (!) 175   Resp: 16   Temp:        CMP  Sodium   Date Value Ref Range Status   03/02/2020 138 136 - 145 mmol/L Final   03/02/2020 138 136 - 145 mmol/L Final     Potassium   Date Value Ref Range Status   03/02/2020 4.7 3.5 - 5.1 mmol/L Final   03/02/2020 4.7 3.5 - 5.1 mmol/L Final     Chloride   Date Value Ref Range Status   03/02/2020 105 95 - 110 mmol/L Final   03/02/2020 105 95 - 110 mmol/L Final     CO2   Date Value Ref Range Status   03/02/2020 16 (L) 23 - 29 mmol/L Final   03/02/2020 16 (L) 23 - 29 mmol/L Final     Glucose   Date Value Ref Range Status   03/02/2020 107 70 - 110 mg/dL Final   03/02/2020 107 70 - 110 mg/dL Final     BUN, Bld   Date Value Ref Range Status   03/02/2020 4 (L) 8 - 23 mg/dL Final   03/02/2020 4 (L) 8 - 23 mg/dL Final     Creatinine   Date Value Ref Range Status   03/02/2020 0.5 0.5 - 1.4 mg/dL Final   03/02/2020 0.5 0.5 - 1.4 mg/dL Final     Calcium   Date Value Ref Range Status   03/02/2020 8.7 8.7 - 10.5 mg/dL Final   03/02/2020 8.7 8.7 - 10.5 mg/dL Final     Total Protein   Date Value Ref Range Status   03/02/2020 5.3 (L) 6.0 - 8.4 g/dL Final     Albumin   Date Value Ref Range Status   03/02/2020 2.8 (L) 3.5 - 5.2 g/dL Final   03/02/2020 2.8 (L) 3.5 - 5.2 g/dL Final     Total Bilirubin   Date Value Ref Range Status   03/02/2020 22.9 (H) 0.1 - 1.0 mg/dL Final     Comment:     For infants and newborns, interpretation of results should be based  on gestational age, weight and in agreement with clinical  observations.  Premature Infant recommended reference ranges:  Up to 24 hours.............<8.0 mg/dL  Up to 48 hours............<12.0 mg/dL  3-5 days..................<15.0 mg/dL  6-29 days.................<15.0 mg/dL       Alkaline  Phosphatase   Date Value Ref Range Status   03/02/2020 276 (H) 55 - 135 U/L Final     AST   Date Value Ref Range Status   03/02/2020 970 (H) 10 - 40 U/L Final     ALT   Date Value Ref Range Status   03/02/2020 329 (H) 10 - 44 U/L Final     Anion Gap   Date Value Ref Range Status   03/02/2020 17 (H) 8 - 16 mmol/L Final   03/02/2020 17 (H) 8 - 16 mmol/L Final     eGFR if    Date Value Ref Range Status   03/02/2020 >60.0 >60 mL/min/1.73 m^2 Final   03/02/2020 >60.0 >60 mL/min/1.73 m^2 Final     eGFR if non    Date Value Ref Range Status   03/02/2020 >60.0 >60 mL/min/1.73 m^2 Final     Comment:     Calculation used to obtain the estimated glomerular filtration  rate (eGFR) is the CKD-EPI equation.      03/02/2020 >60.0 >60 mL/min/1.73 m^2 Final     Comment:     Calculation used to obtain the estimated glomerular filtration  rate (eGFR) is the CKD-EPI equation.          CBC  Lab Results   Component Value Date    WBC 15.13 (H) 03/02/2020    HGB 7.8 (L) 03/02/2020    HCT 26.7 (L) 03/02/2020     (H) 03/02/2020    PLT 31 (LL) 03/02/2020         Intake/Output Summary (Last 24 hours) at 3/2/2020 1140  Last data filed at 3/2/2020 1101  Gross per 24 hour   Intake 8788.02 ml   Output 6721 ml   Net 2067.02 ml         Patient hemodynamically not stable for intermittent HD yet.  SLED prescription and dialysate baths were reviewed and changes made according to most recent labs.   Patient now on three pressors, very poor prognosis.  Goals of care discussion with family and likely being transitioned to comfort care.  Awaiting full family to arrive.  Will continue to support for now as needed.  If Bp continue to decrease and vasopressor requirements increase, will have to discontinue RRt.  For now continue mainly for metabolic clearance.  -300 mL/hr as tolerated, keep MAP >65.      Follow labs serially while on RRT / SLED to monitor electrolytes and follow replacement protocol as needed.      Estuardo Hogue MD  Nephrology  [facility]

## 2020-03-02 NOTE — ASSESSMENT & PLAN NOTE
Patient is 61-year-old male with past medical history of THOMAS cirrhosis, complicated by esophageal varices, large volume ascites requiring weekly paracentesis, and hepatic encephalopathy, history of coronary artery disease who was declined in the past due to cardiac risk, presented with decompensation following a recent CABG in 02/11/2020.    Likely cause of decompensation is his recent surgery.  Unclear the patient has septic shock, however he has been covered by broad-spectrum IV antibiotics.  He also has severe acute kidney injury on renal replacement therapy.  Unfortunately, given his hemodynamic instability, he is not a candidate for liver transplant surgery.  Recommend continued evaluation for causes of shock, and broad-spectrum antibiotics.  Continue to discuss goals of care given poor prognosis.    -Decompensated Thomas cirrhosis likely due to recent CABG:   -patient is not a candidate for liver transplant given hemodynamic instability.   -continue IV antibiotics, workup for shock per primary team.  Follow up cultures.  -

## 2020-03-02 NOTE — HPI
Mr. Sushil Mcmahon is a 69 y.o. male w/ PMH THMOAS cirrhosis (complicated by varices in Dec 2019, ascites requiring weekly paracentesis, hepatic encephalopathy), a-fib, T2DM, hypothyroidism, CAD s/p recent CABG 2/11/20 who was transferred from [place], [place]) to [facility] for OLT evaluation. Patient confused when hepatology saw the patient, most of the history was gathered from the wife and chart review. Patient was diagnosed with THOMAS cirrhosis somewhere around September 2019 and during his liver transplant evaluation/work up in [place] was found to have a multivessel CAD . He then was seen at [facility] in [place] for an evaluation for CABG and Liver transplant combined together at the same time but given his comorbidities, risk factors and high risk of mortality he was not considered a candidate there. He then subsequently ended up following up with cardiology and CTS in [place] and had a CABG on Feb 11/2020 at [facility] without a combined OLT. Patient was informed that the surgery is very high risk and he has a high chance of mortality post surgery but patient and family wanted to pursue it. S/P CABG patient continued to be hospitalized and post op complications including pneumothorax, acute renal failure and decompensating liver disease. He was then transferred to [facility] after the case was reviewed with transplant team to evaluate for OLT. Patient's baseline MELD initially a few months prior was 28 and it was 36 on arrival to [facility]. Hepatology consulted for decompensated cirrhosis requiring OLT evaluation.

## 2020-03-02 NOTE — PROGRESS NOTES
Pharmacokinetic Assessment Follow Up: IV Vancomycin    Vancomycin Assessment/Plan:  · Vancomycin 1.5 g x 1 administered yesterday at 0823. Random level with AM labs today resulted at 9.2 mcg/mL.  · Administer 1.5 g x 1 now. Continue to pulse dose while patient remains on RRT.  · Vancomycin random ordered with AM labs tomorrow. Additional doses will depend on RRT plans and level.    Drug levels (last 3 results):  Recent Labs   Lab Result Units 02/29/20  0331 03/01/20  0345 03/02/20  0345   Vancomycin, Random ug/mL 12.5 15.7 19.2       Pharmacy will continue to follow and monitor vancomycin. Please contact pharmacy for questions regarding this assessment.    Thank you for the consult,   Gustavo Chen, PharmD, [facility]  Medical/Surgical ICU Clinical Pharmacist  Spectralink: [phone]  _________________________________________________________________     Patient brief summary:  Sushil Mcmahon is a 69 y.o. male initiated on antimicrobial therapy with IV Vancomycin for treatment of sepsis    The patient's current regimen is pulse dosing.    Drug Allergies:   Review of patient's allergies indicates:   Allergen Reactions    Lisinopril        Actual Body Weight:   91.5 kg    Renal Function:   Estimated Creatinine Clearance: 158 mL/min (based on SCr of 0.5 mg/dL).,     Dialysis Method (if applicable):  CRRT - SLED

## 2020-03-02 NOTE — NURSING
MD Benitez notified that pt NGT residuals 300 cc. Orders to hold tube feedings at this time and to hold meds via NGT this AM.

## 2020-03-02 NOTE — ASSESSMENT & PLAN NOTE
Unknown etiology at this time, suspect SBP vs PNA. Currently requiring vasopressor support (Levo + Vaso + phenylephrine )  Lactic acid 8.2  Blood cultures NGTD  Continue  Vancomycin and Zosyn

## 2020-03-02 NOTE — PROCEDURES
ICU EEG/VIDEO MONITORING REPORT    Sushil Mcmahon  [id number]  1950    DATE OF SERVICE: 3/1-2/2020    DATE OF ADMISSION: 2/27/2020  8:48 AM    ADMITTING PROVIDER: Keaton Richter MD    METHODOLOGY   Electroencephalographic (EEG) recording is with electrodes placed according to the International 10-20 placement system.  Thirty two (32) channels of digital signal are simultaneously recorded from the scalp and may include EKG, EMG, and/or eye monitors.   Recording band pass was 0.1 to 512 hz.  Digital video recording of the patient is simultaneously recorded with the EEG.  The nursing staff report clinical symptoms and may press an event button when the patient has symptoms of clinical interest to the treating physicians.  EEG and video recording is stored and archived in digital format.  The entire recording is visually reviewed and the times identified by computer analysis as being spikes or seizures are reviewed again.  Activation procedures which include photic stimulation, hyperventilation and instructing patients to perform simple task are done in selected patients.   Compresses spectral analysis (CSA) is also performed on the activity recorded from each individual channel.  This is displayed as a power display of frequencies from 0 to 30 Hz over time.   The CSA analysis is done and displayed continuously.  This is reviewed for asymmetries in power between homologous areas of the scalp and for presence of changes in power which canbe seen when seizures occur.  Sections of suspected abnormalities on the CSA is then compared with the original EEG recording.     [employer] software was also utilized in the review of this study.  This software suite analyzes the EEG recording in multiple domains.  Coherence and rhythmicity is computed to identify EEG sections which may contain organized seizures.  Each channel undergoes analysis to detect presence of spike and sharp waves which have special and morphological  characteristic of epileptic activity.  The routine EEG recording is converted from spacial into frequency domain.  This is then displayed comparing homologous areas to identify areas of significant asymmetry.  Algorithm to identify non-cortically generated artifact is used to separate eye movement, EMG and other artifact from the EEG.      Recording Times  Start on 3/1/2020, 7:00  Stop on 3/2/2020, 14:41    A total of 7 hours and 41 minutes of EEG was recorded.    EEG FINDINGS  Background activity:   The background rhythm was characterized by delta > theta (2-5 Hz) activity   No posterior dominant rhythm seen.   Symmetry and continuity: the background was continuous and symmetric    Sleep:   Not seen.    Activation procedures:   Not done    Abnormal activity:   Abundant bilateral periodic discharges (brioad sharp waves) of triphasic morphology are seen throughout the study.    EKG:   Irregular rhythm seen.    IMPRESSION:   This is an abnormal C-EEG due to the severe generalized slowing seen, suggestive of severe diffuse or multifocal cerebral dysfunction, likely of metabolic etiology.  No epileptiform activity or electrographic seizures seen.  Irregular heart rate noted on EKG.     CLINICAL CORRELATION IS RECOMMENDED.    Zena Sutherland MD, SILVER(), REYNALDO, JONNA.  Neurology-Epilepsy.  [facility].

## 2020-03-02 NOTE — SIGNIFICANT EVENT
Death Note     Called to bedside by patient's nurse. Family at bedside.   Patient is not responding to verbal or tactile stimuli.No heart or breath sounds on auscultation. No respirations. No palpable pulses.      Time of death: 16:07 pm  Cause of Death: Decompensated liver cirrhosis        -------------------------------------------------------------------------------------------------------------------  Electronically signed by:     Nighat Benitez DO  Pager: [phone]  [email or web address]  Department of Internal Medicine  [facility]

## 2020-03-02 NOTE — NURSING
Bedside EKG showed intermittent episodes of pt asystole with returns to SB. Eli VIGIL called to bedside. Family would like to continue measures until additional family arrives. MD educated family of pt prognosis. Family verbally ok'ed dc of all medications and to withdraw care. Eli VIGIL verbally ordered RN to dc gtts at 1603.     1607 Bedside EKG and telemetry shows asystole. No audible breath sounds, no palpable pulses. MD Benitez at bedside to pronounce TOD.  Evelio and Jolynn notified. Charge nurse Marti notified.

## 2020-03-03 LAB
BACTERIA BLD CULT: NORMAL
BACTERIA BLD CULT: NORMAL

## 2020-03-03 NOTE — PLAN OF CARE
Patient  3/2/20 Time of death: 16:07 pm.  Cause of Death: Decompensated liver cirrhosis per MD.       20 0916   Final Note   Assessment Type Final Discharge Note   Anticipated Discharge Disposition    Hospital Follow Up  Appt(s) scheduled? No   Discharge plans and expectations educations in teach back method with documentation complete? No   Right Care Referral Info   Post Acute Recommendation Other   Referral Type    Facility Name n/a       Garret Belcher RN MSN  Critical Care-   Ext. [phone]

## 2020-03-03 NOTE — DISCHARGE SUMMARY
[facility]  Critical Care Medicine  Discharge Summary      Patient Name: Sushil Mcmahon  MRN: [id number]  Admission Date: 2/27/2020  Hospital Length of Stay: 4 days  Discharge Date and Time:  03/02/2020 6:14 PM  Attending Physician: Heather Hunt MD   Discharging Provider: Nighat Benitez DO  Primary Care Provider: Denis Fulton MD  Reason for Admission: Decompensated Liver Cirrhosis    HPI:   Mr. Mcmahon is a 70yo male with cirrhosis/liver failure due to THOMAS, history of hepatic encephalopathy, variceal bleeding, diabetes, hypothyroidism, anxiety/depression, hypotension, A-fib, recent CABG 2/11/2020 who presents as a transfer from [facility] in [place] for liver transplant evaluation. Per notes, patient was previously worked up with liver transplant but was not a candidate due to multiple comorbidities including multi vessel coronary artery disease which required CABG. He was deemed high risk due to multiple comorbidities, also turned down at [facility] where he presented for a second opinion. He eventually went to [facility] were he got a 4- vessel CABG on 2/11. The procedure was complicated by pneumothorax, SALOMON and decompensated liver failure with (MELD 30). He was stabilized and transferred to [facility] for transplant hepatology evaluation, however he was denied candidacy. Patient was then transferred to [facility] 2/27 for higher level of care and liver transplant evaluation. Critical care consulted for hypoxia, encephalopathy and acute renal failure.    On evaluation, patient was encephalopathic and  appeared in distress 2/2 to pain, saturating 86% in 15L n/c. Per wife, patient has been experiencing worse pain in the past few days. The pain is nonspecifically located in his groin/pelvic region. He was also reported to be anuric since arrival at with [facility]. Labs significant for WBC 12.80, HGB 8.0, PLT 6.8, BUN/Cr 162/5.4, lactic acid of  2.5. Patient was given a dose of dilaudid on the floor, after which he became difficult to arouse, this was subsequently treated with Narcan and patient responded appropriately.     * No surgery found *    Indwelling Lines/Drains at Time of Discharge:   Lines/Drains/Airways     Peripherally Inserted Central Catheter Line            PICC Double Lumen left brachial -- days          Central Venous Catheter Line            Trialysis (Dialysis) Catheter 02/27/20 1820 right internal jugular 3 days          Drain                 Fecal Incontinence  02/29/20 0300 2 days              Hospital Course:   Mr. Mcmahon was admitted to MICU for acute metabolic encephalopathy, hypoxia and acute respiratory failure. Patient was started on HD for for uremia, oliguria and renal failure. CT head was ordered to evaluate for worsening encephalopathy, which was unremarkable. CT abd/pelvis revealed b/l pleural effusions and a cirrhotic liver with prominent lymph nodes. Patient was started on broad spectrum antibiotics to cover for SBP & PNA and septic shock. He was also started on Norepinephrine to maintain MAPs of 65. Patient also started on lactulose enema's for hepatic encephalopathy. Patient with no improvement in mental status despite having bowel movements on the enema. He was started on Precedex for agitation. Patient with worsening liver function 2/29, added on Vasopressin for MAP goal of 85. Patient still with worsening liver function, having paroxysmal atrial fibrillation. Metoprolol 5 mg IV x 3 ordered for HR sustained in 150's, subsequent bradycardia to the 40's. Amiodarone ggt started for rate control. Discussion with wife on goals of care, she stated she will like to give him till Tuesday, 3/3/2020 before making a final decision on withdrawing care. Pt placed on 24hr EEG monitoring. Patient now requiring 3 vasopressors to maintain blood pressure.    Consults (From admission, onward)        Status Ordering Provider      Inpatient consult to Critical Care Medicine  Once     Provider:  (Not yet assigned)    Completed SUSI CROWE     Inpatient consult to Hepatology  Once     Provider:  (Not yet assigned)    Completed SUSI CROWE     Inpatient consult to Nephrology  Once     Provider:  (Not yet assigned)    Completed SUSI CROWE     Inpatient consult to Palliative Care  Once     Provider:  (Not yet assigned)    Completed CARON KENT     Inpatient consult to Registered Dietitian/Nutritionist  Once     Provider:  (Not yet assigned)    Completed SUSI CROWE     Inpatient consult to Urology  Once     Provider:  (Not yet assigned)    Completed AMANDA MANDUJANO     Pharmacy to dose Vancomycin consult  Once     Provider:  (Not yet assigned)    Acknowledged CARON KENT        Significant Labs:  CMP:   Recent Labs   Lab 03/01/20  0345 03/01/20  1249 03/01/20  2147 03/02/20  0345    138 140 138  138   K 4.6 4.7 4.8 4.7  4.7    107 107 105  105   CO2 18* 17* 18* 16*  16*   GLU 82 121* 109 107  107   BUN 6* 5* 4* 4*  4*   CREATININE 0.8 0.7 0.6 0.5  0.5   CALCIUM 8.6* 8.4* 8.7 8.7  8.7   PROT 5.5*  --   --  5.3*   ALBUMIN 3.1* 2.8* 2.8* 2.8*  2.8*   BILITOT 20.4*  --   --  22.9*   ALKPHOS 266*  --   --  276*   *  --   --  970*   *  --   --  329*   ANIONGAP 14 14 15 17*  17*   EGFRNONAA >60.0 >60.0 >60.0 >60.0  >60.0     Coagulation:   Recent Labs   Lab 03/02/20  0345   INR 4.7*     Lactic Acid:   Recent Labs   Lab 03/01/20  1742 03/01/20  2323   LACTATE 8.5* 8.2*       Significant Imaging:  I have reviewed all pertinent imaging results/findings within the past 24 hours.    Pending Diagnostic Studies:     Procedure Component Value Units Date/Time    Lactic acid, plasma [[id number]] Collected:  03/02/20 0500    Order Status:  Sent Lab Status:  No result     Specimen:  Blood     Lactic acid, plasma [[id number]] Collected:  02/27/20 1013    Order Status:  Sent Lab Status:  In process Updated:  02/27/20  1558    Specimen:  Blood         Final Active Diagnoses:    Diagnosis Date Noted POA    PRINCIPAL PROBLEM:  Decompensated cirrhosis [K74.60] 2020 Unknown    Paroxysmal atrial fibrillation with rapid ventricular response [I48.0] 2020 Unknown    Severe malnutrition [E43] 2020 Unknown    Alteration in skin integrity in adult [R23.9] 2020 Yes    Septic shock [A41.9, R65.21] 2020 No    Acute respiratory failure with hypoxia [J96.01] 2020 Yes    SALOMON (acute kidney injury) [N17.9] 2020 Yes    Thrombocytopenia [D69.6] 2020 Yes    Coronary artery disease involving coronary bypass graft of native heart with angina pectoris [I25.709] 2020 Yes    Abdominal pain [R10.9] 2020 Yes    Penile pain [N48.89] 2020 Yes      Problems Resolved During this Admission:     Discharged Condition:     Disposition:       Patient Instructions:   No discharge procedures on file.  Medications:  None (patient  at medical facility)     Nighat Benitez DO  Critical Care Medicine  [facility]

## 2020-03-05 NOTE — PT/OT/SLP DISCHARGE
Occupational Therapy Discharge Summary    Sushil Mcmahon  MRN: [id number]   Principal Problem: Cirrhosis      Patient Discharged from acute Occupational Therapy on 3/5/2020  .  Please refer to prior OT note dated 2020 for functional status.    Assessment:      pt     Objective:     GOALS:   Multidisciplinary Problems     Occupational Therapy Goals        Problem: Occupational Therapy Goal    Goal Priority Disciplines Outcome Interventions   Occupational Therapy Goal     OT, PT/OT Ongoing, Progressing    Description:  Goals to be met by: 7 days 3/6/2020     Patient will increase functional independence with ADLs by performing:    Pt to complete UE dressing with MIN A   Pt to complete g/h skills seated with set-up  Pt to t/f to commode with MIN A   Pt to complete toileting with MOD A   Pt to tolerated OOB to chair x 2-3 hours daily to increase endurance for activity.                     Pt seen for OT eval only then with decline in medical status. Pt .     Reasons for Discontinuation of Therapy Services  pt       Plan:     Patient Discharged to: pt      LOY Wood  3/5/2020

## 2022-08-01 NOTE — ASSESSMENT & PLAN NOTE
Severe thrombocytopenia 39. Likely secondary to cirrhosis, 120 in Jan, 2020  Daily CBC   Hold Heparin 5K Q8H     Undermining Type: Entire Wound

## 2024-08-05 NOTE — PLAN OF CARE
CMICU DAILY GOALS       A: Awake    RASS: Goal - RASS Goal: 0-->alert and calm  Actual - RASS ([place]guero Agitation-Sedation Scale): -2-->light sedation   Restraint necessity: Clinical Justification: Removing medical devices  B: Breath   SBT: NA   C: Coordinate A & B, analgesics/sedatives   Pain: managed    SAT: NA  D: Delirium   CAM-ICU: Overall CAM-ICU: Positive  E: Early Mobility   MOVE Screen: Fail   Activity: Activity Management: activity adjusted per tolerance, activity clustered for rest period  FAS: Feeding/Nutrition   Diet order: Diet/Nutrition Received: tube feeding,   Fluid restriction:    T: Thrombus   DVT prophylaxis: VTE Required Core Measure: Pharmacological prophylaxis initiated/maintained  H: HOB Elevation   Head of Bed (HOB): HOB at 30-45 degrees  U: Ulcer Prophylaxis   GI: yes  G: Glucose control   managed    S: Skin   Bundle compliance: yes   Bathing/Skin Care: bath, chlorhexidine, bath, complete, dressed/undressed, incontinence care, linen changed Date: Partial bath 03-, 5:30  B: Bowel Function   diarrhea   I: Indwelling Catheters   [place] necessity: [REMOVED]      Urethral Catheter 02/27/20 1300-Reason for Continuing Urinary Catheterization: Critically ill in ICU requiring intensive monitoring   CVC necessity: yes   IPAD offered: Not appropriate  D: De-escalation Antibx   No  Plan for the day   Continu   Family/Goals of care/Code Status   Code Status: DNR     No acute events throughout day, VS and assessment per flow sheet, patient progressing towards goals as tolerated, plan of care reviewed with Sushil Mcmahon and family, all concerns addressed, will continue to monitor.     Hakan confirmed dc with Lito Maher , confirmed he will pick pt up today at 10am. Call ended mutually.

## 2024-09-17 NOTE — ASSESSMENT & PLAN NOTE
CABG in Feb, 2020 with post-op complication of pneumothorax and pleural effusion   Bradycardia on the floor  Chest xray with pulmonary edema   EKG with sinus bradycardia  Troponin with flat trend  2D ECHO pending   [FreeTextEntry1] : Sarcoid with clear cut response to Humira with decrease in adenopathy and resolution of skin lesions. Skin lesions sarcoid.  Responsive to Humira Bilateral hilar and mediastinal adenopathy likely related to sarcoidosis.  Radiographically improved. Pulmonary nodules may be related to sarcoid.  Stable.